# Patient Record
Sex: FEMALE | Race: WHITE | NOT HISPANIC OR LATINO | Employment: OTHER | URBAN - NONMETROPOLITAN AREA
[De-identification: names, ages, dates, MRNs, and addresses within clinical notes are randomized per-mention and may not be internally consistent; named-entity substitution may affect disease eponyms.]

---

## 2017-03-31 ENCOUNTER — APPOINTMENT (OUTPATIENT)
Dept: GENERAL RADIOLOGY | Facility: HOSPITAL | Age: 77
End: 2017-03-31

## 2017-03-31 ENCOUNTER — HOSPITAL ENCOUNTER (OUTPATIENT)
Facility: HOSPITAL | Age: 77
Setting detail: OBSERVATION
Discharge: HOME | End: 2017-04-02
Attending: EMERGENCY MEDICINE | Admitting: INTERNAL MEDICINE

## 2017-03-31 ENCOUNTER — APPOINTMENT (OUTPATIENT)
Dept: CT IMAGING | Facility: HOSPITAL | Age: 77
End: 2017-03-31

## 2017-03-31 DIAGNOSIS — N39.0 URINARY TRACT INFECTION WITHOUT HEMATURIA, SITE UNSPECIFIED: Primary | ICD-10-CM

## 2017-03-31 DIAGNOSIS — R41.0 CONFUSION: ICD-10-CM

## 2017-03-31 DIAGNOSIS — Z74.09 IMPAIRED FUNCTIONAL MOBILITY, BALANCE, GAIT, AND ENDURANCE: ICD-10-CM

## 2017-03-31 LAB
ALBUMIN SERPL-MCNC: 4.4 G/DL (ref 3.5–5)
ALBUMIN/GLOB SERPL: 1.2 G/DL (ref 1–2)
ALP SERPL-CCNC: 134 U/L (ref 38–126)
ALT SERPL W P-5'-P-CCNC: 26 U/L (ref 13–69)
ANION GAP SERPL CALCULATED.3IONS-SCNC: 17.8 MMOL/L
AST SERPL-CCNC: 30 U/L (ref 15–46)
BACTERIA UR QL AUTO: ABNORMAL /HPF
BASOPHILS # BLD AUTO: 0.02 10*3/MM3 (ref 0–0.2)
BASOPHILS NFR BLD AUTO: 0.4 % (ref 0–2.5)
BILIRUB SERPL-MCNC: 0.5 MG/DL (ref 0.2–1.3)
BILIRUB UR QL STRIP: ABNORMAL
BUN BLD-MCNC: 19 MG/DL (ref 7–20)
BUN/CREAT SERPL: 27.1 (ref 7.1–23.5)
CALCIUM SPEC-SCNC: 9.4 MG/DL (ref 8.4–10.2)
CHLORIDE SERPL-SCNC: 98 MMOL/L (ref 98–107)
CLARITY UR: ABNORMAL
CO2 SERPL-SCNC: 26 MMOL/L (ref 26–30)
COLOR UR: YELLOW
CREAT BLD-MCNC: 0.7 MG/DL (ref 0.6–1.3)
D-LACTATE SERPL-SCNC: 2 MMOL/L (ref 0.5–2)
DEPRECATED RDW RBC AUTO: 44.6 FL (ref 37–54)
EOSINOPHIL # BLD AUTO: 0 10*3/MM3 (ref 0–0.7)
EOSINOPHIL NFR BLD AUTO: 0 % (ref 0–7)
ERYTHROCYTE [DISTWIDTH] IN BLOOD BY AUTOMATED COUNT: 13.2 % (ref 11.5–14.5)
GFR SERPL CREATININE-BSD FRML MDRD: 81 ML/MIN/1.73
GLOBULIN UR ELPH-MCNC: 3.7 GM/DL
GLUCOSE BLD-MCNC: 110 MG/DL (ref 74–98)
GLUCOSE UR STRIP-MCNC: NEGATIVE MG/DL
HCT VFR BLD AUTO: 46.1 % (ref 37–47)
HGB BLD-MCNC: 15.3 G/DL (ref 12–16)
HGB UR QL STRIP.AUTO: ABNORMAL
HYALINE CASTS UR QL AUTO: ABNORMAL /LPF
IMM GRANULOCYTES # BLD: 0.02 10*3/MM3 (ref 0–0.06)
IMM GRANULOCYTES NFR BLD: 0.4 % (ref 0–0.6)
KETONES UR QL STRIP: ABNORMAL
LEUKOCYTE ESTERASE UR QL STRIP.AUTO: ABNORMAL
LYMPHOCYTES # BLD AUTO: 0.78 10*3/MM3 (ref 0.6–3.4)
LYMPHOCYTES NFR BLD AUTO: 13.8 % (ref 10–50)
MAGNESIUM SERPL-MCNC: 1.7 MG/DL (ref 1.6–2.3)
MCH RBC QN AUTO: 30.7 PG (ref 27–31)
MCHC RBC AUTO-ENTMCNC: 33.2 G/DL (ref 30–37)
MCV RBC AUTO: 92.4 FL (ref 81–99)
MONOCYTES # BLD AUTO: 0.68 10*3/MM3 (ref 0–0.9)
MONOCYTES NFR BLD AUTO: 12 % (ref 0–12)
NEUTROPHILS # BLD AUTO: 4.16 10*3/MM3 (ref 2–6.9)
NEUTROPHILS NFR BLD AUTO: 73.4 % (ref 37–80)
NITRITE UR QL STRIP: NEGATIVE
NRBC BLD MANUAL-RTO: 0 /100 WBC (ref 0–0)
PH UR STRIP.AUTO: 5.5 [PH] (ref 5–8)
PLATELET # BLD AUTO: 199 10*3/MM3 (ref 130–400)
PMV BLD AUTO: 10.5 FL (ref 6–12)
POTASSIUM BLD-SCNC: 3.8 MMOL/L (ref 3.5–5.1)
PROT SERPL-MCNC: 8.1 G/DL (ref 6.3–8.2)
PROT UR QL STRIP: ABNORMAL
RBC # BLD AUTO: 4.99 10*6/MM3 (ref 4.2–5.4)
RBC # UR: ABNORMAL /HPF
REF LAB TEST METHOD: ABNORMAL
SODIUM BLD-SCNC: 138 MMOL/L (ref 137–145)
SP GR UR STRIP: >=1.03 (ref 1–1.03)
SQUAMOUS #/AREA URNS HPF: ABNORMAL /HPF
TROPONIN I SERPL-MCNC: <0.012 NG/ML (ref 0–0.03)
UROBILINOGEN UR QL STRIP: ABNORMAL
WBC NRBC COR # BLD: 5.66 10*3/MM3 (ref 4.8–10.8)
WBC UR QL AUTO: ABNORMAL /HPF

## 2017-03-31 PROCEDURE — 70450 CT HEAD/BRAIN W/O DYE: CPT

## 2017-03-31 PROCEDURE — 81001 URINALYSIS AUTO W/SCOPE: CPT | Performed by: EMERGENCY MEDICINE

## 2017-03-31 PROCEDURE — 71020 HC CHEST PA AND LATERAL: CPT

## 2017-03-31 PROCEDURE — 84484 ASSAY OF TROPONIN QUANT: CPT | Performed by: EMERGENCY MEDICINE

## 2017-03-31 PROCEDURE — 99284 EMERGENCY DEPT VISIT MOD MDM: CPT

## 2017-03-31 PROCEDURE — 85025 COMPLETE CBC W/AUTO DIFF WBC: CPT | Performed by: EMERGENCY MEDICINE

## 2017-03-31 PROCEDURE — 83735 ASSAY OF MAGNESIUM: CPT | Performed by: EMERGENCY MEDICINE

## 2017-03-31 PROCEDURE — 80053 COMPREHEN METABOLIC PANEL: CPT | Performed by: EMERGENCY MEDICINE

## 2017-03-31 PROCEDURE — 87086 URINE CULTURE/COLONY COUNT: CPT | Performed by: EMERGENCY MEDICINE

## 2017-03-31 PROCEDURE — 96365 THER/PROPH/DIAG IV INF INIT: CPT

## 2017-03-31 PROCEDURE — 25010000002 CEFTRIAXONE: Performed by: EMERGENCY MEDICINE

## 2017-03-31 PROCEDURE — 83605 ASSAY OF LACTIC ACID: CPT | Performed by: EMERGENCY MEDICINE

## 2017-03-31 PROCEDURE — 93005 ELECTROCARDIOGRAM TRACING: CPT | Performed by: EMERGENCY MEDICINE

## 2017-03-31 RX ORDER — SODIUM CHLORIDE 0.9 % (FLUSH) 0.9 %
10 SYRINGE (ML) INJECTION AS NEEDED
Status: DISCONTINUED | OUTPATIENT
Start: 2017-03-31 | End: 2017-04-01

## 2017-03-31 RX ADMIN — CEFTRIAXONE 1 G: 1 INJECTION, POWDER, FOR SOLUTION INTRAMUSCULAR; INTRAVENOUS at 23:54

## 2017-04-01 PROBLEM — J44.1 ACUTE EXACERBATION OF CHRONIC OBSTRUCTIVE PULMONARY DISEASE (COPD) (HCC): Status: ACTIVE | Noted: 2017-04-01

## 2017-04-01 PROBLEM — N39.0 URINARY TRACT INFECTION WITHOUT HEMATURIA: Status: ACTIVE | Noted: 2017-04-01

## 2017-04-01 PROBLEM — G93.41 ACUTE METABOLIC ENCEPHALOPATHY: Status: ACTIVE | Noted: 2017-04-01

## 2017-04-01 PROBLEM — E44.0 MODERATE MALNUTRITION (HCC): Status: ACTIVE | Noted: 2017-04-01

## 2017-04-01 LAB
ANION GAP SERPL CALCULATED.3IONS-SCNC: 15.6 MMOL/L
BASOPHILS # BLD AUTO: 0.02 10*3/MM3 (ref 0–0.2)
BASOPHILS NFR BLD AUTO: 0.4 % (ref 0–2.5)
BUN BLD-MCNC: 19 MG/DL (ref 7–20)
BUN/CREAT SERPL: 31.7 (ref 7.1–23.5)
CALCIUM SPEC-SCNC: 8.7 MG/DL (ref 8.4–10.2)
CHLORIDE SERPL-SCNC: 102 MMOL/L (ref 98–107)
CO2 SERPL-SCNC: 24 MMOL/L (ref 26–30)
CREAT BLD-MCNC: 0.6 MG/DL (ref 0.6–1.3)
DEPRECATED RDW RBC AUTO: 45.1 FL (ref 37–54)
EOSINOPHIL # BLD AUTO: 0 10*3/MM3 (ref 0–0.7)
EOSINOPHIL NFR BLD AUTO: 0 % (ref 0–7)
ERYTHROCYTE [DISTWIDTH] IN BLOOD BY AUTOMATED COUNT: 13.2 % (ref 11.5–14.5)
FLUAV AG NPH QL: NEGATIVE
FLUBV AG NPH QL IA: NEGATIVE
GFR SERPL CREATININE-BSD FRML MDRD: 97 ML/MIN/1.73
GLUCOSE BLD-MCNC: 90 MG/DL (ref 74–98)
HCT VFR BLD AUTO: 41.8 % (ref 37–47)
HGB BLD-MCNC: 13.8 G/DL (ref 12–16)
HOLD SPECIMEN: NORMAL
HOLD SPECIMEN: NORMAL
IMM GRANULOCYTES # BLD: 0.01 10*3/MM3 (ref 0–0.06)
IMM GRANULOCYTES NFR BLD: 0.2 % (ref 0–0.6)
LYMPHOCYTES # BLD AUTO: 0.67 10*3/MM3 (ref 0.6–3.4)
LYMPHOCYTES NFR BLD AUTO: 13.7 % (ref 10–50)
MCH RBC QN AUTO: 30.5 PG (ref 27–31)
MCHC RBC AUTO-ENTMCNC: 33 G/DL (ref 30–37)
MCV RBC AUTO: 92.5 FL (ref 81–99)
MONOCYTES # BLD AUTO: 0.52 10*3/MM3 (ref 0–0.9)
MONOCYTES NFR BLD AUTO: 10.6 % (ref 0–12)
NEUTROPHILS # BLD AUTO: 3.68 10*3/MM3 (ref 2–6.9)
NEUTROPHILS NFR BLD AUTO: 75.1 % (ref 37–80)
NRBC BLD MANUAL-RTO: 0 /100 WBC (ref 0–0)
PLATELET # BLD AUTO: 190 10*3/MM3 (ref 130–400)
PMV BLD AUTO: 10.8 FL (ref 6–12)
POTASSIUM BLD-SCNC: 3.6 MMOL/L (ref 3.5–5.1)
RBC # BLD AUTO: 4.52 10*6/MM3 (ref 4.2–5.4)
SODIUM BLD-SCNC: 138 MMOL/L (ref 137–145)
WBC NRBC COR # BLD: 4.9 10*3/MM3 (ref 4.8–10.8)
WHOLE BLOOD HOLD SPECIMEN: NORMAL
WHOLE BLOOD HOLD SPECIMEN: NORMAL

## 2017-04-01 PROCEDURE — 85025 COMPLETE CBC W/AUTO DIFF WBC: CPT | Performed by: INTERNAL MEDICINE

## 2017-04-01 PROCEDURE — 99220 PR INITIAL OBSERVATION CARE/DAY 70 MINUTES: CPT | Performed by: INTERNAL MEDICINE

## 2017-04-01 PROCEDURE — G0378 HOSPITAL OBSERVATION PER HR: HCPCS

## 2017-04-01 PROCEDURE — 25010000002 AZITHROMYCIN: Performed by: INTERNAL MEDICINE

## 2017-04-01 PROCEDURE — 94799 UNLISTED PULMONARY SVC/PX: CPT

## 2017-04-01 PROCEDURE — 96372 THER/PROPH/DIAG INJ SC/IM: CPT

## 2017-04-01 PROCEDURE — 25010000002 ENOXAPARIN PER 10 MG: Performed by: INTERNAL MEDICINE

## 2017-04-01 PROCEDURE — 96367 TX/PROPH/DG ADDL SEQ IV INF: CPT

## 2017-04-01 PROCEDURE — 80048 BASIC METABOLIC PNL TOTAL CA: CPT | Performed by: INTERNAL MEDICINE

## 2017-04-01 PROCEDURE — 87804 INFLUENZA ASSAY W/OPTIC: CPT | Performed by: EMERGENCY MEDICINE

## 2017-04-01 PROCEDURE — 94640 AIRWAY INHALATION TREATMENT: CPT

## 2017-04-01 RX ORDER — TRAMADOL HYDROCHLORIDE 50 MG/1
50 TABLET ORAL EVERY 6 HOURS PRN
COMMUNITY
End: 2019-01-09

## 2017-04-01 RX ORDER — ACETAMINOPHEN 325 MG/1
650 TABLET ORAL EVERY 4 HOURS PRN
Status: DISCONTINUED | OUTPATIENT
Start: 2017-04-01 | End: 2017-04-02 | Stop reason: HOSPADM

## 2017-04-01 RX ORDER — IPRATROPIUM BROMIDE AND ALBUTEROL SULFATE 2.5; .5 MG/3ML; MG/3ML
3 SOLUTION RESPIRATORY (INHALATION)
Status: DISCONTINUED | OUTPATIENT
Start: 2017-04-01 | End: 2017-04-02 | Stop reason: HOSPADM

## 2017-04-01 RX ORDER — GABAPENTIN 300 MG/1
300 CAPSULE ORAL
COMMUNITY
End: 2018-12-20 | Stop reason: HOSPADM

## 2017-04-01 RX ORDER — PROPRANOLOL HYDROCHLORIDE 40 MG/1
20 TABLET ORAL 3 TIMES DAILY
Status: ON HOLD | COMMUNITY
End: 2019-02-08

## 2017-04-01 RX ORDER — ONDANSETRON 4 MG/1
4 TABLET, FILM COATED ORAL EVERY 6 HOURS PRN
Status: DISCONTINUED | OUTPATIENT
Start: 2017-04-01 | End: 2017-04-02 | Stop reason: HOSPADM

## 2017-04-01 RX ORDER — IPRATROPIUM BROMIDE AND ALBUTEROL SULFATE 2.5; .5 MG/3ML; MG/3ML
3 SOLUTION RESPIRATORY (INHALATION) EVERY 4 HOURS PRN
Status: DISCONTINUED | OUTPATIENT
Start: 2017-04-01 | End: 2017-04-02 | Stop reason: HOSPADM

## 2017-04-01 RX ORDER — GABAPENTIN 300 MG/1
300 CAPSULE ORAL EVERY 12 HOURS SCHEDULED
Status: DISCONTINUED | OUTPATIENT
Start: 2017-04-01 | End: 2017-04-02 | Stop reason: HOSPADM

## 2017-04-01 RX ORDER — MULTIPLE VITAMINS W/ MINERALS TAB 9MG-400MCG
1 TAB ORAL DAILY
Status: DISCONTINUED | OUTPATIENT
Start: 2017-04-02 | End: 2017-04-02 | Stop reason: HOSPADM

## 2017-04-01 RX ORDER — PAROXETINE HYDROCHLORIDE 20 MG/1
10 TABLET, FILM COATED ORAL EVERY MORNING
Status: DISCONTINUED | OUTPATIENT
Start: 2017-04-02 | End: 2017-04-02 | Stop reason: HOSPADM

## 2017-04-01 RX ORDER — PAROXETINE HYDROCHLORIDE 20 MG/1
20 TABLET, FILM COATED ORAL EVERY MORNING
COMMUNITY

## 2017-04-01 RX ORDER — TRAMADOL HYDROCHLORIDE 50 MG/1
50 TABLET ORAL EVERY 6 HOURS PRN
Status: DISCONTINUED | OUTPATIENT
Start: 2017-04-01 | End: 2017-04-02 | Stop reason: HOSPADM

## 2017-04-01 RX ORDER — ONDANSETRON 2 MG/ML
4 INJECTION INTRAMUSCULAR; INTRAVENOUS EVERY 6 HOURS PRN
Status: DISCONTINUED | OUTPATIENT
Start: 2017-04-01 | End: 2017-04-02 | Stop reason: HOSPADM

## 2017-04-01 RX ORDER — BUDESONIDE 0.5 MG/2ML
0.5 INHALANT ORAL
Status: DISCONTINUED | OUTPATIENT
Start: 2017-04-01 | End: 2017-04-02 | Stop reason: HOSPADM

## 2017-04-01 RX ORDER — SODIUM CHLORIDE 0.9 % (FLUSH) 0.9 %
1-10 SYRINGE (ML) INJECTION AS NEEDED
Status: DISCONTINUED | OUTPATIENT
Start: 2017-04-01 | End: 2017-04-02 | Stop reason: HOSPADM

## 2017-04-01 RX ORDER — PROPRANOLOL HYDROCHLORIDE 10 MG/1
40 TABLET ORAL 3 TIMES DAILY
Status: DISCONTINUED | OUTPATIENT
Start: 2017-04-01 | End: 2017-04-02 | Stop reason: HOSPADM

## 2017-04-01 RX ORDER — GUAIFENESIN/DEXTROMETHORPHAN 100-10MG/5
5 SYRUP ORAL EVERY 4 HOURS PRN
Status: DISCONTINUED | OUTPATIENT
Start: 2017-04-01 | End: 2017-04-02 | Stop reason: HOSPADM

## 2017-04-01 RX ORDER — AZITHROMYCIN 250 MG/1
250 TABLET, FILM COATED ORAL
Status: DISCONTINUED | OUTPATIENT
Start: 2017-04-02 | End: 2017-04-02 | Stop reason: HOSPADM

## 2017-04-01 RX ORDER — ONDANSETRON 4 MG/1
4 TABLET, ORALLY DISINTEGRATING ORAL EVERY 6 HOURS PRN
Status: DISCONTINUED | OUTPATIENT
Start: 2017-04-01 | End: 2017-04-02 | Stop reason: HOSPADM

## 2017-04-01 RX ORDER — NICOTINE 21 MG/24HR
1 PATCH, TRANSDERMAL 24 HOURS TRANSDERMAL EVERY 24 HOURS
Status: DISCONTINUED | OUTPATIENT
Start: 2017-04-01 | End: 2017-04-02 | Stop reason: HOSPADM

## 2017-04-01 RX ORDER — SODIUM CHLORIDE 9 MG/ML
100 INJECTION, SOLUTION INTRAVENOUS CONTINUOUS
Status: DISCONTINUED | OUTPATIENT
Start: 2017-04-01 | End: 2017-04-02 | Stop reason: HOSPADM

## 2017-04-01 RX ADMIN — TRAMADOL HYDROCHLORIDE 50 MG: 50 TABLET, FILM COATED ORAL at 18:08

## 2017-04-01 RX ADMIN — BUDESONIDE 0.5 MG: 0.5 INHALANT RESPIRATORY (INHALATION) at 19:21

## 2017-04-01 RX ADMIN — IPRATROPIUM BROMIDE AND ALBUTEROL SULFATE 3 ML: .5; 3 SOLUTION RESPIRATORY (INHALATION) at 13:48

## 2017-04-01 RX ADMIN — ENOXAPARIN SODIUM 40 MG: 40 INJECTION SUBCUTANEOUS at 09:07

## 2017-04-01 RX ADMIN — SODIUM CHLORIDE 100 ML/HR: 9 INJECTION, SOLUTION INTRAVENOUS at 02:22

## 2017-04-01 RX ADMIN — AZITHROMYCIN 500 MG: 500 INJECTION, POWDER, LYOPHILIZED, FOR SOLUTION INTRAVENOUS at 02:22

## 2017-04-01 RX ADMIN — NICOTINE 1 PATCH: 14 PATCH TRANSDERMAL at 02:22

## 2017-04-01 RX ADMIN — IPRATROPIUM BROMIDE AND ALBUTEROL SULFATE 3 ML: .5; 3 SOLUTION RESPIRATORY (INHALATION) at 06:51

## 2017-04-01 RX ADMIN — BUDESONIDE 0.5 MG: 0.5 INHALANT RESPIRATORY (INHALATION) at 06:53

## 2017-04-01 RX ADMIN — GABAPENTIN 300 MG: 300 CAPSULE ORAL at 20:04

## 2017-04-01 RX ADMIN — PROPRANOLOL HYDROCHLORIDE 40 MG: 10 TABLET ORAL at 20:04

## 2017-04-01 RX ADMIN — IPRATROPIUM BROMIDE AND ALBUTEROL SULFATE 3 ML: .5; 3 SOLUTION RESPIRATORY (INHALATION) at 19:21

## 2017-04-01 RX ADMIN — SODIUM CHLORIDE 100 ML/HR: 9 INJECTION, SOLUTION INTRAVENOUS at 20:05

## 2017-04-01 NOTE — PROGRESS NOTES
"Hospitalist Progress Note.    LOS: 0 days    Patient Care Team:  Gregorio Jackson MD as PCP - General    Chief Complaint:    Chief Complaint   Patient presents with   • Cough   • Altered Mental Status       Subjective   Patient seen and examined this morning.  Events from last night noted.  Patient denies having any fevers chills.  No nausea or vomiting no abdominal pain.  Denies any chest pain shortness of breath cough or sputum production.  There is no significant edema.   Patient also denies having new onset weakness of numbness of either extremity.      Review of Systems:    The pertinent  ROS was done and it is noted above, rest  was negative.    Objective     Vital Signs  /94 (BP Location: Right arm, Patient Position: Lying)  Pulse 90  Temp 99.4 °F (37.4 °C) (Oral)   Resp 18  Ht 64\" (162.6 cm)  Wt 101 lb 6.6 oz (46 kg)  SpO2 93%  BMI 17.41 kg/m2      I/O this shift:  In: 240 [P.O.:240]  Out: -     Intake/Output Summary (Last 24 hours) at 04/01/17 1154  Last data filed at 04/01/17 0846   Gross per 24 hour   Intake              794 ml   Output               50 ml   Net              744 ml       Physical Exam:    General Appearance: alert, oriented x 3, no acute distress,   HEENT: pupils round and reactive to light, oral mucosa dry, extra occular movements intact.  Neck: supple, no JVD, trachea midline  Lungs: Clear to Auscultation, unlabored breathing effort  Heart: RRR, normal S1 and S2, no S3, no rub  Abdomen: soft, non-tender, no palpable bladder, present bowel sounds to auscultation  Extremities: no edema, cyanosis or clubbing.   Neuro: normal speech and mental status, grossly non focal.     Results Review:      Results from last 7 days  Lab Units 04/01/17  0515 03/31/17  2217   SODIUM mmol/L 138 138   POTASSIUM mmol/L 3.6 3.8   CHLORIDE mmol/L 102 98   TOTAL CO2 mmol/L 24.0* 26.0   BUN mg/dL 19 19   CREATININE mg/dL 0.60 0.70   CALCIUM mg/dL 8.7 9.4   BILIRUBIN mg/dL  --  0.5   ALK PHOS U/L "  --  134*   ALT (SGPT) U/L  --  26   AST (SGOT) U/L  --  30   GLUCOSE mg/dL 90 110*       Estimated Creatinine Clearance: 42.8 mL/min (by C-G formula based on Cr of 0.6).      Results from last 7 days  Lab Units 03/31/17  2217   MAGNESIUM mg/dL 1.7               Results from last 7 days  Lab Units 04/01/17  0515 03/31/17  2217   WBC 10*3/mm3 4.90 5.66   HEMOGLOBIN g/dL 13.8 15.3   PLATELETS 10*3/mm3 190 199               Imaging Results (last 24 hours)     Procedure Component Value Units Date/Time    XR Chest 2 View [[id number]] Collected:  03/31/17 2323     Updated:  03/31/17 2325    Narrative:       FINAL REPORT    TECHNIQUE:  AP and lateral views of the chest were obtained.    CLINICAL HISTORY:  SOA.BACK PAIN.    FINDINGS:  There is elevation of the left hemidiaphragm. The lungs are clear. There is no pleural disease. A skin fold projecting over the left lower lung field should not be confused with a pneumothorax. There are old right lower posterior rib fractures. There is   no acute osseous abnormality.      Impression:       No acute disease.    Authenticated by Wilbert Boles M.D. on 03/31/2017 11:23:30 PM    CT Head Without Contrast [[id number]] Collected:  03/31/17 2337     Updated:  03/31/17 2339    Narrative:       FINAL REPORT    TECHNIQUE:  Routine axial images through the head were obtained without contrast.    CLINICAL HISTORY:  AMS    FINDINGS:  The ventricles are dilated, proportionate to the degree of generalized atrophy.  Periventricular and deep white matter low-attenuation areas are consistent with chronic small vessel ischemia.  There is no mass or shift in midline structures.  There is no   intracranial hemorrhage.  There is no significant sinus or osseous abnormality.      Impression:       No acute intracranial abnormality.    Authenticated by Wilbert Boles M.D. on 03/31/2017 11:37:23 PM          [START ON 4/2/2017] azithromycin 250 mg Oral Q24H   budesonide 0.5 mg Nebulization BID - RT   enoxaparin 40 mg  Subcutaneous Daily   ipratropium-albuterol 3 mL Nebulization Q6H - RT   nicotine 1 patch Transdermal Q24H       sodium chloride 100 mL/hr Last Rate: 100 mL/hr (04/01/17 0222)       Medication Review:   Current Facility-Administered Medications   Medication Dose Route Frequency Provider Last Rate Last Dose   • acetaminophen (TYLENOL) tablet 650 mg  650 mg Oral Q4H PRN Sandoval Falcon MD       • [START ON 4/2/2017] azithromycin (ZITHROMAX) tablet 250 mg  250 mg Oral Q24H Sandoval Falcon MD       • budesonide (PULMICORT) nebulizer solution 0.5 mg  0.5 mg Nebulization BID - RT Sandoval Falcon MD   0.5 mg at 04/01/17 0653   • enoxaparin (LOVENOX) syringe 40 mg  40 mg Subcutaneous Daily Sandoval Falcon MD   40 mg at 04/01/17 0907   • guaifenesin-dextromethorphan (ROBITUSSIN DM) 100-10 MG/5ML syrup 5 mL  5 mL Oral Q4H PRN Sandoval Falcon MD       • ipratropium-albuterol (DUO-NEB) nebulizer solution 3 mL  3 mL Nebulization Q4H PRN Sandoval Falcon MD       • ipratropium-albuterol (DUO-NEB) nebulizer solution 3 mL  3 mL Nebulization Q6H - RT Sandoval Falcon MD   3 mL at 04/01/17 0651   • nicotine (NICODERM CQ) 14 MG/24HR patch 1 patch  1 patch Transdermal Q24H Sandoval Falcon MD   1 patch at 04/01/17 0222   • ondansetron (ZOFRAN) tablet 4 mg  4 mg Oral Q6H PRN Sandoval Falcon MD        Or   • ondansetron ODT (ZOFRAN-ODT) disintegrating tablet 4 mg  4 mg Oral Q6H PRN Sandoval Falcon MD        Or   • ondansetron (ZOFRAN) injection 4 mg  4 mg Intravenous Q6H PRN Sandoval Falcon MD       • sodium chloride 0.9 % flush 1-10 mL  1-10 mL Intravenous PRN Sandoval Falcon MD       • sodium chloride 0.9 % infusion  100 mL/hr Intravenous Continuous Sandoval Falcon  mL/hr at 04/01/17 0222 100 mL/hr at 04/01/17 0222       Assessment/Plan       Principal Problem:    Urinary tract infection without hematuria  Active Problems:    Acute exacerbation of chronic obstructive pulmonary disease (COPD)    Moderate malnutrition    Acute metabolic encephalopathy    After I finish the  examination of the patient she told me that Dr. Jackson was and this morning.  I was not aware that he was working this weekend.  I did tell the family as well as the patient that he will take over the care from here on.  Patient was admitted earlier this morning and appears to be improving with the current treatment plan.    Details were discussed with the patient as well as family in the room.   I also discussed the details with the nursing staff.    Rest as ordered.    Cameron Currie MD  04/01/17  11:54 AM    Please note that portions of this note may have been completed with a voice recognition program. Efforts were made to edit the dictations, but occasionally words are mistranscribed.

## 2017-04-01 NOTE — PLAN OF CARE
Problem: Patient Care Overview (Adult)  Goal: Plan of Care Review  Outcome: Ongoing (interventions implemented as appropriate)    04/01/17 0256   Coping/Psychosocial Response Interventions   Plan Of Care Reviewed With patient   Patient Care Overview   Progress progress towards functional goals is fair   Outcome Evaluation   Outcome Summary/Follow up Plan NEW ADMISSION.       Goal: Adult Individualization and Mutuality  Outcome: Ongoing (interventions implemented as appropriate)  Goal: Discharge Needs Assessment  Outcome: Ongoing (interventions implemented as appropriate)    Problem: Fall Risk (Adult)  Goal: Identify Related Risk Factors and Signs and Symptoms  Outcome: Ongoing (interventions implemented as appropriate)  Goal: Absence of Falls  Outcome: Ongoing (interventions implemented as appropriate)    Problem: Urine Elimination, Impaired (Adult)  Goal: Identify Related Risk Factors and Signs and Symptoms  Outcome: Ongoing (interventions implemented as appropriate)  Goal: Effective Urinary Elimination  Outcome: Ongoing (interventions implemented as appropriate)  Goal: Effective Containment of Urine  Outcome: Ongoing (interventions implemented as appropriate)  Goal: Reduced Incontinence Episodes  Outcome: Ongoing (interventions implemented as appropriate)

## 2017-04-01 NOTE — CONSULTS
Malnutrition Severity Assessment    Patient Name:  Maria Dolores Campa  YOB: 1940  MRN: [id number]  Admit Date:  3/31/2017    Patient meets criteria for : Moderate malnutrition    Comments:  Rec. #1: Consider adding High Calorie, High Protein to current diet order. Rec. #2: Consider speech evaluation secondary to pt. With MS diagnosis. Rec. #3: Consider adding appetite stimulant. Rec. #4: Consider adding MVI with minerals daily. Rec. #5: Consider checking pt.'s Vitamin D3 and Vitamin B12 levels and supplementing as needed along with adding Omega 3 Fatty acids secondary to MS diagnosis. RD added Cranberry Juice to the breakfast tray. Pt to also receive Ensure BID and Magic Cup BID. RD to follow pt. Thank you for the consult.       Malnutrition Type: Chronic Illness Malnutrition     Malnutrition Type (last 8 hours)      Malnutrition Severity Assessment       04/01/17 1008    Physical Signs of Malnutrition (Chronic)    Muscle Wasting Mild    Fat Loss Mild    Fluid Accumulation None    Secondary Physical Signs None      04/01/17 1008    Malnutrition Severity Assessment    Malnutrition Type Chronic Illness Malnutrition          Physical Signs of Malnutrition         Most Recent Value    Muscle Wasting  Mild    Fat Loss  Mild    Fluid Accumulation  None    Secondary Physical Signs  None      Weight Status         Most Recent Value    BMI  Mild (<19)    %IBW  Mild (<90%)              Electronically signed by:  Keesha Vu RD  04/01/17 10:14 AM

## 2017-04-01 NOTE — CONSULTS
Malnutrition Severity Assessment    Patient Name:  Maria Dolores Campa  YOB: 1940  MRN: [id number]  Admit Date:  3/31/2017    Patient meets criteria for : Moderate malnutrition    Comments:   Rec. #1: Consider adding High Calorie, High Protein to current diet order. Rec. #2: Consider speech evaluation secondary to pt. With MS diagnosis. Rec. #3: Consider adding appetite stimulant. Rec. #4: Consider adding MVI with minerals daily. Rec. #5: Consider checking pt.'s Vitamin D3 and Vitamin B12 levels and supplementing as needed along with adding Omega 3 Fatty acids secondary to MS diagnosis. RD added Cranberry Juice to the breakfast tray. Pt to also receive Ensure BID and Magic Cup BID. RD to follow pt. Thank you for the consult.           Malnutrition Type: Chronic Illness Malnutrition     Malnutrition Type (last 8 hours)      Malnutrition Severity Assessment       04/01/17 1008    Physical Signs of Malnutrition (Chronic)    Muscle Wasting Mild    Fat Loss Mild    Fluid Accumulation None    Secondary Physical Signs None      04/01/17 1008    Malnutrition Severity Assessment    Malnutrition Type Chronic Illness Malnutrition          Physical Signs of Malnutrition         Most Recent Value    Muscle Wasting  Mild    Fat Loss  Mild    Fluid Accumulation  None    Secondary Physical Signs  None      Weight Status         Most Recent Value    BMI  Mild (<19)    %IBW  Mild (<90%)              Electronically signed by:  Keesha Vu RD  04/01/17 10:17 AM

## 2017-04-01 NOTE — ED PROVIDER NOTES
"Subjective   HPI Comments: 77-year-old female presenting with altered mental status and cough.  She is brought in by her family who provide history.  The last 2 days patient has had a \"severe\" cough.  Cough has been largely nonproductive.  Today she has had slowly progressive confusion, has not been able to converse with her  as usual.  They do note she's had several episodes of UTIs that have presented similarly.  They deny any severe headaches, chest pain, shortness of breath, vomiting, diarrhea.  No sick contacts that they know of.      Review of Systems   Unable to perform ROS: Mental status change       Past Medical History:   Diagnosis Date   • COPD (chronic obstructive pulmonary disease)    • MS (multiple sclerosis)        No Known Allergies    Past Surgical History:   Procedure Laterality Date   • CHOLECYSTECTOMY     • EYE SURGERY      Corneal implant       History reviewed. No pertinent family history.    Social History     Social History   • Marital status:      Spouse name: N/A   • Number of children: N/A   • Years of education: N/A     Social History Main Topics   • Smoking status: Current Every Day Smoker     Packs/day: 0.50     Types: Cigarettes   • Smokeless tobacco: None   • Alcohol use No   • Drug use: No   • Sexual activity: Defer     Other Topics Concern   • None     Social History Narrative   • None           Objective   Physical Exam   Constitutional: She appears well-developed and well-nourished. No distress.   HENT:   Head: Normocephalic and atraumatic.   Right Ear: External ear normal.   Left Ear: External ear normal.   Nose: Nose normal.   Mouth/Throat: Oropharynx is clear and moist.   Eyes: Conjunctivae and EOM are normal. Pupils are equal, round, and reactive to light.   Neck: Normal range of motion. Neck supple.   No meningismus   Cardiovascular: Normal rate, regular rhythm, normal heart sounds and intact distal pulses.    Pulmonary/Chest: Effort normal and breath sounds " normal. No respiratory distress.   Crackles at the right base   Abdominal: Soft. Bowel sounds are normal. She exhibits no distension. There is no tenderness. There is no rebound and no guarding.   Musculoskeletal: Normal range of motion. She exhibits no edema, tenderness or deformity.   Neurological: She is alert.   Moves all extremities   Skin: Skin is warm and dry. No rash noted.   Psychiatric: She has a normal mood and affect. Her behavior is normal.   Nursing note and vitals reviewed.      Procedures         ED Course  ED Course                  MDM  Number of Diagnoses or Management Options  Confusion:   Urinary tract infection without hematuria, site unspecified:   Diagnosis management comments: 77-year-old female with altered mental status and cough.  Frail elderly female in no distress with normal vital signs and exam as above.  She does have some crackles at the right base.  Otherwise an essentially nonfocal exam.  We'll check labs, EKG, chest x-ray, head CT.  Disposition pending workup though anticipate admission.    EKG: Sinus rhythm, short AR, normal axis, T-wave inversion in V4 and V5, otherwise diffuse T wave flattening, similar to previous    Lab work is unremarkable.  CT scan is negative.  Chest x-ray read as clear by radiology.  UA with white blood cells, leuk esterase, bacteria.  I have given her a dose of Rocephin.  Discussed with hospitalist for admission.      Final diagnoses:   Urinary tract infection without hematuria, site unspecified   Confusion            Solis Jack MD  04/01/17 0018

## 2017-04-01 NOTE — H&P
[facility]   HISTORY AND PHYSICAL      Name:  Maria Dolores Campa   Age:  77 y.o.  Sex:  female  :  1940  MRN:  [id number]   Visit Number:  [id number]  Admission Date:  3/31/2017  Date Of Service:  17  Primary Care Physician:  Gregorio Jackson MD    Chief Complaint:     Increasing shortness of breath and confusion.    History Of Presenting Illness:      This is a 77-year-old female with history of COPD not using home oxygen, who continues to smoke, was brought to the emergency room by her  and daughter with complaints of worsening shortness of breath, confusion and generalized weakness in the last several days.  Patient was seen and examined in the emergency room at 00:30 today.  The history is obtained from the  and daughter were at the bedside.  The patient started having worsening of her cough with white sputum about 1 week ago.  This has progressively worsened associated with some confusion and generalized weakness.  There is no history of fever, nausea or vomiting.  No history of abdominal pain.  Patient was brought into the emergency room by the family.    Patient was evaluated in the emergency room.  Her temperature was 99.3.  Pulse 82, blood pressure 114/96 and her pulse oxygen saturation was 92% on room air.  Blood work done in the emergency room was fairly stable.  Urinalysis showed urinary tract infection.  Chest x-ray was negative for any infiltrates.  She was placed on oxygen and was given Rocephin for urinary tract infection and is currently being admitted to the medical floor.  She complains of shortness of breath and back pain at this time.  Her last admission to this facility was in 2016 for COPD exacerbation.    Patient use with her  and uses a walker to ambulate.  She smokes half pack of cigarettes per day.    Review Of Systems:     General ROS: Patient denies any fevers, chills or loss of consciousness. Complains of  generalized weakness.  Psychological ROS: No history of any hallucinations and delusions.  Ophthalmic ROS: No history of any diplopia or transient loss of vision.  ENT ROS: No history of sore throat, nasal congestion or ear pain.   Allergy and Immunology ROS: No history of rash or itching.  Hematological and Lymphatic ROS: No history of neck swelling or easy bleeding.  Endocrine ROS: No history of any recent unintentional weight gain or loss.  Respiratory ROS: Complaints or shortness of breath and cough.  No pleuritic chest pain.  Cardiovascular ROS: No history of chest pain or palpitations.  Gastrointestinal ROS: No history of nausea and vomiting. Denies any abdominal pain. No diarrhea.  Genito-Urinary ROS: No history of dysuria or hematuria.  Musculoskeletal ROS: No muscle pain. No calf pain. Complains of chronic back pain.  Neurological ROS: No history of any focal weakness. No loss of consciousness. Denies any numbness. Denies neck pain.  There is history of confusion.  Dermatological ROS: No history of any redness or pruritis.     Past Medical History:    Past Medical History:   Diagnosis Date   • COPD (chronic obstructive pulmonary disease)    • MS (multiple sclerosis)        Past Surgical history:    Past Surgical History:   Procedure Laterality Date   • CHOLECYSTECTOMY     • EYE SURGERY      Corneal implant       Social History:    Social History     Social History   • Marital status:      Spouse name: N/A   • Number of children: N/A   • Years of education: N/A     Occupational History   • Not on file.     Social History Main Topics   • Smoking status: Current Every Day Smoker     Packs/day: 0.50     Types: Cigarettes   • Smokeless tobacco: Not on file   • Alcohol use No   • Drug use: No   • Sexual activity: Defer     Other Topics Concern   • Not on file     Social History Narrative   • No narrative on file       Family History:    History reviewed. No pertinent family history.    Allergies:       Review of patient's allergies indicates no known allergies.    Home Medications:    Prior to Admission Medications     None             ED Medications:    Medications   sodium chloride 0.9 % flush 10 mL (not administered)   cefTRIAXone (ROCEPHIN) 1 g/50 mL 0.9% NS VTB (mbp) (0 g Intravenous Stopped 4/1/17 0030)       Vital Signs:    Temp:  [99.3 °F (37.4 °C)] 99.3 °F (37.4 °C)  Heart Rate:  [82-95] 95  Resp:  [20] 20  BP: (110-114)/(96-98) 110/98    Last 3 weights    03/31/17  2211   Weight: 105 lb (47.6 kg)       Body mass index is 18.02 kg/(m^2).    Physical Exam:    General Appearance:  Alert and cooperative, in mild distress.   Head:  Atraumatic and normocephalic, without obvious abnormality.   Eyes:          PERRLA, conjunctivae and sclerae normal, no Icterus. No pallor. Extra-occular movements are within normal limits.   Ears:  Ears appear intact with no abnormalities noted.   Throat: No oral lesions, no thrush, oral mucosa moist.   Neck: Supple, trachea midline, no thyromegaly, no carotid bruit.   Back:   No kyphoscoliosis present. No tenderness to palpation,   range of motion normal.   Lungs:   Chest shape is normal. Breath sounds heard bilaterally equally but air entry is decreased.  No crackles or wheezing. No Pleural rub or bronchial breathing.   Heart:  Normal S1 and S2, no murmur, no gallop, no rub. No JVD.   Abdomen:   Normal bowel sounds, no masses, no organomegaly. Soft     non-tender, non-distended, no guarding, no rebound tenderness.   Extremities: Moves all extremities well, no edema, no cyanosis, no clubbing.   Pulses: Pulses palpable and equal bilaterally.   Skin: No bleeding, bruising or rash.   Neurologic: Alert and oriented x 3. Moves all four limbs equally. No tremors. No facial asymetry.     Laboratory data:    I have reviewed the labs done in the emergency room.      Results from last 7 days  Lab Units 03/31/17  2217   SODIUM mmol/L 138   POTASSIUM mmol/L 3.8   CHLORIDE mmol/L 98    TOTAL CO2 mmol/L 26.0   BUN mg/dL 19   CREATININE mg/dL 0.70   CALCIUM mg/dL 9.4   BILIRUBIN mg/dL 0.5   ALK PHOS U/L 134*   ALT (SGPT) U/L 26   AST (SGOT) U/L 30   GLUCOSE mg/dL 110*       Results from last 7 days  Lab Units 03/31/17  2217   WBC 10*3/mm3 5.66   HEMOGLOBIN g/dL 15.3   HEMATOCRIT % 46.1   PLATELETS 10*3/mm3 199           Results from last 7 days  Lab Units 03/31/17  2217   TROPONIN I ng/mL <0.012                       Results from last 7 days  Lab Units 03/31/17  2308   COLOR UA  Yellow   GLUCOSE UA  Negative   KETONES UA  80 mg/dL (3+)*   LEUKOCYTES UA  Trace*   PH, URINE  5.5   BILIRUBIN UA  Small (1+)*   UROBILINOGEN UA  0.2 E.U./dL     Pain Management Panel     There is no flowsheet data to display.              EKG:      EKG done in the emergency room was reviewed by me.  It shows normal sinus rhythm at 88 bpm.  Normal axis. P pulmonale was noted.  Inverted T waves noted in V4 to V6.    Radiology:    Imaging Results (last 72 hours)     Procedure Component Value Units Date/Time    XR Chest 2 View [[id number]] Collected:  03/31/17 2323     Updated:  03/31/17 2325    Narrative:       FINAL REPORT    TECHNIQUE:  AP and lateral views of the chest were obtained.    CLINICAL HISTORY:  SOA.BACK PAIN.    FINDINGS:  There is elevation of the left hemidiaphragm. The lungs are clear. There is no pleural disease. A skin fold projecting over the left lower lung field should not be confused with a pneumothorax. There are old right lower posterior rib fractures. There is   no acute osseous abnormality.      Impression:       No acute disease.    Authenticated by Wilbert Boles M.D. on 03/31/2017 11:23:30 PM    CT Head Without Contrast [[id number]] Collected:  03/31/17 2337     Updated:  03/31/17 2339    Narrative:       FINAL REPORT    TECHNIQUE:  Routine axial images through the head were obtained without contrast.    CLINICAL HISTORY:  AMS    FINDINGS:  The ventricles are dilated, proportionate to the degree of  generalized atrophy.  Periventricular and deep white matter low-attenuation areas are consistent with chronic small vessel ischemia.  There is no mass or shift in midline structures.  There is no   intracranial hemorrhage.  There is no significant sinus or osseous abnormality.      Impression:       No acute intracranial abnormality.    Authenticated by Wilbert Boles M.D. on 03/31/2017 11:37:23 PM        I reviewed the CT of the head and chest x-ray images and I agree with the above reports.  She does have emphysematous lung fields.  Elevated left diaphragm was seen in her prior x-rays.    Assessment:    1.  Acute metabolic encephalopathy secondary to #2, present on admission.  2.  Acute urinary tract infection, present on admission.  3.  Acute COPD exacerbation, present on admission.  4.  History of multiple sclerosis.  5.  Generalized weakness.  6.  Moderate malnutrition with a BMI of 18.    Plan:     Patient is currently being admitted to the medical floor.  She will be treated with oxygen, bronchodilators, budesonide and mucolytic agents.  We will continue her on antibiotic therapy with Rocephin and azithromycin for both her bronchitis as well as urinary tract infection.  Physical and occupational therapy consults will be obtained.  I have strongly advised her to discontinue smoking and I will place her on nicotine patch.  A nutrition consult will be obtained. I have discussed advanced directives and her CODE STATUS is full code.  I have discussed the patient's condition with her  and her daughter were at the bedside.  Further recommendations depend upon her clinical course.    Sandoval Falcon MD  04/01/17  12:38 AM    Please note that portions of this note may have been completed with a voice recognition program. Efforts were made to edit the dictations, but occasionally words are mistranscribed.

## 2017-04-01 NOTE — PROGRESS NOTES
"Adult Nutrition  Assessment/PES    Patient Name:  Maria Dolores Campa  YOB: 1940  MRN: [id number]  Admit Date:  3/31/2017    Assessment Date:  4/1/2017        Reason for Assessment       04/01/17 1002    Reason for Assessment    Reason For Assessment/Visit physician consult;admission assessment    Identified At Risk By Screening Criteria weight status;BMI;diagnosis    Diagnosis Diagnosis    Neurological Metabolic encephalopathy;MS    Pulmonary/Critical Care COPD    Renal Other (comment)   UTI    Factors Affecting Nutrition Factors    Appetite Fair   Moderate Malnutrition                Anthropometrics       04/01/17 1004    Anthropometrics    Height Method Stated    Height 162.6 cm (64\")    Weight Method Bed scale    Weight 46 kg (101 lb 6.6 oz)    Ideal Body Weight (IBW)    Ideal Body Weight (IBW), Female 55.4    % Ideal Body Weight 83.2    % Ideal Body Weight Malnutrition 80-90% - mild deficit    Body Mass Index (BMI)    BMI (kg/m2) 17.44    BMI Grade 17 - 19 low grade I            Labs/Tests/Procedures/Meds       04/01/17 1005    Labs/Tests/Procedures/Meds    Labs/Tests Review Reviewed    Medication Review Reviewed, pertinent;Antibiotic              Estimated/Assessed Needs       04/01/17 1005    Calculation Measurements    Weight Used For Calculations 55.4 kg (122 lb 2.2 oz)   IBW    Height Used for Calculations 1.626 m (5' 4\")    Estimated/Assessed Energy Needs    Energy Need Method Rosalie-St Naelor    Age 77    RMR (Rosalie-St. Jeor Equation) 1024    Activity Factors (Rosalie St Naelor)  Out of bed, ambulatory  1.3    Estimated Kcal Range  ~7958-4987 Kcal    Estimated/Assessed Protein Needs    Weight Used for Protein Calculation 55.4 kg (122 lb 2.2 oz)   IBW    Protein (gm/kg) 1.5    1.5 Gm Protein (gm) 83.1    Estimated Protein Range ~66.48-83.1 gm    Estimated/Assessed Fluid Needs    Fluid Need Method RDA method    RDA Method (mL)  1631              Evaluation of Received Nutrient/Fluid Intake "       04/01/17 1005    PO Evaluation    Number of Days PO Intake Evaluated 1 day    Number of Meals 1    % PO Intake 50              Malnutrition Severity Assessment       04/01/17 1008    Malnutrition Severity Assessment    Malnutrition Type Chronic Illness Malnutrition    Physical Signs of Malnutrition (Chronic)    Muscle Wasting Mild    Fat Loss Mild    Fluid Accumulation None    Secondary Physical Signs None    Weight Status (Chronic)    BMI Mild (<19)    %IBW Mild (<90%)    Criteria Met (Must meet criteria for severity in at least 2 of these categories: M Wasting, Fat Loss, Fluid, Secondary Signs, Wt. Status, Intake)    Patient meets criteria for  Moderate malnutrition          Problem/Interventions:        Problem 1       04/01/17 1008    Nutrition Diagnoses Problem 1    Problem 1 Underweight    Etiology (related to) Factors Affecting Nutrition    Appetite Fair    Signs/Symptoms (evidenced by) BMI    BMI 17 - 17.9            Problem 2       04/01/17 1009    Nutrition Diagnoses Problem 2    Problem 2 Increased Nutrient Needs    Macronutrient Kcal;Fluid;Protein    Micronutrient Vitamin;Mineral    Etiology (related to) Medical Diagnosis    Pulmonary/Critical Care COPD    Signs/Symptoms (evidenced by) Other (comment)   increased demand for nutrients secondary to pulmonary dysfunction                  Intervention Goal       04/01/17 1009    Intervention Goal    General Meet nutritional needs for age/condition    PO PO intake (%)    PO Intake % 75 %            Nutrition Intervention       04/01/17 1010    Nutrition Intervention    RD/Tech Action Encourage intake;Supplement provided;Follow Tx progress;Advise available snack;Interview for preference            Nutrition Prescription       04/01/17 1010    Nutrition Prescription PO    PO Prescription Begin/change diet;Begin/change supplement    Supplement Ensure Complete;Magic Cup    Supplement Frequency 2 times a day    Other Modifiers High protein/high calorie     New PO Prescription Ordered? No, recommended   supplement ordered    Other Orders    Obtain Weight Daily    Obtain Weight Ordered? Yes    Supplement Vitamin mineral supplement   Consider adding Vitamin D3, Vitamin B12 and Omega 3 fatty acids .     Supplement Ordered? No, recommended    Other Consider adding appetite stimulant; Consider speech evaluation secondary to MS diagnosis            Education/Evaluation       04/01/17 1011    Education    Education Provided education regarding;Education topics    Provided education regarding Diet rationale    Education Topics Weight grain strategy;Other (comment)   Pulmonary nutrition therapy    Monitor/Evaluation    Monitor Per protocol;PO intake;Supplement intake;Pertinent labs;Weight        Comments:   Rec. #1: Consider adding High Calorie, High Protein to current diet order. Rec. #2: Consider speech evaluation secondary to pt. With MS diagnosis. Rec. #3: Consider adding appetite stimulant. Rec. #4: Consider adding MVI with minerals daily. Rec. #5: Consider checking pt.'s Vitamin D3 and Vitamin B12 levels and supplementing as needed along with adding Omega 3 Fatty acids secondary to MS diagnosis. RD added Cranberry Juice to the breakfast tray. Pt to also receive Ensure BID and Magic Cup BID. RD to follow pt. Consult JEANNE PRN.          Electronically signed by:  Keesha Vu RD  04/01/17 10:17 AM

## 2017-04-02 VITALS
BODY MASS INDEX: 18.2 KG/M2 | SYSTOLIC BLOOD PRESSURE: 101 MMHG | OXYGEN SATURATION: 93 % | RESPIRATION RATE: 16 BRPM | TEMPERATURE: 98.1 F | HEIGHT: 64 IN | WEIGHT: 106.6 LBS | HEART RATE: 64 BPM | DIASTOLIC BLOOD PRESSURE: 58 MMHG

## 2017-04-02 LAB — BACTERIA SPEC AEROBE CULT: NORMAL

## 2017-04-02 PROCEDURE — 94799 UNLISTED PULMONARY SVC/PX: CPT

## 2017-04-02 PROCEDURE — 97162 PT EVAL MOD COMPLEX 30 MIN: CPT

## 2017-04-02 PROCEDURE — G8978 MOBILITY CURRENT STATUS: HCPCS

## 2017-04-02 PROCEDURE — 99217 PR OBSERVATION CARE DISCHARGE MANAGEMENT: CPT | Performed by: INTERNAL MEDICINE

## 2017-04-02 PROCEDURE — G8979 MOBILITY GOAL STATUS: HCPCS

## 2017-04-02 PROCEDURE — G0378 HOSPITAL OBSERVATION PER HR: HCPCS

## 2017-04-02 PROCEDURE — G8980 MOBILITY D/C STATUS: HCPCS

## 2017-04-02 PROCEDURE — 25010000002 ENOXAPARIN PER 10 MG: Performed by: INTERNAL MEDICINE

## 2017-04-02 PROCEDURE — 96372 THER/PROPH/DIAG INJ SC/IM: CPT

## 2017-04-02 RX ORDER — AZITHROMYCIN 250 MG/1
250 TABLET, FILM COATED ORAL DAILY
Qty: 4 TABLET | Refills: 0 | Status: SHIPPED | OUTPATIENT
Start: 2017-04-02 | End: 2017-04-06

## 2017-04-02 RX ADMIN — IPRATROPIUM BROMIDE AND ALBUTEROL SULFATE 3 ML: .5; 3 SOLUTION RESPIRATORY (INHALATION) at 00:42

## 2017-04-02 RX ADMIN — PAROXETINE HYDROCHLORIDE 10 MG: 20 TABLET, FILM COATED ORAL at 06:04

## 2017-04-02 RX ADMIN — MULTIPLE VITAMINS W/ MINERALS TAB 1 TABLET: TAB at 08:35

## 2017-04-02 RX ADMIN — IPRATROPIUM BROMIDE AND ALBUTEROL SULFATE 3 ML: .5; 3 SOLUTION RESPIRATORY (INHALATION) at 07:06

## 2017-04-02 RX ADMIN — GABAPENTIN 300 MG: 300 CAPSULE ORAL at 08:35

## 2017-04-02 RX ADMIN — NICOTINE 1 PATCH: 14 PATCH TRANSDERMAL at 02:23

## 2017-04-02 RX ADMIN — AZITHROMYCIN MONOHYDRATE 250 MG: 250 TABLET ORAL at 08:35

## 2017-04-02 RX ADMIN — BUDESONIDE 0.5 MG: 0.5 INHALANT RESPIRATORY (INHALATION) at 07:06

## 2017-04-02 RX ADMIN — PROPRANOLOL HYDROCHLORIDE 40 MG: 10 TABLET ORAL at 08:35

## 2017-04-02 RX ADMIN — SODIUM CHLORIDE 100 ML/HR: 9 INJECTION, SOLUTION INTRAVENOUS at 06:04

## 2017-04-02 RX ADMIN — ENOXAPARIN SODIUM 40 MG: 40 INJECTION SUBCUTANEOUS at 08:34

## 2017-04-02 RX ADMIN — IPRATROPIUM BROMIDE AND ALBUTEROL SULFATE 3 ML: .5; 3 SOLUTION RESPIRATORY (INHALATION) at 13:13

## 2017-04-02 NOTE — DISCHARGE SUMMARY
[facility]   DISCHARGE SUMMARY      Name:  Maria Dolores Campa   Age:  77 y.o.  Sex:  female  :  1940  MRN:  [id number]   Visit Number:  [id number]  Primary Care Physician:  Gregorio Jackson MD  Date of Discharge:  2017  Admission Date:  3/31/2017      Discharge Diagnosis:     Principal Problem:    Urinary tract infection without hematuria  Active Problems:    Acute exacerbation of chronic obstructive pulmonary disease (COPD)    Moderate malnutrition    Acute metabolic encephalopathy          Consults:   Consulting Physician(s)          None         Consults     No orders found from 3/2/2017 to 2017.          Procedures Performed:    None           Hospital Course:   The patient was admitted on 3/31/2017  Please see H&P for details on admission HPI and ROS.  This is a 77-year-old female with history of COPD not using home oxygen, who continues to smoke, was brought to the emergency room by her  and daughter with complaints of worsening shortness of breath, confusion and generalized weakness in the last several days. Patient was seen and examined in the emergency room at 00:30 today. The history is obtained from the  and daughter were at the bedside. The patient started having worsening of her cough with white sputum about 1 week ago. This has progressively worsened associated with some confusion and generalized weakness. There is no history of fever, nausea or vomiting. No history of abdominal pain. Patient was brought into the emergency room by the family.     Patient was evaluated in the emergency room. Her temperature was 99.3. Pulse 82, blood pressure 114/96 and her pulse oxygen saturation was 92% on room air. Blood work done in the emergency room was fairly stable. Urinalysis showed urinary tract infection. Chest x-ray was negative for any infiltrates. She was placed on oxygen and was given Rocephin for urinary tract infection and is currently  being admitted to the medical floor. She complains of shortness of breath and back pain at this time. Her last admission to this facility was in December 2016 for COPD exacerbation.     Patient use with her  and uses a walker to ambulate. She smokes half pack of cigarettes per day.  During the hospitalization that you a did not show any infection that was multiple organisms growing.  She did not have any symptoms.  She received some Rocephin.  She also got the Zithromax that will be continued for her bronchitis as an outpatient.  Until she gets to go back and follow up with Dr. Jackson.  On the day of discharge she walked the hallway without any problem.    Vital Signs:    Temp:  [98 °F (36.7 °C)-98.1 °F (36.7 °C)] 98.1 °F (36.7 °C)  Heart Rate:  [60-80] 62  Resp:  [16-18] 16  BP: (101-116)/(58-84) 101/58            Physical Exam:   General Appearance: alert, oriented x 3, no acute distress,   HEENT: pupils round and reactive to light, oral mucosa dry, extra occular movements intact.  Neck: supple, no JVD, trachea midline  Lungs: Decreased breath sounds all over the chest no wheezing or rhonchi heard.  Heart: RRR, normal S1 and S2, no S3, no rub  Abdomen: soft, non-tender, no palpable bladder, present bowel sounds to auscultation  Extremities: no edema, cyanosis or clubbing.   Neuro: normal speech and mental status, grossly non focal.    Pertinent Lab Results:       Results from last 7 days  Lab Units 04/01/17  0515 03/31/17  2217   SODIUM mmol/L 138 138   POTASSIUM mmol/L 3.6 3.8   CHLORIDE mmol/L 102 98   TOTAL CO2 mmol/L 24.0* 26.0   BUN mg/dL 19 19   CREATININE mg/dL 0.60 0.70   CALCIUM mg/dL 8.7 9.4   BILIRUBIN mg/dL  --  0.5   ALK PHOS U/L  --  134*   ALT (SGPT) U/L  --  26   AST (SGOT) U/L  --  30   GLUCOSE mg/dL 90 110*       Results from last 7 days  Lab Units 04/01/17  0515 03/31/17  2217   WBC 10*3/mm3 4.90 5.66   HEMOGLOBIN g/dL 13.8 15.3   HEMATOCRIT % 41.8 46.1   PLATELETS 10*3/mm3 190 199          Urine Culture   Date Value Ref Range Status   03/31/2017 Mixed Culture  Final       Pertinent Radiology Results:  Imaging Results (most recent)     Procedure Component Value Units Date/Time    XR Chest 2 View [[id number]] Collected:  03/31/17 2323     Updated:  03/31/17 2325    Narrative:       FINAL REPORT    TECHNIQUE:  AP and lateral views of the chest were obtained.    CLINICAL HISTORY:  SOA.BACK PAIN.    FINDINGS:  There is elevation of the left hemidiaphragm. The lungs are clear. There is no pleural disease. A skin fold projecting over the left lower lung field should not be confused with a pneumothorax. There are old right lower posterior rib fractures. There is   no acute osseous abnormality.      Impression:       No acute disease.    Authenticated by Wilbert Boles M.D. on 03/31/2017 11:23:30 PM    CT Head Without Contrast [[id number]] Collected:  03/31/17 2337     Updated:  03/31/17 2339    Narrative:       FINAL REPORT    TECHNIQUE:  Routine axial images through the head were obtained without contrast.    CLINICAL HISTORY:  AMS    FINDINGS:  The ventricles are dilated, proportionate to the degree of generalized atrophy.  Periventricular and deep white matter low-attenuation areas are consistent with chronic small vessel ischemia.  There is no mass or shift in midline structures.  There is no   intracranial hemorrhage.  There is no significant sinus or osseous abnormality.      Impression:       No acute intracranial abnormality.    Authenticated by Wilbert Boles M.D. on 03/31/2017 11:37:23 PM                  Discharge Disposition:    Home or Self Care    Discharge Medication:     Maria Dolores Campa   Home Medication Instructions MARLY:[id number]    Printed on:04/02/17 1133   Medication Information                      azithromycin (ZITHROMAX) 250 MG tablet  Take 1 tablet by mouth Daily for 4 days. Take 2 tablets the first day, then 1 tablet daily for 4 days.  Indications: Pneumonia             gabapentin  (NEURONTIN) 300 MG capsule  Take 300 mg by mouth.             HYDROCODONE-ACETAMINOPHEN PO  Take  by mouth.             PARoxetine (PAXIL) 10 MG tablet  Take 10 mg by mouth Every Morning.             propranolol (INDERAL) 40 MG tablet  Take 40 mg by mouth 3 (Three) Times a Day.             traMADol (ULTRAM) 50 MG tablet  Take 50 mg by mouth Every 6 (Six) Hours As Needed for Moderate Pain (4-6).                 Discharge Diet:     Diet Instructions     Advance Diet As Tolerated                         Follow-up Appointments:    No future appointments.  Additional Instructions for the Follow-ups that You Need to Schedule     Discharge Follow-up with PCP    As directed    Follow Up Details:  Within 2 weeks                 Test Results Pending at Discharge:           Cameron Currie MD  04/02/17  11:39 AM    Time: Discharge 20 min

## 2017-04-02 NOTE — PLAN OF CARE
Problem: Patient Care Overview (Adult)  Goal: Plan of Care Review  Outcome: Ongoing (interventions implemented as appropriate)    04/02/17 0325   Coping/Psychosocial Response Interventions   Plan Of Care Reviewed With patient   Patient Care Overview   Progress progress toward functional goals as expected   Outcome Evaluation   Outcome Summary/Follow up Plan PT RESTING BETTER, IMPROVED BLADDER CONTROL         Problem: Fall Risk (Adult)  Goal: Identify Related Risk Factors and Signs and Symptoms  Outcome: Ongoing (interventions implemented as appropriate)  Goal: Absence of Falls  Outcome: Ongoing (interventions implemented as appropriate)    Problem: Urine Elimination, Impaired (Adult)  Goal: Identify Related Risk Factors and Signs and Symptoms  Outcome: Ongoing (interventions implemented as appropriate)  Goal: Effective Urinary Elimination  Outcome: Ongoing (interventions implemented as appropriate)  Goal: Effective Containment of Urine  Outcome: Ongoing (interventions implemented as appropriate)  Goal: Reduced Incontinence Episodes  Outcome: Ongoing (interventions implemented as appropriate)

## 2017-04-02 NOTE — PROGRESS NOTES
Acute Care - Physical Therapy Initial Evaluation  [facility]     Patient Name: Maria Dolores Campa  : 1940  MRN: [id number]  Today's Date: 2017   Onset of Illness/Injury or Date of Surgery Date: 17  Date of Referral to PT: 17  Referring Physician: Sandoval Falcon MD      Admit Date: 3/31/2017     Visit Dx:    ICD-10-CM ICD-9-CM   1. Urinary tract infection without hematuria, site unspecified N39.0 599.0   2. Confusion R41.0 298.9   3. Impaired functional mobility, balance, gait, and endurance Z74.09 V49.89     Patient Active Problem List   Diagnosis   • Urinary tract infection without hematuria   • Acute exacerbation of chronic obstructive pulmonary disease (COPD)   • Moderate malnutrition   • Acute metabolic encephalopathy     Past Medical History:   Diagnosis Date   • COPD (chronic obstructive pulmonary disease)    • MS (multiple sclerosis)      Past Surgical History:   Procedure Laterality Date   • CHOLECYSTECTOMY     • EYE SURGERY      Corneal implant          PT ASSESSMENT (last 72 hours)      PT Evaluation       17 0933 17 0142    Rehab Evaluation    Document Type evaluation  -JR     Subjective Information agree to therapy  -JR     Patient Effort, Rehab Treatment good  -JR     Symptoms Noted During/After Treatment fatigue  -JR     General Information    Patient Profile Review yes  -JR     Onset of Illness/Injury or Date of Surgery Date 17  -JR     Referring Physician Sandoval Falcon MD  -JR     General Observations Supine in bed, IV in RUE, feeling much better than yesterday  -JR     Pertinent History Of Current Problem MS, weakness, UTI  -JR     Precautions/Limitations fall precautions  -JR     Prior Level of Function independent:;all household mobility  -JR     Equipment Currently Used at Home walker, rolling  -JR     Plans/Goals Discussed With patient;agreed upon  -JR     Risks Reviewed patient:;increased discomfort  -JR     Benefits Reviewed patient:;increase  balance;increase strength;increase independence;improve function  -JR     Barriers to Rehab medically complex  -JR     Living Environment    Lives With spouse  -JR spouse  -RE    Living Arrangements house  -JR house  -RE    Home Accessibility no concerns  -JR no concerns  -RE    Stair Railings at Home  none  -RE    Type of Financial/Environmental Concern  none  -RE    Transportation Available  car  -RE    Clinical Impression    Date of Referral to PT 04/01/17  -JR     PT Diagnosis Weakness, poor balance and righting reactions  -JR     Prognosis Fair  -JR     Patient/Family Goals Statement Patient wants to go home  -JR     Criteria for Skilled Therapeutic Interventions Met yes;treatment indicated  -JR     Pathology/Pathophysiology Noted (Describe Specifically for Each System) musculoskeletal;neuromuscular  -JR     Impairments Found (describe specific impairments) gait, locomotion, and balance  -JR     Rehab Potential fair, will monitor progress closely  -JR     Pain Assessment    Pain Assessment No/denies pain  -JR     Cognitive Assessment/Intervention    Current Cognitive/Communication Assessment functional  -JR     Orientation Status oriented x 4  -JR     Follows Commands/Answers Questions able to follow multi-step instructions  -JR     Personal Safety decreased awareness, need for safety  -JR     Personal Safety Interventions fall prevention program maintained  -JR     ROM (Range of Motion)    General ROM no range of motion deficits identified  -JR     MMT (Manual Muscle Testing)    General MMT Assessment Detail Functional 3+/5  -JR     Bed Mobility, Assessment/Treatment    Bed Mobility, Assistive Device head of bed elevated;bed rails  -JR     Bed Mob, Supine to Sit, [place] independent  -JR     Bed Mob, Sit to Supine, [place] independent  -JR     Transfer Assessment/Treatment    Transfers, Sit-Stand [place] contact guard assist  -JR     Transfers, Stand-Sit [place] contact guard assist  -JR      Transfers, Sit-Stand-Sit, Assist Device rolling walker  -     Transfer, Impairments strength decreased;coordination impaired  -     Gait Assessment/Treatment    Gait, [place] Level contact guard assist  -JR     Gait, Assistive Device rollator  -     Gait, Distance (Feet) 210  -JR     Gait, Gait Pattern Analysis swing-through gait  -     Gait, Gait Deviations ataxia;stride width increased;forward flexed posture  -     Gait, Safety Issues balance decreased during turns  -     Gait, Impairments impaired balance;coordination impaired  -     Positioning and Restraints    Pre-Treatment Position in bed  -JR     Post Treatment Position bed  -JR     In Bed fowlers;call light within reach;encouraged to call for assist;exit alarm on  -       04/01/17 0134       General Information    Equipment Currently Used at Home walker, rolling;cane, straight  -RE       User Key  (r) = Recorded By, (t) = Taken By, (c) = Cosigned By    Initials Name Provider Type    JR Argelia Shah, PT Physical Therapist    AWA Hemphill RN Registered Nurse          Physical Therapy Education     Title: PT OT SLP Therapies (Active)     Topic: Physical Therapy (Active)     Point: Mobility training (Done)    Learning Progress Summary    Learner Readiness Method Response Comment Documented by Status   Patient Acceptance E VU Instructed on safety and use of rollator  04/02/17 1036 Done               Point: Precautions (Done)    Learning Progress Summary    Learner Readiness Method Response Comment Documented by Status   Patient Acceptance E VU Instructed on safety and use of rollator  04/02/17 1036 Done                      User Key     Initials Effective Dates Name Provider Type Discipline     10/26/16 -  Argelia Shah, PT Physical Therapist PT                PT Recommendation and Plan  Anticipated Discharge Disposition: home  Planned Therapy Interventions: strengthening, balance training, transfer training, gait training,  patient/family education  PT Frequency: daily  Plan of Care Review  Plan Of Care Reviewed With: patient  Outcome Summary/Follow up Plan: Patient participates well in PT evaluation and is expected to benefit from additional PT while hospitalized.  She is eager to return home and will continue her home program independently.            IP PT Goals       04/02/17 1041          Transfer Training PT LTG    Transfer Training PT LTG, Date Established 04/02/17  -JR      Transfer Training PT LTG, Time to Achieve 5 - 7 days  -JR      Transfer Training PT LTG, Activity Type bed to chair /chair to bed;sit to stand/stand to sit  -JR      Transfer Training PT LTG, [place] Level conditional independence  -JR      Transfer Training PT LTG, Assist Device walker, rolling  -JR      Transfer Training PT  LTG, Date Goal Reviewed 04/02/17  -JR      Transfer Training PT LTG, Outcome goal ongoing  -JR      Gait Training PT LTG    Gait Training Goal PT LTG, Date Established 04/02/17  -JR      Gait Training Goal PT LTG, Time to Achieve 5 - 7 days  -JR      Gait Training Goal PT LTG, [place] Level supervision required  -JR      Gait Training Goal PT LTG, Assist Device walker, rolling  -JR      Gait Training Goal PT LTG, Distance to Achieve 350  -JR      Gait Training Goal PT LTG, Date Goal Reviewed 04/02/17  -JR      Gait Training Goal PT LTG, Outcome goal ongoing  -JR        User Key  (r) = Recorded By, (t) = Taken By, (c) = Cosigned By    Initials Name Provider Type    JR Argelia Shah, PT Physical Therapist                Outcome Measures       04/02/17 0933          How much help from another person do you currently need...    Turning from your back to your side while in flat bed without using bedrails? 3  -JR      Moving from lying on back to sitting on the side of a flat bed without bedrails? 3  -JR      Moving to and from a bed to a chair (including a wheelchair)? 3  -JR      Standing up from a chair using your arms (e.g.,  wheelchair, bedside chair)? 3  -JR      Climbing 3-5 steps with a railing? 2  -JR      To walk in hospital room? 3  -JR      AM-PAC 6 Clicks Score 17  -JR      Functional Assessment    Outcome Measure Options AM-PAC 6 Clicks Basic Mobility (PT)  -JR        User Key  (r) = Recorded By, (t) = Taken By, (c) = Cosigned By    Initials Name Provider Type    JR Argelia Shah PT Physical Therapist           Time Calculation:         PT Charges       04/02/17 1039          Time Calculation    Start Time 0933  -JR      PT Received On 04/02/17  -      PT Goal Re-Cert Due Date 04/12/17  -        User Key  (r) = Recorded By, (t) = Taken By, (c) = Cosigned By    Initials Name Provider Type    JR Argelia Shah PT Physical Therapist          Therapy Charges for Today     Code Description Service Date Service Provider Modifiers Qty    69042486173 HC PT MOBILITY CURRENT 4/2/2017 Argelia Shah, PT GP, CK 1    46188710669 HC PT MOBILITY PROJECTED 4/2/2017 Argelia Shah, PT GP, CJ 1    14895234844 HC PT EVAL MOD COMPLEXITY 4 4/2/2017 Argelia Shah, PT GP 1          PT G-Codes  PT Professional Judgement Used?: Yes  Outcome Measure Options: AM-PAC 6 Clicks Basic Mobility (PT)  Score: 17  Functional Limitation: Mobility: Walking and moving around  Mobility: Walking and Moving Around Current Status (): At least 40 percent but less than 60 percent impaired, limited or restricted  Mobility: Walking and Moving Around Goal Status (): At least 20 percent but less than 40 percent impaired, limited or restricted      Argelia Shah, PT  4/2/2017

## 2017-04-02 NOTE — PLAN OF CARE
Problem: Patient Care Overview (Adult)  Goal: Plan of Care Review  Outcome: Ongoing (interventions implemented as appropriate)    04/02/17 1041   Coping/Psychosocial Response Interventions   Plan Of Care Reviewed With patient   Outcome Evaluation   Outcome Summary/Follow up Plan Patient participates well in PT evaluation and is expected to benefit from additional PT while hospitalized. She is eager to return home and will continue her home program independently.          Problem: Inpatient Physical Therapy  Goal: Transfer Training Goal 1 LTG- PT  Outcome: Ongoing (interventions implemented as appropriate)    04/02/17 1041   Transfer Training PT LTG   Transfer Training PT LTG, Date Established 04/02/17   Transfer Training PT LTG, Time to Achieve 5 - 7 days   Transfer Training PT LTG, Activity Type bed to chair /chair to bed;sit to stand/stand to sit   Transfer Training PT LTG, [place] Level conditional independence   Transfer Training PT LTG, Assist Device walker, rolling   Transfer Training PT LTG, Date Goal Reviewed 04/02/17   Transfer Training PT LTG, Outcome goal ongoing       Goal: Gait Training Goal LTG- PT  Outcome: Ongoing (interventions implemented as appropriate)    04/02/17 1041   Gait Training PT LTG   Gait Training Goal PT LTG, Date Established 04/02/17   Gait Training Goal PT LTG, Time to Achieve 5 - 7 days   Gait Training Goal PT LTG, [place] Level supervision required   Gait Training Goal PT LTG, Assist Device walker, rolling   Gait Training Goal PT LTG, Distance to Achieve 350   Gait Training Goal PT LTG, Date Goal Reviewed 04/02/17   Gait Training Goal PT LTG, Outcome goal ongoing

## 2017-04-04 PROCEDURE — G8979 MOBILITY GOAL STATUS: HCPCS

## 2017-04-04 PROCEDURE — G8980 MOBILITY D/C STATUS: HCPCS

## 2017-04-04 NOTE — THERAPY DISCHARGE NOTE
Acute Care - Physical Therapy Discharge Summary   [facility]       Patient Name: Maria Dolores Campa  : 1940  MRN: [id number]    Today's Date: 2017  Onset of Illness/Injury or Date of Surgery Date: 17    Date of Referral to PT: 17  Referring Physician: Sandoval Falcon MD      Admit Date: 3/31/2017      PT Recommendation and Plan    Visit Dx:    ICD-10-CM ICD-9-CM   1. Urinary tract infection without hematuria, site unspecified N39.0 599.0   2. Confusion R41.0 298.9   3. Impaired functional mobility, balance, gait, and endurance Z74.09 V49.89             Outcome Measures       17 0933          How much help from another person do you currently need...    Turning from your back to your side while in flat bed without using bedrails? 3  -JR      Moving from lying on back to sitting on the side of a flat bed without bedrails? 3  -JR      Moving to and from a bed to a chair (including a wheelchair)? 3  -JR      Standing up from a chair using your arms (e.g., wheelchair, bedside chair)? 3  -JR      Climbing 3-5 steps with a railing? 2  -JR      To walk in hospital room? 3  -JR      AM-PAC 6 Clicks Score 17  -JR      Functional Assessment    Outcome Measure Options AM-PAC 6 Clicks Basic Mobility (PT)  -JR        User Key  (r) = Recorded By, (t) = Taken By, (c) = Cosigned By    Initials Name Provider Type    JR Argelia Shah, PT Physical Therapist                      IP PT Goals       17 1700 17 1041       Transfer Training PT LTG    Transfer Training PT LTG, Date Established  17  -JR     Transfer Training PT LTG, Time to Achieve  5 - 7 days  -JR     Transfer Training PT LTG, Activity Type  bed to chair /chair to bed;sit to stand/stand to sit  -JR     Transfer Training PT LTG, [place] Level  conditional independence  -JR     Transfer Training PT LTG, Assist Device  walker, rolling  -JR     Transfer Training PT  LTG, Date Goal Reviewed  17  -JR     Transfer Training PT  LTG, Outcome  goal ongoing  -JR     Transfer Training PT LTG, Reason Goal Not Met discharged from facility  -      Gait Training PT LTG    Gait Training Goal PT LTG, Date Established  04/02/17  -     Gait Training Goal PT LTG, Time to Achieve  5 - 7 days  -JR     Gait Training Goal PT LTG, [place] Level  supervision required  -JR     Gait Training Goal PT LTG, Assist Device  walker, rolling  -JR     Gait Training Goal PT LTG, Distance to Achieve  350  -JR     Gait Training Goal PT LTG, Date Goal Reviewed  04/02/17  -     Gait Training Goal PT LTG, Outcome  goal ongoing  -JR     Gait Training Goal PT LTG, Reason Goal Not Met discharged from facility  -        User Key  (r) = Recorded By, (t) = Taken By, (c) = Cosigned By    Initials Name Provider Type    JR Argelia Shah PT Physical Therapist          Therapy Charges for Today     Code Description Service Date Service Provider Modifiers Qty    36187212016 HC PT MOBILITY PROJECTED 4/4/2017 Argelia Shah, PT GP, CJ 1    15110888902 HC PT MOBILITY DISCHARGE 4/4/2017 Argelia Shah, PT GP, CK 1          PT Discharge Summary  Anticipated Discharge Disposition: home  Reason for Discharge: Discharge from facility  Outcomes Achieved: Discharge from facility occurred on same date as evluation  Discharge Destination: Home      Argelia Shah PT   4/4/2017

## 2017-04-04 NOTE — PLAN OF CARE
Problem: Patient Care Overview (Adult)  Goal: Plan of Care Review  Outcome: Unable to achieve outcome(s) by discharge Date Met:  04/04/17 04/04/17 1700   Outcome Evaluation   Outcome Summary/Follow up Plan Patient was discharged on the same day as the evaluation was performed.         Problem: Inpatient Physical Therapy  Goal: Transfer Training Goal 1 LTG- PT  Outcome: Unable to achieve outcome(s) by discharge Date Met:  04/04/17 04/02/17 1041 04/04/17 1700   Transfer Training PT LTG   Transfer Training PT LTG, Date Established 04/02/17 --    Transfer Training PT LTG, Time to Achieve 5 - 7 days --    Transfer Training PT LTG, Activity Type bed to chair /chair to bed;sit to stand/stand to sit --    Transfer Training PT LTG, [place] Level conditional independence --    Transfer Training PT LTG, Assist Device walker, rolling --    Transfer Training PT LTG, Date Goal Reviewed 04/02/17 --    Transfer Training PT LTG, Outcome goal ongoing --    Transfer Training PT LTG, Reason Goal Not Met --  discharged from facility       Goal: Gait Training Goal LTG- PT  Outcome: Unable to achieve outcome(s) by discharge Date Met:  04/04/17 04/02/17 1041 04/04/17 1700   Gait Training PT LTG   Gait Training Goal PT LTG, Date Established 04/02/17 --    Gait Training Goal PT LTG, Time to Achieve 5 - 7 days --    Gait Training Goal PT LTG, [place] Level supervision required --    Gait Training Goal PT LTG, Assist Device walker, rolling --    Gait Training Goal PT LTG, Distance to Achieve 350 --    Gait Training Goal PT LTG, Date Goal Reviewed 04/02/17 --    Gait Training Goal PT LTG, Outcome goal ongoing --    Gait Training Goal PT LTG, Reason Goal Not Met --  discharged from facility

## 2017-04-14 ENCOUNTER — HOSPITAL ENCOUNTER (EMERGENCY)
Facility: HOSPITAL | Age: 77
Discharge: ANOTHER HOSPITAL | End: 2017-04-15
Attending: EMERGENCY MEDICINE | Admitting: EMERGENCY MEDICINE

## 2017-04-14 ENCOUNTER — APPOINTMENT (OUTPATIENT)
Dept: GENERAL RADIOLOGY | Facility: HOSPITAL | Age: 77
End: 2017-04-14

## 2017-04-14 ENCOUNTER — APPOINTMENT (OUTPATIENT)
Dept: CT IMAGING | Facility: HOSPITAL | Age: 77
End: 2017-04-14

## 2017-04-14 DIAGNOSIS — G35 MULTIPLE SCLEROSIS EXACERBATION (HCC): ICD-10-CM

## 2017-04-14 DIAGNOSIS — A41.9 SEPSIS, DUE TO UNSPECIFIED ORGANISM: ICD-10-CM

## 2017-04-14 DIAGNOSIS — J18.9 PNEUMONIA DUE TO INFECTIOUS ORGANISM, UNSPECIFIED LATERALITY, UNSPECIFIED PART OF LUNG: Primary | ICD-10-CM

## 2017-04-14 LAB
ALBUMIN SERPL-MCNC: 4 G/DL (ref 3.5–5)
ALBUMIN/GLOB SERPL: 1 G/DL (ref 1–2)
ALP SERPL-CCNC: 118 U/L (ref 38–126)
ALT SERPL W P-5'-P-CCNC: 29 U/L (ref 13–69)
ANION GAP SERPL CALCULATED.3IONS-SCNC: 13.5 MMOL/L
AST SERPL-CCNC: 27 U/L (ref 15–46)
BACTERIA UR QL AUTO: ABNORMAL /HPF
BASOPHILS # BLD AUTO: 0.04 10*3/MM3 (ref 0–0.2)
BASOPHILS NFR BLD AUTO: 0.3 % (ref 0–2.5)
BILIRUB SERPL-MCNC: 1 MG/DL (ref 0.2–1.3)
BILIRUB UR QL STRIP: NEGATIVE
BUN BLD-MCNC: 21 MG/DL (ref 7–20)
BUN/CREAT SERPL: 30 (ref 7.1–23.5)
CALCIUM SPEC-SCNC: 9.6 MG/DL (ref 8.4–10.2)
CHLORIDE SERPL-SCNC: 101 MMOL/L (ref 98–107)
CLARITY UR: ABNORMAL
CO2 SERPL-SCNC: 28 MMOL/L (ref 26–30)
COLOR UR: YELLOW
CREAT BLD-MCNC: 0.7 MG/DL (ref 0.6–1.3)
D-LACTATE SERPL-SCNC: 1.1 MMOL/L (ref 0.5–2)
DEPRECATED RDW RBC AUTO: 43.3 FL (ref 37–54)
EOSINOPHIL # BLD AUTO: 0.06 10*3/MM3 (ref 0–0.7)
EOSINOPHIL NFR BLD AUTO: 0.5 % (ref 0–7)
ERYTHROCYTE [DISTWIDTH] IN BLOOD BY AUTOMATED COUNT: 12.8 % (ref 11.5–14.5)
GFR SERPL CREATININE-BSD FRML MDRD: 81 ML/MIN/1.73
GLOBULIN UR ELPH-MCNC: 4 GM/DL
GLUCOSE BLD-MCNC: 123 MG/DL (ref 74–98)
GLUCOSE UR STRIP-MCNC: NEGATIVE MG/DL
HCT VFR BLD AUTO: 45 % (ref 37–47)
HGB BLD-MCNC: 14.7 G/DL (ref 12–16)
HGB UR QL STRIP.AUTO: ABNORMAL
HYALINE CASTS UR QL AUTO: ABNORMAL /LPF
IMM GRANULOCYTES # BLD: 0.05 10*3/MM3 (ref 0–0.06)
IMM GRANULOCYTES NFR BLD: 0.4 % (ref 0–0.6)
KETONES UR QL STRIP: ABNORMAL
LEUKOCYTE ESTERASE UR QL STRIP.AUTO: NEGATIVE
LYMPHOCYTES # BLD AUTO: 1.81 10*3/MM3 (ref 0.6–3.4)
LYMPHOCYTES NFR BLD AUTO: 14.2 % (ref 10–50)
MCH RBC QN AUTO: 30.1 PG (ref 27–31)
MCHC RBC AUTO-ENTMCNC: 32.7 G/DL (ref 30–37)
MCV RBC AUTO: 92.2 FL (ref 81–99)
MONOCYTES # BLD AUTO: 1.25 10*3/MM3 (ref 0–0.9)
MONOCYTES NFR BLD AUTO: 9.8 % (ref 0–12)
MUCOUS THREADS URNS QL MICRO: ABNORMAL /HPF
NEUTROPHILS # BLD AUTO: 9.55 10*3/MM3 (ref 2–6.9)
NEUTROPHILS NFR BLD AUTO: 74.8 % (ref 37–80)
NITRITE UR QL STRIP: NEGATIVE
NRBC BLD MANUAL-RTO: 0 /100 WBC (ref 0–0)
PH UR STRIP.AUTO: 6.5 [PH] (ref 5–8)
PLATELET # BLD AUTO: 264 10*3/MM3 (ref 130–400)
PMV BLD AUTO: 11 FL (ref 6–12)
POTASSIUM BLD-SCNC: 4.5 MMOL/L (ref 3.5–5.1)
PROT SERPL-MCNC: 8 G/DL (ref 6.3–8.2)
PROT UR QL STRIP: ABNORMAL
RBC # BLD AUTO: 4.88 10*6/MM3 (ref 4.2–5.4)
RBC # UR: ABNORMAL /HPF
REF LAB TEST METHOD: ABNORMAL
SODIUM BLD-SCNC: 138 MMOL/L (ref 137–145)
SP GR UR STRIP: 1.02 (ref 1–1.03)
SQUAMOUS #/AREA URNS HPF: ABNORMAL /HPF
TROPONIN I SERPL-MCNC: 0.11 NG/ML (ref 0–0.03)
UROBILINOGEN UR QL STRIP: ABNORMAL
WBC NRBC COR # BLD: 12.76 10*3/MM3 (ref 4.8–10.8)
WBC UR QL AUTO: ABNORMAL /HPF

## 2017-04-14 PROCEDURE — 96368 THER/DIAG CONCURRENT INF: CPT

## 2017-04-14 PROCEDURE — 87040 BLOOD CULTURE FOR BACTERIA: CPT | Performed by: EMERGENCY MEDICINE

## 2017-04-14 PROCEDURE — 84484 ASSAY OF TROPONIN QUANT: CPT | Performed by: EMERGENCY MEDICINE

## 2017-04-14 PROCEDURE — 81001 URINALYSIS AUTO W/SCOPE: CPT | Performed by: EMERGENCY MEDICINE

## 2017-04-14 PROCEDURE — 25010000002 METHYLPREDNISOLONE: Performed by: EMERGENCY MEDICINE

## 2017-04-14 PROCEDURE — 96365 THER/PROPH/DIAG IV INF INIT: CPT

## 2017-04-14 PROCEDURE — 96367 TX/PROPH/DG ADDL SEQ IV INF: CPT

## 2017-04-14 PROCEDURE — 25010000002 PIPERACILLIN SOD-TAZOBACTAM PER 1 G: Performed by: EMERGENCY MEDICINE

## 2017-04-14 PROCEDURE — 80053 COMPREHEN METABOLIC PANEL: CPT | Performed by: EMERGENCY MEDICINE

## 2017-04-14 PROCEDURE — 25010000002 VANCOMYCIN PER 500 MG: Performed by: EMERGENCY MEDICINE

## 2017-04-14 PROCEDURE — 94640 AIRWAY INHALATION TREATMENT: CPT

## 2017-04-14 PROCEDURE — 99285 EMERGENCY DEPT VISIT HI MDM: CPT

## 2017-04-14 PROCEDURE — 71010 HC CHEST PA OR AP: CPT

## 2017-04-14 PROCEDURE — 70450 CT HEAD/BRAIN W/O DYE: CPT

## 2017-04-14 PROCEDURE — 83605 ASSAY OF LACTIC ACID: CPT | Performed by: EMERGENCY MEDICINE

## 2017-04-14 PROCEDURE — 93005 ELECTROCARDIOGRAM TRACING: CPT | Performed by: EMERGENCY MEDICINE

## 2017-04-14 PROCEDURE — 96361 HYDRATE IV INFUSION ADD-ON: CPT

## 2017-04-14 PROCEDURE — 85025 COMPLETE CBC W/AUTO DIFF WBC: CPT | Performed by: EMERGENCY MEDICINE

## 2017-04-14 RX ORDER — ACETAMINOPHEN 500 MG
1000 TABLET ORAL ONCE
Status: COMPLETED | OUTPATIENT
Start: 2017-04-14 | End: 2017-04-14

## 2017-04-14 RX ORDER — SODIUM CHLORIDE, SODIUM LACTATE, POTASSIUM CHLORIDE, CALCIUM CHLORIDE 600; 310; 30; 20 MG/100ML; MG/100ML; MG/100ML; MG/100ML
1000 INJECTION, SOLUTION INTRAVENOUS ONCE
Status: COMPLETED | OUTPATIENT
Start: 2017-04-14 | End: 2017-04-15

## 2017-04-14 RX ORDER — IPRATROPIUM BROMIDE AND ALBUTEROL SULFATE 2.5; .5 MG/3ML; MG/3ML
3 SOLUTION RESPIRATORY (INHALATION) ONCE
Status: COMPLETED | OUTPATIENT
Start: 2017-04-14 | End: 2017-04-14

## 2017-04-14 RX ORDER — SODIUM CHLORIDE 0.9 % (FLUSH) 0.9 %
10 SYRINGE (ML) INJECTION AS NEEDED
Status: DISCONTINUED | OUTPATIENT
Start: 2017-04-14 | End: 2017-04-15 | Stop reason: HOSPADM

## 2017-04-14 RX ADMIN — ACETAMINOPHEN 1000 MG: 500 TABLET, COATED ORAL at 23:20

## 2017-04-14 RX ADMIN — SODIUM CHLORIDE, POTASSIUM CHLORIDE, SODIUM LACTATE AND CALCIUM CHLORIDE 1000 ML: 600; 310; 30; 20 INJECTION, SOLUTION INTRAVENOUS at 21:47

## 2017-04-14 RX ADMIN — TAZOBACTAM SODIUM AND PIPERACILLIN SODIUM 3.38 G: 375; 3 INJECTION, SOLUTION INTRAVENOUS at 23:50

## 2017-04-14 RX ADMIN — METHYLPREDNISOLONE SODIUM SUCCINATE 1 G: 1 INJECTION, POWDER, FOR SOLUTION INTRAMUSCULAR; INTRAVENOUS at 22:45

## 2017-04-14 RX ADMIN — IPRATROPIUM BROMIDE AND ALBUTEROL SULFATE 3 ML: .5; 3 SOLUTION RESPIRATORY (INHALATION) at 21:45

## 2017-04-14 RX ADMIN — VANCOMYCIN HYDROCHLORIDE 1 G: 1 INJECTION, POWDER, LYOPHILIZED, FOR SOLUTION INTRAVENOUS at 22:40

## 2017-04-15 VITALS
TEMPERATURE: 97.6 F | OXYGEN SATURATION: 94 % | HEART RATE: 126 BPM | WEIGHT: 100 LBS | SYSTOLIC BLOOD PRESSURE: 90 MMHG | DIASTOLIC BLOOD PRESSURE: 63 MMHG | RESPIRATION RATE: 16 BRPM | BODY MASS INDEX: 18.4 KG/M2 | HEIGHT: 62 IN

## 2017-04-15 LAB
HOLD SPECIMEN: NORMAL
HOLD SPECIMEN: NORMAL
WHOLE BLOOD HOLD SPECIMEN: NORMAL
WHOLE BLOOD HOLD SPECIMEN: NORMAL

## 2017-04-15 PROCEDURE — 96367 TX/PROPH/DG ADDL SEQ IV INF: CPT

## 2017-04-15 PROCEDURE — 25010000002 AZITHROMYCIN: Performed by: EMERGENCY MEDICINE

## 2017-04-15 RX ORDER — SODIUM CHLORIDE, SODIUM LACTATE, POTASSIUM CHLORIDE, CALCIUM CHLORIDE 600; 310; 30; 20 MG/100ML; MG/100ML; MG/100ML; MG/100ML
1000 INJECTION, SOLUTION INTRAVENOUS CONTINUOUS
Status: DISCONTINUED | OUTPATIENT
Start: 2017-04-15 | End: 2017-04-15 | Stop reason: HOSPADM

## 2017-04-15 RX ADMIN — AZITHROMYCIN 500 MG: 500 INJECTION, POWDER, LYOPHILIZED, FOR SOLUTION INTRAVENOUS at 00:55

## 2017-04-15 RX ADMIN — SODIUM CHLORIDE, POTASSIUM CHLORIDE, SODIUM LACTATE AND CALCIUM CHLORIDE 1000 ML/HR: 600; 310; 30; 20 INJECTION, SOLUTION INTRAVENOUS at 02:02

## 2017-04-15 NOTE — ED PROVIDER NOTES
Subjective   HPI Comments: 77-year-old female presenting with general weakness and shortness of breath.  Family states that yesterday she began to have increased cough, shortness of breath, general weakness.  Today the symptoms worsened, she cannot walk so she was brought in for evaluation.  She was just in the hospital 2 weeks ago for UTI and altered mental status.  They deny any fevers, nausea, vomiting, diarrhea.      Review of Systems   Unable to perform ROS: Mental status change       Past Medical History:   Diagnosis Date   • COPD (chronic obstructive pulmonary disease)    • MS (multiple sclerosis)        No Known Allergies    Past Surgical History:   Procedure Laterality Date   • CHOLECYSTECTOMY     • EYE SURGERY      Corneal implant       Family History   Problem Relation Age of Onset   • Family history unknown: Yes       Social History     Social History   • Marital status:      Spouse name: N/A   • Number of children: N/A   • Years of education: N/A     Social History Main Topics   • Smoking status: Current Every Day Smoker     Packs/day: 0.50     Types: Cigarettes   • Smokeless tobacco: None   • Alcohol use No   • Drug use: No   • Sexual activity: Defer     Other Topics Concern   • None     Social History Narrative           Objective   Physical Exam   Constitutional: No distress.   Frail, chronically ill-appearing, acutely sick   HENT:   Head: Normocephalic and atraumatic.   Right Ear: External ear normal.   Left Ear: External ear normal.   Nose: Nose normal.   Mouth/Throat: Oropharynx is clear and moist.   Eyes: Conjunctivae and EOM are normal. Pupils are equal, round, and reactive to light.   Neck: Normal range of motion. Neck supple.   Cardiovascular: Regular rhythm, normal heart sounds and intact distal pulses.    Tachycardic   Pulmonary/Chest:   Tachypneic, increased work of breathing, coarse breath sounds throughout both lung fields   Abdominal: Soft. Bowel sounds are normal. She exhibits no  distension. There is no tenderness. There is no rebound and no guarding.   Musculoskeletal: Normal range of motion. She exhibits no edema, tenderness or deformity.   Neurological: She is alert.   Normal strength bilateral upper and lower extremities, decreased sensation, depressed reflexes bilaterally   Skin: Skin is warm and dry. No rash noted.   Psychiatric:   Cannot assess   Nursing note and vitals reviewed.      Critical Care  Performed by: TUCKER BONNER  Authorized by: TUCKER BONNER   Critical care time was exclusive of separately billable procedures and treating other patients.  Critical care was necessary to treat or prevent imminent or life-threatening deterioration of the following conditions: respiratory failure, CNS failure or compromise and sepsis.  Critical care was time spent personally by me on the following activities: discussions with consultants, evaluation of patient's response to treatment, obtaining history from patient or surrogate, ordering and review of laboratory studies, pulse oximetry, review of old charts, re-evaluation of patient's condition, ordering and review of radiographic studies, ordering and performing treatments and interventions, examination of patient and development of treatment plan with patient or surrogate.               ED Course  ED Course                  MDM  Number of Diagnoses or Management Options  Multiple sclerosis exacerbation:   Pneumonia due to infectious organism, unspecified laterality, unspecified part of lung:   Sepsis, due to unspecified organism:   Diagnosis management comments: 77-year-old female with difficulty walking and cough and shortness of breath.  Chronically ill-appearing acutely sick female with exam as above.  I think she likely has either pulmonary or other infection causing a MS exacerbation.  We will treat with fluids, high-dose steroids, broad-spectrum antibiotics.  We'll check labs and EKG and chest x-ray.  Disposition is  to the hospital.  -labs  -ekg  -cxr  -ct  -ivf  -iv meds  -nebs    Ddx: copd, pna, bronchitis, uti, dehydration, sepsis, lyte abnormality, ms exacerbation    Ekg: Sinus tachycardia, normal axis/intervals, ST depressions inferiorly, no ST elevations    Labs notable for elevated troponin and  leukocytosis.  Chest x-ray per my read right lower lobe infiltrate.  This is new from 2 week ago.  CT head without acute findings per radiology.  After treatment here she has improved, her heart rate is improved, she is stable on nasal cannula.  We have no neurology coverage or ICU beds.  Discussed case with  for transfer.       Amount and/or Complexity of Data Reviewed  Decide to obtain previous medical records or to obtain history from someone other than the patient: yes    Critical Care  Total time providing critical care: 30-74 minutes      Final diagnoses:   Pneumonia due to infectious organism, unspecified laterality, unspecified part of lung   Sepsis, due to unspecified organism   Multiple sclerosis exacerbation            Solis Jack MD  04/14/17 7307

## 2017-04-15 NOTE — ED NOTES
[facility] called for possible admission, stated that they had a pending wait list for tele beds.     Buffy Hermosillo  04/15/17 0121

## 2017-04-15 NOTE — ED NOTES
"Daughter reports she does not want patient to go to . States \"she will just have to wait in the ER all night and I don't want to put her through that.     Dr. Jack aware. He reports he will go to bedside momentarily to speak with her.      Joaquim Barbour RN  04/15/17 0017    "

## 2017-04-19 LAB
BACTERIA SPEC AEROBE CULT: NORMAL
BACTERIA SPEC AEROBE CULT: NORMAL

## 2017-07-19 ENCOUNTER — APPOINTMENT (OUTPATIENT)
Dept: CT IMAGING | Facility: HOSPITAL | Age: 77
End: 2017-07-19

## 2017-07-19 ENCOUNTER — HOSPITAL ENCOUNTER (EMERGENCY)
Facility: HOSPITAL | Age: 77
Discharge: HOME | End: 2017-07-19
Attending: STUDENT IN AN ORGANIZED HEALTH CARE EDUCATION/TRAINING PROGRAM | Admitting: STUDENT IN AN ORGANIZED HEALTH CARE EDUCATION/TRAINING PROGRAM

## 2017-07-19 ENCOUNTER — APPOINTMENT (OUTPATIENT)
Dept: GENERAL RADIOLOGY | Facility: HOSPITAL | Age: 77
End: 2017-07-19
Attending: STUDENT IN AN ORGANIZED HEALTH CARE EDUCATION/TRAINING PROGRAM

## 2017-07-19 VITALS
BODY MASS INDEX: 16.22 KG/M2 | OXYGEN SATURATION: 92 % | WEIGHT: 107 LBS | TEMPERATURE: 98.2 F | DIASTOLIC BLOOD PRESSURE: 57 MMHG | SYSTOLIC BLOOD PRESSURE: 85 MMHG | HEART RATE: 94 BPM | RESPIRATION RATE: 17 BRPM | HEIGHT: 68 IN

## 2017-07-19 DIAGNOSIS — M25.552 LEFT HIP PAIN: ICD-10-CM
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
## 2017-07-19 LAB
ALBUMIN SERPL-MCNC: 3.9 G/DL (ref 3.5–5)
ALBUMIN/GLOB SERPL: 1.2 G/DL (ref 1–2)
ALP SERPL-CCNC: 111 U/L (ref 38–126)
ALT SERPL W P-5'-P-CCNC: 24 U/L (ref 13–69)
ANION GAP SERPL CALCULATED.3IONS-SCNC: 14.1 MMOL/L
AST SERPL-CCNC: 26 U/L (ref 15–46)
BASOPHILS # BLD AUTO: 0.03 10*3/MM3 (ref 0–0.2)
BASOPHILS NFR BLD AUTO: 0.3 % (ref 0–2.5)
BILIRUB SERPL-MCNC: 0.7 MG/DL (ref 0.2–1.3)
BUN BLD-MCNC: 28 MG/DL (ref 7–20)
BUN/CREAT SERPL: 35 (ref 7.1–23.5)
CALCIUM SPEC-SCNC: 10.1 MG/DL (ref 8.4–10.2)
CHLORIDE SERPL-SCNC: 100 MMOL/L (ref 98–107)
CO2 SERPL-SCNC: 31 MMOL/L (ref 26–30)
CREAT BLD-MCNC: 0.8 MG/DL (ref 0.6–1.3)
DEPRECATED RDW RBC AUTO: 46.5 FL (ref 37–54)
EOSINOPHIL # BLD AUTO: 0.03 10*3/MM3 (ref 0–0.7)
EOSINOPHIL NFR BLD AUTO: 0.3 % (ref 0–7)
ERYTHROCYTE [DISTWIDTH] IN BLOOD BY AUTOMATED COUNT: 13.4 % (ref 11.5–14.5)
GFR SERPL CREATININE-BSD FRML MDRD: 70 ML/MIN/1.73
GLOBULIN UR ELPH-MCNC: 3.2 GM/DL
GLUCOSE BLD-MCNC: 152 MG/DL (ref 74–98)
HCT VFR BLD AUTO: 50.2 % (ref 37–47)
HGB BLD-MCNC: 16.3 G/DL (ref 12–16)
HOLD SPECIMEN: NORMAL
HOLD SPECIMEN: NORMAL
IMM GRANULOCYTES # BLD: 0.05 10*3/MM3 (ref 0–0.06)
IMM GRANULOCYTES NFR BLD: 0.5 % (ref 0–0.6)
LYMPHOCYTES # BLD AUTO: 2.14 10*3/MM3 (ref 0.6–3.4)
LYMPHOCYTES NFR BLD AUTO: 21.7 % (ref 10–50)
MCH RBC QN AUTO: 30.6 PG (ref 27–31)
MCHC RBC AUTO-ENTMCNC: 32.5 G/DL (ref 30–37)
MCV RBC AUTO: 94.4 FL (ref 81–99)
MONOCYTES # BLD AUTO: 0.82 10*3/MM3 (ref 0–0.9)
MONOCYTES NFR BLD AUTO: 8.3 % (ref 0–12)
NEUTROPHILS # BLD AUTO: 6.79 10*3/MM3 (ref 2–6.9)
NEUTROPHILS NFR BLD AUTO: 68.9 % (ref 37–80)
NRBC BLD MANUAL-RTO: 0 /100 WBC (ref 0–0)
PLATELET # BLD AUTO: 235 10*3/MM3 (ref 130–400)
PMV BLD AUTO: 11.5 FL (ref 6–12)
POTASSIUM BLD-SCNC: 5.1 MMOL/L (ref 3.5–5.1)
PROT SERPL-MCNC: 7.1 G/DL (ref 6.3–8.2)
RBC # BLD AUTO: 5.32 10*6/MM3 (ref 4.2–5.4)
SODIUM BLD-SCNC: 140 MMOL/L (ref 137–145)
WBC NRBC COR # BLD: 9.86 10*3/MM3 (ref 4.8–10.8)
WHOLE BLOOD HOLD SPECIMEN: NORMAL
WHOLE BLOOD HOLD SPECIMEN: NORMAL

## 2017-07-19 PROCEDURE — 99284 EMERGENCY DEPT VISIT MOD MDM: CPT

## 2017-07-19 PROCEDURE — 96374 THER/PROPH/DIAG INJ IV PUSH: CPT

## 2017-07-19 PROCEDURE — 96361 HYDRATE IV INFUSION ADD-ON: CPT

## 2017-07-19 PROCEDURE — 93005 ELECTROCARDIOGRAM TRACING: CPT | Performed by: STUDENT IN AN ORGANIZED HEALTH CARE EDUCATION/TRAINING PROGRAM

## 2017-07-19 PROCEDURE — 25010000002 DIPHENHYDRAMINE PER 50 MG: Performed by: STUDENT IN AN ORGANIZED HEALTH CARE EDUCATION/TRAINING PROGRAM

## 2017-07-19 PROCEDURE — 25010000002 ONDANSETRON PER 1 MG: Performed by: STUDENT IN AN ORGANIZED HEALTH CARE EDUCATION/TRAINING PROGRAM

## 2017-07-19 PROCEDURE — 96375 TX/PRO/DX INJ NEW DRUG ADDON: CPT

## 2017-07-19 PROCEDURE — 73502 X-RAY EXAM HIP UNI 2-3 VIEWS: CPT

## 2017-07-19 PROCEDURE — 25010000002 MORPHINE PER 10 MG: Performed by: STUDENT IN AN ORGANIZED HEALTH CARE EDUCATION/TRAINING PROGRAM

## 2017-07-19 PROCEDURE — 80053 COMPREHEN METABOLIC PANEL: CPT | Performed by: STUDENT IN AN ORGANIZED HEALTH CARE EDUCATION/TRAINING PROGRAM

## 2017-07-19 PROCEDURE — 85025 COMPLETE CBC W/AUTO DIFF WBC: CPT | Performed by: STUDENT IN AN ORGANIZED HEALTH CARE EDUCATION/TRAINING PROGRAM

## 2017-07-19 RX ORDER — MORPHINE SULFATE 2 MG/ML
2 INJECTION, SOLUTION INTRAMUSCULAR; INTRAVENOUS ONCE
Status: COMPLETED | OUTPATIENT
Start: 2017-07-19 | End: 2017-07-19

## 2017-07-19 RX ORDER — DIPHENHYDRAMINE HYDROCHLORIDE 50 MG/ML
25 INJECTION INTRAMUSCULAR; INTRAVENOUS ONCE
Status: COMPLETED | OUTPATIENT
Start: 2017-07-19 | End: 2017-07-19

## 2017-07-19 RX ORDER — ONDANSETRON 2 MG/ML
4 INJECTION INTRAMUSCULAR; INTRAVENOUS ONCE
Status: COMPLETED | OUTPATIENT
Start: 2017-07-19 | End: 2017-07-19

## 2017-07-19 RX ORDER — SODIUM CHLORIDE 0.9 % (FLUSH) 0.9 %
10 SYRINGE (ML) INJECTION AS NEEDED
Status: DISCONTINUED | OUTPATIENT
Start: 2017-07-19 | End: 2017-07-19 | Stop reason: HOSPADM

## 2017-07-19 RX ADMIN — DIPHENHYDRAMINE HYDROCHLORIDE 25 MG: 50 INJECTION, SOLUTION INTRAMUSCULAR; INTRAVENOUS at 07:45

## 2017-07-19 RX ADMIN — SODIUM CHLORIDE 1000 ML: 9 INJECTION, SOLUTION INTRAVENOUS at 09:17

## 2017-07-19 RX ADMIN — ONDANSETRON 4 MG: 2 INJECTION INTRAMUSCULAR; INTRAVENOUS at 07:44

## 2017-07-19 RX ADMIN — MORPHINE SULFATE 2 MG: 2 INJECTION, SOLUTION INTRAMUSCULAR; INTRAVENOUS at 07:46

## 2017-07-19 NOTE — PROGRESS NOTES
Continued Stay Note  [facility]     Patient Name: Maria Dolores Campa  MRN: [id number]  Today's Date: 7/19/2017    Admit Date: 7/19/2017          Discharge Plan       07/19/17 1038    Case Management/Social Work Plan    Plan ALEXANDRA consult for resources, discussed options for further care of patient to include, medicaid waiver, LTC, [facility]  center. Children live out of town.  is primary caregiver. Private sitter list provided.     Patient/Family In Agreement With Plan yes    Final Note    Final Note Following for social and discharge planning.               Discharge Codes     None            Alicia Thompson

## 2017-07-19 NOTE — ED PROVIDER NOTES
Subjective   HPI Comments: 77-year-old female who presents with progressively worsening confusion as well as a fall earlier today because she lost her balance.  Patient reports that she lost her balance, but she did not blackout or strike her head.  She complains of left hip pain is worse with movement, nothing makes better, described as an ache.      Review of Systems   All other systems reviewed and are negative.      Past Medical History:   Diagnosis Date   • COPD (chronic obstructive pulmonary disease)    • MS (multiple sclerosis)        No Known Allergies    Past Surgical History:   Procedure Laterality Date   • CHOLECYSTECTOMY     • EYE SURGERY      Corneal implant       Family History   Problem Relation Age of Onset   • Family history unknown: Yes       Social History     Social History   • Marital status:      Spouse name: N/A   • Number of children: N/A   • Years of education: N/A     Social History Main Topics   • Smoking status: Current Every Day Smoker     Packs/day: 0.50     Types: Cigarettes   • Smokeless tobacco: None   • Alcohol use No   • Drug use: No   • Sexual activity: Defer     Other Topics Concern   • None     Social History Narrative           Objective   Physical Exam   Nursing note and vitals reviewed.    GEN: No acute distress  Head: Normocephalic, atraumatic  Eyes: Pupils equal round reactive to light  ENT: Posterior pharynx normal in appearance, oral mucosa is moist  Chest: Nontender to palpation  Cardiovascular: Regular rate  Lungs: Clear to auscultation bilaterally  Abdomen: Soft, nontender, nondistended, no peritoneal signs  Extremities: No pain with internal/external rotation of the left hip.  There is pain with palpation over the greater trochanter of the left femur.  No ecchymosis or deformity.  Neuro: GCS 15  Psych: Mood and affect are appropriate    Procedures         ED Course  ED Course   Comment By Time   Patient refused CT scan of brain Fernando Geiger MD 07/19 7310    According to patient's  the superior and inferior pubic rami fractures on the left that are questionable or possibly old - he states happened approximately one year ago. Fernando Geiger MD 07/19 8762                  MDM  Number of Diagnoses or Management Options  Fall, initial encounter:   Left hip pain:   Diagnosis management comments: Patient refused head CT.  Pelvis x-ray shows a left inferior and superior pubic rami fractures with questions whether these are old or new.  Patient's  states that she did this approximately one year ago.   was consulted to help with the patient.       Amount and/or Complexity of Data Reviewed  Clinical lab tests: ordered and reviewed        Final diagnoses:   Fall, initial encounter   Left hip pain            Fernando Geiger MD  07/19/17 0047

## 2017-08-24 ENCOUNTER — TELEPHONE (OUTPATIENT)
Dept: URGENT CARE | Facility: CLINIC | Age: 77
End: 2017-08-24

## 2017-08-24 DIAGNOSIS — N30.01 ACUTE CYSTITIS WITH HEMATURIA: Primary | ICD-10-CM

## 2017-08-24 RX ORDER — CIPROFLOXACIN 500 MG/1
500 TABLET, FILM COATED ORAL EVERY 12 HOURS
Qty: 14 TABLET | Refills: 0 | Status: SHIPPED | OUTPATIENT
Start: 2017-08-24 | End: 2017-08-31

## 2017-08-24 NOTE — TELEPHONE ENCOUNTER
called for results, reports macrobid is not taking care of the symptoms.  Consult with Catrina BURR and he will look at chart/culture and send in another Rx to patients pharmacy. Informed patient of change of med and discussed follow up.

## 2017-08-25 ENCOUNTER — TELEPHONE (OUTPATIENT)
Dept: URGENT CARE | Facility: CLINIC | Age: 77
End: 2017-08-25

## 2017-08-25 NOTE — TELEPHONE ENCOUNTER
----- Message from Rosangela Malone MD sent at 8/25/2017  7:54 AM EDT -----  Urine cx grew bacteria sensitive to cipro, which was prescribed

## 2018-12-20 ENCOUNTER — HOSPITAL ENCOUNTER (EMERGENCY)
Facility: HOSPITAL | Age: 78
Discharge: HOME | End: 2018-12-20
Attending: STUDENT IN AN ORGANIZED HEALTH CARE EDUCATION/TRAINING PROGRAM | Admitting: STUDENT IN AN ORGANIZED HEALTH CARE EDUCATION/TRAINING PROGRAM

## 2018-12-20 ENCOUNTER — OFFICE VISIT (OUTPATIENT)
Dept: ORTHOPEDIC SURGERY | Facility: CLINIC | Age: 78
End: 2018-12-20

## 2018-12-20 ENCOUNTER — APPOINTMENT (OUTPATIENT)
Dept: GENERAL RADIOLOGY | Facility: HOSPITAL | Age: 78
End: 2018-12-20

## 2018-12-20 VITALS — RESPIRATION RATE: 18 BRPM | BODY MASS INDEX: 16.67 KG/M2 | WEIGHT: 110 LBS | HEIGHT: 68 IN

## 2018-12-20 VITALS
RESPIRATION RATE: 18 BRPM | TEMPERATURE: 97.6 F | OXYGEN SATURATION: 97 % | BODY MASS INDEX: 16.73 KG/M2 | SYSTOLIC BLOOD PRESSURE: 144 MMHG | WEIGHT: 110 LBS | DIASTOLIC BLOOD PRESSURE: 94 MMHG | HEART RATE: 63 BPM

## 2018-12-20 DIAGNOSIS — S42.202A CLOSED FRACTURE OF PROXIMAL END OF LEFT HUMERUS, UNSPECIFIED FRACTURE MORPHOLOGY, INITIAL ENCOUNTER: Primary | ICD-10-CM

## 2018-12-20 DIAGNOSIS — S42.295A OTHER CLOSED NONDISPLACED FRACTURE OF PROXIMAL END OF LEFT HUMERUS, INITIAL ENCOUNTER: Primary | ICD-10-CM
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
## 2018-12-20 PROCEDURE — 99283 EMERGENCY DEPT VISIT LOW MDM: CPT

## 2018-12-20 PROCEDURE — 96374 THER/PROPH/DIAG INJ IV PUSH: CPT

## 2018-12-20 PROCEDURE — 25010000002 ONDANSETRON PER 1 MG: Performed by: STUDENT IN AN ORGANIZED HEALTH CARE EDUCATION/TRAINING PROGRAM

## 2018-12-20 PROCEDURE — 96375 TX/PRO/DX INJ NEW DRUG ADDON: CPT

## 2018-12-20 PROCEDURE — 99203 OFFICE O/P NEW LOW 30 MIN: CPT | Performed by: PHYSICIAN ASSISTANT

## 2018-12-20 PROCEDURE — 25010000002 MORPHINE PER 10 MG: Performed by: STUDENT IN AN ORGANIZED HEALTH CARE EDUCATION/TRAINING PROGRAM

## 2018-12-20 PROCEDURE — 73030 X-RAY EXAM OF SHOULDER: CPT

## 2018-12-20 RX ORDER — MORPHINE SULFATE 4 MG/ML
4 INJECTION, SOLUTION INTRAMUSCULAR; INTRAVENOUS ONCE
Status: COMPLETED | OUTPATIENT
Start: 2018-12-20 | End: 2018-12-20

## 2018-12-20 RX ORDER — ONDANSETRON 2 MG/ML
4 INJECTION INTRAMUSCULAR; INTRAVENOUS ONCE
Status: COMPLETED | OUTPATIENT
Start: 2018-12-20 | End: 2018-12-20

## 2018-12-20 RX ORDER — OXYCODONE HYDROCHLORIDE AND ACETAMINOPHEN 5; 325 MG/1; MG/1
1 TABLET ORAL EVERY 4 HOURS PRN
Qty: 16 TABLET | Refills: 0 | Status: SHIPPED | OUTPATIENT
Start: 2018-12-20 | End: 2018-12-25 | Stop reason: SDUPTHER

## 2018-12-20 RX ADMIN — MORPHINE SULFATE 4 MG: 4 INJECTION INTRAVENOUS at 03:09

## 2018-12-20 RX ADMIN — ONDANSETRON 4 MG: 2 INJECTION INTRAMUSCULAR; INTRAVENOUS at 03:12

## 2018-12-20 NOTE — DISCHARGE INSTRUCTIONS
You can remove the sling to shower.    Please do not use your hydrocodone while taking the Percocet.

## 2018-12-20 NOTE — ED PROVIDER NOTES
Subjective   Patient is a 78-year-old female who presents with pain in her left arm after having a mechanical fall just prior to arrival and falling and striking her elbow on the toilet seat jamming her left shoulder upward.  She states her pain is a 4 or 5 out of 10, constant aching and much worse with movement of the arm.  She states it is better since EMS has set her up with a makeshift splint.            Review of Systems   All other systems reviewed and are negative.      Past Medical History:   Diagnosis Date   • COPD (chronic obstructive pulmonary disease) (CMS/[facility])    • MS (multiple sclerosis) (CMS/[facility])        No Known Allergies    Past Surgical History:   Procedure Laterality Date   • CHOLECYSTECTOMY     • EYE SURGERY      Corneal implant       Family History   Family history unknown: Yes       Social History     Socioeconomic History   • Marital status:      Spouse name: Not on file   • Number of children: Not on file   • Years of education: Not on file   • Highest education level: Not on file   Tobacco Use   • Smoking status: Current Every Day Smoker     Packs/day: 0.50     Types: Cigarettes   Substance and Sexual Activity   • Alcohol use: No   • Drug use: No   • Sexual activity: Defer           Objective   Physical Exam   Nursing note and vitals reviewed.    GEN: No acute distress  Head: Normocephalic, atraumatic  Eyes: Pupils equal round reactive to light  ENT: Posterior pharynx normal in appearance, oral mucosa is moist  Chest: Nontender to palpation  Cardiovascular: Regular rate  Lungs: Clear to auscultation bilaterally  Abdomen: Soft, nontender, nondistended, no peritoneal signs  Extremities: Swelling over the anterior portion of the left shoulder.  Axillary nerve sensation is intact.  Patient can flex and extend at the elbow and the wrist.  Strong radial pulse.  Neuro: GCS 15  Psych: Mood and affect are appropriate    Procedures           ED Course                  MDM  Number of Diagnoses or  Management Options  Closed fracture of proximal end of left humerus, unspecified fracture morphology, initial encounter:   Diagnosis management comments: Patient has closed proximal humerus fracture on the left.  Will be set up with a sling.  Did discuss sling use.  Did give him a limited prescription of Percocet to help with pain.  Patient was given referral to orthopedics.       Amount and/or Complexity of Data Reviewed  Tests in the radiology section of CPT®: reviewed  Decide to obtain previous medical records or to obtain history from someone other than the patient: yes  Obtain history from someone other than the patient: yes  Review and summarize past medical records: yes  Independent visualization of images, tracings, or specimens: yes          Final diagnoses:   Closed fracture of proximal end of left humerus, unspecified fracture morphology, initial encounter            Fernando Geiger MD  12/20/18 0358

## 2018-12-20 NOTE — PROGRESS NOTES
Subjective   Patient ID: Maria Dolores Campa is a 78 y.o. right hand dominant female  Injury of the Left Shoulder         History of Present Illness    Patient presents with left shoulder injury that occurred 12-20-18, she suffered a mechanical fall landing on her left shoulder  In her bathroom.  She went to the emergency room had x-rays was told she had a proximal humerus fracture.  Patient denies elbow, forearm, wrist or hand pain.  Patient states the sling the emergency room placed on her left arm was uncomfortable so she removed this, she wrapped her arm in a dish towel and placed this around her neck- she states feels much better.    Past Medical History:   Diagnosis Date   • COPD (chronic obstructive pulmonary disease) (CMS/[facility])    • MS (multiple sclerosis) (CMS/[facility])         Past Surgical History:   Procedure Laterality Date   • CHOLECYSTECTOMY     • EYE SURGERY      Corneal implant       Family History   Family history unknown: Yes       Social History     Socioeconomic History   • Marital status:      Spouse name: Not on file   • Number of children: Not on file   • Years of education: Not on file   • Highest education level: Not on file   Social Needs   • Financial resource strain: Not on file   • Food insecurity - worry: Not on file   • Food insecurity - inability: Not on file   • Transportation needs - medical: Not on file   • Transportation needs - non-medical: Not on file   Occupational History   • Not on file   Tobacco Use   • Smoking status: Current Every Day Smoker     Packs/day: 0.50     Types: Cigarettes   Substance and Sexual Activity   • Alcohol use: No   • Drug use: No   • Sexual activity: Defer   Other Topics Concern   • Not on file   Social History Narrative   • Not on file         Current Outpatient Medications:   •  HYDROCODONE-ACETAMINOPHEN PO, Take  by mouth., Disp: , Rfl:   •  oxyCODONE-acetaminophen (PERCOCET) 5-325 MG per tablet, Take 1 tablet by mouth Every 4 (Four) Hours As  "Needed for Severe Pain ., Disp: 16 tablet, Rfl: 0  •  PARoxetine (PAXIL) 10 MG tablet, Take 10 mg by mouth Every Morning., Disp: , Rfl:   •  phenazopyridine (PYRIDIUM) 100 MG tablet, Take 1 tablet by mouth 3 (Three) Times a Day As Needed for bladder spasms., Disp: 6 tablet, Rfl: 0  •  propranolol (INDERAL) 40 MG tablet, Take 40 mg by mouth 3 (Three) Times a Day., Disp: , Rfl:   •  traMADol (ULTRAM) 50 MG tablet, Take 50 mg by mouth Every 6 (Six) Hours As Needed for Moderate Pain (4-6)., Disp: , Rfl:   No current facility-administered medications for this visit.     No Known Allergies    Review of Systems   Constitutional: Negative for fever.   HENT: Negative for voice change.    Eyes: Negative for visual disturbance.   Respiratory: Negative for shortness of breath.    Cardiovascular: Negative for chest pain.   Gastrointestinal: Negative for abdominal distention and abdominal pain.   Genitourinary: Negative for dysuria.   Musculoskeletal: Positive for arthralgias. Negative for gait problem and joint swelling.   Skin: Negative for rash.   Neurological: Negative for speech difficulty.   Hematological: Does not bruise/bleed easily.   Psychiatric/Behavioral: Negative for confusion.       Objective   Resp 18   Ht 172.7 cm (68\")   Wt 49.9 kg (110 lb)   BMI 16.73 kg/m²    Physical Exam  Ortho Exam     Neurologic Exam     Patient is NVI to LUE  NO ttp to the left elbow, left forearm , wrist or hand      Assessment/Plan   Independent Review of Radiographic Studies:    I did review x-ray imaging from [facility] of the left shoulder.  There is an acute comminuted left proximal humerus fracture no displacement.        Procedures       Maria Dolores was seen today for injury.    Diagnoses and all orders for this visit:    Other closed nondisplaced fracture of proximal end of left humerus, initial encounter    Fall, initial encounter       Orthopedic activities reviewed and patient expressed appreciation  Discussion of " orthopedic goals  Risk, benefits, and merits of treatment alternatives reviewed with the patient and questions answered  Reduced physical activity as appropriate  Ice, heat, and/or modalities as beneficial    Recommendations/Plan:  Exercise, medications, injections, other patient advice, and return appointment as noted.  Patient is encouraged to call or return for any issues or concerns.    We did place the patient in a large shoulder sling she was given a medium shoulder sling.  She is advised to continue using the sling and follow-up in 2 weeks x-ray on arrival  Patient agreeable to call or return sooner for any concerns.

## 2018-12-25 ENCOUNTER — APPOINTMENT (OUTPATIENT)
Dept: GENERAL RADIOLOGY | Facility: HOSPITAL | Age: 78
End: 2018-12-25
Attending: STUDENT IN AN ORGANIZED HEALTH CARE EDUCATION/TRAINING PROGRAM

## 2018-12-25 ENCOUNTER — HOSPITAL ENCOUNTER (EMERGENCY)
Facility: HOSPITAL | Age: 78
Discharge: HOME | End: 2018-12-25
Attending: STUDENT IN AN ORGANIZED HEALTH CARE EDUCATION/TRAINING PROGRAM | Admitting: STUDENT IN AN ORGANIZED HEALTH CARE EDUCATION/TRAINING PROGRAM

## 2018-12-25 VITALS
RESPIRATION RATE: 18 BRPM | BODY MASS INDEX: 16.67 KG/M2 | DIASTOLIC BLOOD PRESSURE: 62 MMHG | HEIGHT: 68 IN | OXYGEN SATURATION: 95 % | TEMPERATURE: 99.1 F | HEART RATE: 98 BPM | WEIGHT: 110 LBS | SYSTOLIC BLOOD PRESSURE: 119 MMHG

## 2018-12-25 DIAGNOSIS — S42.202A CLOSED FRACTURE OF PROXIMAL END OF LEFT HUMERUS, UNSPECIFIED FRACTURE MORPHOLOGY, INITIAL ENCOUNTER: Primary | ICD-10-CM

## 2018-12-25 DIAGNOSIS — N39.0 URINARY TRACT INFECTION WITHOUT HEMATURIA, SITE UNSPECIFIED: ICD-10-CM

## 2018-12-25 LAB
AMORPH URATE CRY URNS QL MICRO: ABNORMAL /HPF
BACTERIA UR QL AUTO: ABNORMAL /HPF
BILIRUB UR QL STRIP: NEGATIVE
CLARITY UR: ABNORMAL
COLOR UR: YELLOW
GLUCOSE UR STRIP-MCNC: NEGATIVE MG/DL
HGB UR QL STRIP.AUTO: ABNORMAL
HYALINE CASTS UR QL AUTO: ABNORMAL /LPF
KETONES UR QL STRIP: ABNORMAL
LEUKOCYTE ESTERASE UR QL STRIP.AUTO: ABNORMAL
NITRITE UR QL STRIP: POSITIVE
PH UR STRIP.AUTO: 7.5 [PH] (ref 5–8)
PROT UR QL STRIP: ABNORMAL
RBC # UR: ABNORMAL /HPF
REF LAB TEST METHOD: ABNORMAL
SP GR UR STRIP: 1.02 (ref 1–1.03)
SQUAMOUS #/AREA URNS HPF: ABNORMAL /HPF
UROBILINOGEN UR QL STRIP: ABNORMAL
WBC CLUMPS # UR AUTO: ABNORMAL /HPF
WBC UR QL AUTO: ABNORMAL /HPF

## 2018-12-25 PROCEDURE — 99284 EMERGENCY DEPT VISIT MOD MDM: CPT

## 2018-12-25 PROCEDURE — 96374 THER/PROPH/DIAG INJ IV PUSH: CPT

## 2018-12-25 PROCEDURE — 25010000002 MORPHINE PER 10 MG: Performed by: STUDENT IN AN ORGANIZED HEALTH CARE EDUCATION/TRAINING PROGRAM

## 2018-12-25 PROCEDURE — 87086 URINE CULTURE/COLONY COUNT: CPT | Performed by: STUDENT IN AN ORGANIZED HEALTH CARE EDUCATION/TRAINING PROGRAM

## 2018-12-25 PROCEDURE — 73030 X-RAY EXAM OF SHOULDER: CPT

## 2018-12-25 PROCEDURE — 87077 CULTURE AEROBIC IDENTIFY: CPT | Performed by: STUDENT IN AN ORGANIZED HEALTH CARE EDUCATION/TRAINING PROGRAM

## 2018-12-25 PROCEDURE — 87186 SC STD MICRODIL/AGAR DIL: CPT | Performed by: STUDENT IN AN ORGANIZED HEALTH CARE EDUCATION/TRAINING PROGRAM

## 2018-12-25 PROCEDURE — 81001 URINALYSIS AUTO W/SCOPE: CPT | Performed by: STUDENT IN AN ORGANIZED HEALTH CARE EDUCATION/TRAINING PROGRAM

## 2018-12-25 RX ORDER — CEPHALEXIN 250 MG/1
1000 CAPSULE ORAL ONCE
Status: COMPLETED | OUTPATIENT
Start: 2018-12-25 | End: 2018-12-25

## 2018-12-25 RX ORDER — CEPHALEXIN 500 MG/1
500 CAPSULE ORAL 4 TIMES DAILY
Qty: 28 CAPSULE | Refills: 0 | Status: SHIPPED | OUTPATIENT
Start: 2018-12-25 | End: 2019-01-09

## 2018-12-25 RX ORDER — OXYCODONE HYDROCHLORIDE AND ACETAMINOPHEN 5; 325 MG/1; MG/1
1 TABLET ORAL EVERY 4 HOURS PRN
Qty: 16 TABLET | Refills: 0 | Status: SHIPPED | OUTPATIENT
Start: 2018-12-25 | End: 2019-01-09

## 2018-12-25 RX ORDER — MORPHINE SULFATE 4 MG/ML
4 INJECTION, SOLUTION INTRAMUSCULAR; INTRAVENOUS ONCE
Status: COMPLETED | OUTPATIENT
Start: 2018-12-25 | End: 2018-12-25

## 2018-12-25 RX ADMIN — MORPHINE SULFATE 4 MG: 4 INJECTION INTRAVENOUS at 16:21

## 2018-12-25 RX ADMIN — CEPHALEXIN 1000 MG: 250 CAPSULE ORAL at 19:03

## 2018-12-28 LAB — BACTERIA SPEC AEROBE CULT: ABNORMAL

## 2019-01-02 DIAGNOSIS — S42.295A OTHER CLOSED NONDISPLACED FRACTURE OF PROXIMAL END OF LEFT HUMERUS, INITIAL ENCOUNTER: Primary | ICD-10-CM

## 2019-01-08 DIAGNOSIS — S42.295A OTHER CLOSED NONDISPLACED FRACTURE OF PROXIMAL END OF LEFT HUMERUS, INITIAL ENCOUNTER: Primary | ICD-10-CM

## 2019-01-09 ENCOUNTER — HOSPITAL ENCOUNTER (OUTPATIENT)
Facility: HOSPITAL | Age: 79
Setting detail: OBSERVATION
Discharge: HOME | End: 2019-01-11
Attending: EMERGENCY MEDICINE | Admitting: FAMILY MEDICINE

## 2019-01-09 ENCOUNTER — APPOINTMENT (OUTPATIENT)
Dept: GENERAL RADIOLOGY | Facility: HOSPITAL | Age: 79
End: 2019-01-09

## 2019-01-09 ENCOUNTER — APPOINTMENT (OUTPATIENT)
Dept: CT IMAGING | Facility: HOSPITAL | Age: 79
End: 2019-01-09

## 2019-01-09 DIAGNOSIS — Z74.09 IMPAIRED FUNCTIONAL MOBILITY, BALANCE, GAIT, AND ENDURANCE: ICD-10-CM

## 2019-01-09 DIAGNOSIS — R41.0 CONFUSION: ICD-10-CM

## 2019-01-09 DIAGNOSIS — R41.82 ALTERED MENTAL STATUS, UNSPECIFIED ALTERED MENTAL STATUS TYPE: Primary | ICD-10-CM

## 2019-01-09 DIAGNOSIS — Z74.09 IMPAIRED MOBILITY AND ADLS: ICD-10-CM

## 2019-01-09 DIAGNOSIS — Z78.9 IMPAIRED MOBILITY AND ADLS: ICD-10-CM

## 2019-01-09 DIAGNOSIS — R29.6 RECURRENT FALLS: ICD-10-CM

## 2019-01-09 PROBLEM — G35 MS (MULTIPLE SCLEROSIS): Status: ACTIVE | Noted: 2019-01-09

## 2019-01-09 PROBLEM — E86.0 DEHYDRATION: Status: ACTIVE | Noted: 2019-01-09

## 2019-01-09 PROBLEM — Z72.0 TOBACCO ABUSE: Status: ACTIVE | Noted: 2019-01-09

## 2019-01-09 PROBLEM — J44.9 COPD (CHRONIC OBSTRUCTIVE PULMONARY DISEASE) (HCC): Status: ACTIVE | Noted: 2019-01-09

## 2019-01-09 PROBLEM — S42.212S FX HUMERAL NECK, LEFT, SEQUELA: Status: ACTIVE | Noted: 2019-01-09

## 2019-01-09 PROBLEM — R53.81 DECLINING FUNCTIONAL STATUS: Status: ACTIVE | Noted: 2019-01-09

## 2019-01-09 LAB
ALBUMIN SERPL-MCNC: 4.35 G/DL (ref 3.2–4.8)
ALBUMIN/GLOB SERPL: 1.8 G/DL (ref 1.5–2.5)
ALP SERPL-CCNC: 173 U/L (ref 25–100)
ALT SERPL W P-5'-P-CCNC: 14 U/L (ref 7–40)
ANION GAP SERPL CALCULATED.3IONS-SCNC: 5 MMOL/L (ref 3–11)
AST SERPL-CCNC: 18 U/L (ref 0–33)
BACTERIA UR QL AUTO: NORMAL /HPF
BASOPHILS # BLD AUTO: 0.02 10*3/MM3 (ref 0–0.2)
BASOPHILS NFR BLD AUTO: 0.4 % (ref 0–1)
BILIRUB SERPL-MCNC: 0.4 MG/DL (ref 0.3–1.2)
BILIRUB UR QL STRIP: NEGATIVE
BUN BLD-MCNC: 21 MG/DL (ref 9–23)
BUN/CREAT SERPL: 26.9 (ref 7–25)
CALCIUM SPEC-SCNC: 10.1 MG/DL (ref 8.7–10.4)
CHLORIDE SERPL-SCNC: 98 MMOL/L (ref 99–109)
CLARITY UR: ABNORMAL
CO2 SERPL-SCNC: 33 MMOL/L (ref 20–31)
COLOR UR: YELLOW
CREAT BLD-MCNC: 0.78 MG/DL (ref 0.6–1.3)
DEPRECATED RDW RBC AUTO: 45.7 FL (ref 37–54)
EOSINOPHIL # BLD AUTO: 0.03 10*3/MM3 (ref 0–0.3)
EOSINOPHIL NFR BLD AUTO: 0.6 % (ref 0–3)
ERYTHROCYTE [DISTWIDTH] IN BLOOD BY AUTOMATED COUNT: 13.1 % (ref 11.3–14.5)
GFR SERPL CREATININE-BSD FRML MDRD: 71 ML/MIN/1.73
GLOBULIN UR ELPH-MCNC: 2.5 GM/DL
GLUCOSE BLD-MCNC: 112 MG/DL (ref 70–100)
GLUCOSE UR STRIP-MCNC: NEGATIVE MG/DL
HCT VFR BLD AUTO: 41.9 % (ref 34.5–44)
HGB BLD-MCNC: 13.2 G/DL (ref 11.5–15.5)
HGB UR QL STRIP.AUTO: NEGATIVE
HOLD SPECIMEN: NORMAL
HOLD SPECIMEN: NORMAL
HYALINE CASTS UR QL AUTO: NORMAL /LPF
IMM GRANULOCYTES # BLD AUTO: 0.01 10*3/MM3 (ref 0–0.03)
IMM GRANULOCYTES NFR BLD AUTO: 0.2 % (ref 0–0.6)
KETONES UR QL STRIP: ABNORMAL
LEUKOCYTE ESTERASE UR QL STRIP.AUTO: NEGATIVE
LYMPHOCYTES # BLD AUTO: 0.99 10*3/MM3 (ref 0.6–4.8)
LYMPHOCYTES NFR BLD AUTO: 18.6 % (ref 24–44)
MAGNESIUM SERPL-MCNC: 2 MG/DL (ref 1.3–2.7)
MCH RBC QN AUTO: 30.3 PG (ref 27–31)
MCHC RBC AUTO-ENTMCNC: 31.5 G/DL (ref 32–36)
MCV RBC AUTO: 96.3 FL (ref 80–99)
MONOCYTES # BLD AUTO: 0.31 10*3/MM3 (ref 0–1)
MONOCYTES NFR BLD AUTO: 5.8 % (ref 0–12)
NEUTROPHILS # BLD AUTO: 3.97 10*3/MM3 (ref 1.5–8.3)
NEUTROPHILS NFR BLD AUTO: 74.4 % (ref 41–71)
NITRITE UR QL STRIP: NEGATIVE
PH UR STRIP.AUTO: 7.5 [PH] (ref 5–8)
PLATELET # BLD AUTO: 379 10*3/MM3 (ref 150–450)
PMV BLD AUTO: 10.5 FL (ref 6–12)
POTASSIUM BLD-SCNC: 4.2 MMOL/L (ref 3.5–5.5)
PROT SERPL-MCNC: 6.8 G/DL (ref 5.7–8.2)
PROT UR QL STRIP: NEGATIVE
RBC # BLD AUTO: 4.35 10*6/MM3 (ref 3.89–5.14)
RBC # UR: NORMAL /HPF
REF LAB TEST METHOD: NORMAL
SODIUM BLD-SCNC: 136 MMOL/L (ref 132–146)
SP GR UR STRIP: 1.02 (ref 1–1.03)
SQUAMOUS #/AREA URNS HPF: NORMAL /HPF
TROPONIN I SERPL-MCNC: 0.02 NG/ML (ref 0–0.07)
UROBILINOGEN UR QL STRIP: ABNORMAL
WBC NRBC COR # BLD: 5.33 10*3/MM3 (ref 3.5–10.8)
WBC UR QL AUTO: NORMAL /HPF
WHOLE BLOOD HOLD SPECIMEN: NORMAL
WHOLE BLOOD HOLD SPECIMEN: NORMAL

## 2019-01-09 PROCEDURE — G0378 HOSPITAL OBSERVATION PER HR: HCPCS

## 2019-01-09 PROCEDURE — 93005 ELECTROCARDIOGRAM TRACING: CPT | Performed by: EMERGENCY MEDICINE

## 2019-01-09 PROCEDURE — 71045 X-RAY EXAM CHEST 1 VIEW: CPT

## 2019-01-09 PROCEDURE — 99220 PR INITIAL OBSERVATION CARE/DAY 70 MINUTES: CPT | Performed by: FAMILY MEDICINE

## 2019-01-09 PROCEDURE — 80053 COMPREHEN METABOLIC PANEL: CPT

## 2019-01-09 PROCEDURE — 93005 ELECTROCARDIOGRAM TRACING: CPT

## 2019-01-09 PROCEDURE — 81001 URINALYSIS AUTO W/SCOPE: CPT | Performed by: PHYSICIAN ASSISTANT

## 2019-01-09 PROCEDURE — P9612 CATHETERIZE FOR URINE SPEC: HCPCS

## 2019-01-09 PROCEDURE — 84484 ASSAY OF TROPONIN QUANT: CPT

## 2019-01-09 PROCEDURE — 99285 EMERGENCY DEPT VISIT HI MDM: CPT

## 2019-01-09 PROCEDURE — 83735 ASSAY OF MAGNESIUM: CPT

## 2019-01-09 PROCEDURE — 85025 COMPLETE CBC W/AUTO DIFF WBC: CPT

## 2019-01-09 PROCEDURE — 36415 COLL VENOUS BLD VENIPUNCTURE: CPT

## 2019-01-09 PROCEDURE — 96360 HYDRATION IV INFUSION INIT: CPT

## 2019-01-09 RX ORDER — HYDROCODONE BITARTRATE AND ACETAMINOPHEN 5; 325 MG/1; MG/1
1 TABLET ORAL EVERY 8 HOURS PRN
Status: DISCONTINUED | OUTPATIENT
Start: 2019-01-09 | End: 2019-01-11 | Stop reason: HOSPADM

## 2019-01-09 RX ORDER — SODIUM CHLORIDE 0.9 % (FLUSH) 0.9 %
3 SYRINGE (ML) INJECTION EVERY 12 HOURS SCHEDULED
Status: DISCONTINUED | OUTPATIENT
Start: 2019-01-09 | End: 2019-01-11 | Stop reason: HOSPADM

## 2019-01-09 RX ORDER — ACETAMINOPHEN 325 MG/1
650 TABLET ORAL EVERY 4 HOURS PRN
Status: DISCONTINUED | OUTPATIENT
Start: 2019-01-09 | End: 2019-01-11 | Stop reason: HOSPADM

## 2019-01-09 RX ORDER — HYDROCODONE BITARTRATE AND ACETAMINOPHEN 5; 325 MG/1; MG/1
1 TABLET ORAL 4 TIMES DAILY PRN
Status: ON HOLD | COMMUNITY
End: 2019-02-11 | Stop reason: SDUPTHER

## 2019-01-09 RX ORDER — IPRATROPIUM BROMIDE AND ALBUTEROL SULFATE 2.5; .5 MG/3ML; MG/3ML
3 SOLUTION RESPIRATORY (INHALATION) EVERY 6 HOURS PRN
Status: DISCONTINUED | OUTPATIENT
Start: 2019-01-09 | End: 2019-01-11 | Stop reason: HOSPADM

## 2019-01-09 RX ORDER — CARBAMAZEPINE 200 MG/1
200 TABLET ORAL 2 TIMES DAILY
Status: DISCONTINUED | OUTPATIENT
Start: 2019-01-09 | End: 2019-01-11 | Stop reason: HOSPADM

## 2019-01-09 RX ORDER — DIPHENHYDRAMINE HCL 25 MG
25 CAPSULE ORAL NIGHTLY PRN
Status: DISCONTINUED | OUTPATIENT
Start: 2019-01-09 | End: 2019-01-11 | Stop reason: HOSPADM

## 2019-01-09 RX ORDER — SODIUM CHLORIDE 0.9 % (FLUSH) 0.9 %
10 SYRINGE (ML) INJECTION AS NEEDED
Status: DISCONTINUED | OUTPATIENT
Start: 2019-01-09 | End: 2019-01-11 | Stop reason: HOSPADM

## 2019-01-09 RX ORDER — TRAMADOL HYDROCHLORIDE 50 MG/1
50 TABLET ORAL EVERY 6 HOURS PRN
Status: DISCONTINUED | OUTPATIENT
Start: 2019-01-09 | End: 2019-01-09

## 2019-01-09 RX ORDER — PAROXETINE HYDROCHLORIDE 20 MG/1
10 TABLET, FILM COATED ORAL DAILY
Status: DISCONTINUED | OUTPATIENT
Start: 2019-01-10 | End: 2019-01-11 | Stop reason: HOSPADM

## 2019-01-09 RX ORDER — SODIUM CHLORIDE 9 MG/ML
75 INJECTION, SOLUTION INTRAVENOUS CONTINUOUS
Status: DISCONTINUED | OUTPATIENT
Start: 2019-01-09 | End: 2019-01-10

## 2019-01-09 RX ORDER — CARBAMAZEPINE 200 MG/1
200 TABLET ORAL 2 TIMES DAILY
COMMUNITY

## 2019-01-09 RX ORDER — DIPHENHYDRAMINE HCL 50 MG
50 CAPSULE ORAL NIGHTLY PRN
COMMUNITY
End: 2019-01-23

## 2019-01-09 RX ORDER — SODIUM CHLORIDE 0.9 % (FLUSH) 0.9 %
3-10 SYRINGE (ML) INJECTION AS NEEDED
Status: DISCONTINUED | OUTPATIENT
Start: 2019-01-09 | End: 2019-01-11 | Stop reason: HOSPADM

## 2019-01-09 RX ORDER — PROPRANOLOL HYDROCHLORIDE 20 MG/1
40 TABLET ORAL 3 TIMES DAILY
Status: DISCONTINUED | OUTPATIENT
Start: 2019-01-09 | End: 2019-01-11 | Stop reason: HOSPADM

## 2019-01-09 RX ORDER — SENNA AND DOCUSATE SODIUM 50; 8.6 MG/1; MG/1
2 TABLET, FILM COATED ORAL NIGHTLY
COMMUNITY

## 2019-01-09 RX ADMIN — HYDROCODONE BITARTRATE AND ACETAMINOPHEN 1 TABLET: 5; 325 TABLET ORAL at 22:46

## 2019-01-09 RX ADMIN — SODIUM CHLORIDE, PRESERVATIVE FREE 3 ML: 5 INJECTION INTRAVENOUS at 22:45

## 2019-01-09 RX ADMIN — SODIUM CHLORIDE 75 ML/HR: 9 INJECTION, SOLUTION INTRAVENOUS at 22:45

## 2019-01-09 RX ADMIN — PROPRANOLOL HYDROCHLORIDE 40 MG: 20 TABLET ORAL at 22:45

## 2019-01-09 RX ADMIN — CARBAMAZEPINE 200 MG: 200 TABLET ORAL at 22:46

## 2019-01-09 NOTE — ED PROVIDER NOTES
"Subjective   78-year-old female presents to the emergency department with complaints of increasing generalized weakness and confusion.  The family states that over the past couple of weeks, she has had a markedly worsening of her confusion and has been generally weak.  She had a fall 2 weeks ago that resulted in a left humeral neck fracture.  She did not hit her head at that time.  She has had a recent urinary tract infection treated with Keflex but did not complete the medication.  Last night, the patient was going to the bathroom and when she stood up she had a syncopal episode.  No injury from the syncope.  The patient has not been able to ambulate without a walker and significant assistance for several weeks.  The patient denies any current complaints other than pain associated with her recent humerus fracture.  She has had no fever.  She has had some nausea and occasional dry heaves.  No abdominal pain.  No urinary symptoms.    Past medical history: MS and COPD.  Surgical history: Cholecystectomy and cataract surgery.  Her PCP is Gregorio Jackson.            Review of Systems   Constitutional: Positive for appetite change and fatigue. Negative for chills and fever.   HENT: Negative for nosebleeds.    Eyes: Negative for visual disturbance.   Respiratory: Negative for cough and shortness of breath.    Cardiovascular: Negative for chest pain and palpitations.   Gastrointestinal: Positive for nausea and vomiting (\"dry heaves\"). Negative for abdominal pain and diarrhea.   Endocrine: Negative for polydipsia, polyphagia and polyuria.   Genitourinary: Negative for dysuria.   Musculoskeletal: Negative for back pain and neck pain.        Left upper arm pain secondary to recent left humerus fx.   Skin: Negative for wound.   Allergic/Immunologic: Negative for immunocompromised state.   Neurological: Positive for weakness (generalized). Negative for headaches.   Hematological: Negative.    Psychiatric/Behavioral: Positive for " confusion.       Past Medical History:   Diagnosis Date   • COPD (chronic obstructive pulmonary disease) (CMS/HCC)    • MS (multiple sclerosis) (CMS/[facility])        No Known Allergies    Past Surgical History:   Procedure Laterality Date   • CHOLECYSTECTOMY     • EYE SURGERY      Corneal implant       Family History   Family history unknown: Yes       Social History     Socioeconomic History   • Marital status:      Spouse name: Not on file   • Number of children: Not on file   • Years of education: Not on file   • Highest education level: Not on file   Tobacco Use   • Smoking status: Current Every Day Smoker     Packs/day: 0.50     Types: Cigarettes   • Smokeless tobacco: Never Used   Substance and Sexual Activity   • Alcohol use: No   • Drug use: No   • Sexual activity: Defer           Objective   Physical Exam   Constitutional: She appears well-developed and well-nourished. She does not appear ill.   HENT:   Head: Normocephalic.   Nose: Nose normal.   Mouth/Throat: Oropharynx is clear and moist.   Eyes: Conjunctivae and EOM are normal. Pupils are equal, round, and reactive to light.   Neck: Normal range of motion. Neck supple.   Cardiovascular: Normal rate, regular rhythm, normal heart sounds and intact distal pulses.   Pulmonary/Chest: Effort normal and breath sounds normal.   Abdominal: Soft. Bowel sounds are normal. There is no tenderness.   Musculoskeletal: She exhibits no edema.   Neurological: She is alert.   Nml speech.  Nml facial symmetry.  Moves all extremities (left arm limited due to being in a sling with recent left humerus fx.).  Mild confusion.  She was aware of the current month but wasn't sure about the year.  She knew who the President was.      Skin: Skin is warm and dry.   Psychiatric: She has a normal mood and affect.       Procedures           ED Course      7:25 PM  The pt appears in no distress.  She is alert and fairly well oriented.  No concerning labs.  Nml UA.  Negative chest xray.   "I had ordered a CT scan of the head, given her recent fall, but she refused it, stating she didn't hit her head.  Her family is exasperated and her spouse states he \"is at his ropes end\".  He apparently has been sleeping on the floor outside her room as she gets up several times through the night and he is concerned she will fall again, especially since she has had the recent left humerus fracture.  The grandson, who is a nurse here, states he has noticed a huge change in her mentation in recent weeks.  The family wants her admitted and is hopeful neurology can see her while she is admitted.  I will talk with the hospitalist about admission.              MDM      Final diagnoses:   Altered mental status, unspecified altered mental status type   Confusion   Recurrent falls            Alvaro Alvarenga, PA  01/09/19 1925    "

## 2019-01-10 PROCEDURE — 97110 THERAPEUTIC EXERCISES: CPT

## 2019-01-10 PROCEDURE — G0378 HOSPITAL OBSERVATION PER HR: HCPCS

## 2019-01-10 PROCEDURE — 99226 PR SBSQ OBSERVATION CARE/DAY 35 MINUTES: CPT | Performed by: INTERNAL MEDICINE

## 2019-01-10 PROCEDURE — 96361 HYDRATE IV INFUSION ADD-ON: CPT

## 2019-01-10 PROCEDURE — 97165 OT EVAL LOW COMPLEX 30 MIN: CPT

## 2019-01-10 PROCEDURE — 97162 PT EVAL MOD COMPLEX 30 MIN: CPT

## 2019-01-10 RX ORDER — NICOTINE 21 MG/24HR
1 PATCH, TRANSDERMAL 24 HOURS TRANSDERMAL EVERY 24 HOURS
Status: DISCONTINUED | OUTPATIENT
Start: 2019-01-10 | End: 2019-01-11 | Stop reason: HOSPADM

## 2019-01-10 RX ADMIN — HYDROCODONE BITARTRATE AND ACETAMINOPHEN 1 TABLET: 5; 325 TABLET ORAL at 20:43

## 2019-01-10 RX ADMIN — NICOTINE 1 PATCH: 21 PATCH, EXTENDED RELEASE TRANSDERMAL at 21:36

## 2019-01-10 RX ADMIN — PAROXETINE HYDROCHLORIDE 10 MG: 20 TABLET, FILM COATED ORAL at 09:15

## 2019-01-10 RX ADMIN — CARBAMAZEPINE 200 MG: 200 TABLET ORAL at 20:43

## 2019-01-10 RX ADMIN — HYDROCODONE BITARTRATE AND ACETAMINOPHEN 1 TABLET: 5; 325 TABLET ORAL at 09:17

## 2019-01-10 RX ADMIN — PROPRANOLOL HYDROCHLORIDE 40 MG: 20 TABLET ORAL at 16:59

## 2019-01-10 RX ADMIN — ACETAMINOPHEN 650 MG: 325 TABLET ORAL at 14:09

## 2019-01-10 RX ADMIN — PROPRANOLOL HYDROCHLORIDE 40 MG: 20 TABLET ORAL at 09:15

## 2019-01-10 RX ADMIN — SODIUM CHLORIDE, PRESERVATIVE FREE 3 ML: 5 INJECTION INTRAVENOUS at 20:43

## 2019-01-10 RX ADMIN — CARBAMAZEPINE 200 MG: 200 TABLET ORAL at 09:15

## 2019-01-10 NOTE — PLAN OF CARE
Problem: Patient Care Overview  Goal: Plan of Care Review  Outcome: Ongoing (interventions implemented as appropriate)   01/10/19 0958   Coping/Psychosocial   Plan of Care Reviewed With patient   Plan of Care Review   Progress improving   OTHER   Outcome Summary Pt presents with decreased strength, impaired functional mobility and decreased activity tolerance compared to baseline. Pt performed bed mobility tasks with CGA with LUE in sling d/t prior L humeral neck fx, requiring increased time for completion. Pt performed STS and bed-chair transfer minAx2 with HH assist of RUE. Pt c/o weakness upon standing and was very ataxic and unsteady, so further amb was deferred. Recommend d/c to SNF at this time.  PT eval and treatment performed under direct supervision and assist of SAMRA

## 2019-01-10 NOTE — PROGRESS NOTES
[facility] Medicine Services  PROGRESS NOTE    Patient Name: Maria Dolores Campa  : 1940  MRN: [id number]    Date of Admission: 2019  Length of Stay: 0  Primary Care Physician: Gregorio Jackson MD    Subjective   Subjective     CC:  AMS    HPI:  Pt doing well this am, sitting up in bed.     Review of Systems  Gen- No fevers, chills  CV- No chest pain, palpitations  Resp- No cough, dyspnea  GI- No N/V/D, abd pain    Otherwise ROS is negative except as mentioned in the HPI.    Objective   Objective     Vital Signs:   Temp:  [97.4 °F (36.3 °C)-98.4 °F (36.9 °C)] 97.8 °F (36.6 °C)  Heart Rate:  [] 60  Resp:  [16-18] 16  BP: (101-139)/(47-86) 106/57        Physical Exam:  Constitutional: No acute distress, awake, alert, nontoxic, normal body habitus  Respiratory: Clear to auscultation bilaterally, good effort, nonlabored respirations   Cardiovascular: RRR, no murmur  Gastrointestinal: Positive bowel sounds, soft, nontender, nondistended  Musculoskeletal: No peripheral edema, normal muscle tone for age. LUE in gravity sling.   Psychiatric: Appropriate affect, poor insight and judgement, cooperative  Neurologic: Confused at times, but oriented to time and place, movements symmetricRUE and BLE, Cranial Nerves grossly intact to confrontation, speech clear and fluent.  LUE in gravity sling.  Skin: No rashes, no jaundice, no petechiae, no mottling          Results Reviewed:  I have personally reviewed current lab, radiology, and data and agree.    Results from last 7 days   Lab Units  19   1509   WBC 10*3/mm3  5.33   HEMOGLOBIN g/dL  13.2   HEMATOCRIT %  41.9   PLATELETS 10*3/mm3  379     Results from last 7 days   Lab Units  19   1514  19   1509   SODIUM mmol/L   --   136   POTASSIUM mmol/L   --   4.2   CHLORIDE mmol/L   --   98*   CO2 mmol/L   --   33.0*   BUN mg/dL   --   21   CREATININE mg/dL   --   0.78   GLUCOSE mg/dL   --   112*   CALCIUM mg/dL   --    10.1   ALT (SGPT) U/L   --   14   AST (SGOT) U/L   --   18   TROPONIN I ng/mL  0.02   --      Estimated Creatinine Clearance: 44.8 mL/min (by C-G formula based on SCr of 0.78 mg/dL).    No results found for: BNP    Microbiology Results Abnormal     None          Imaging Results (last 24 hours)     Procedure Component Value Units Date/Time    XR Chest 1 View [[id number]] Collected:  01/09/19 1637     Updated:  01/09/19 1644    Narrative:       EXAMINATION: XR CHEST 1 VW-01/09/2019:      INDICATION: Weak/Dizzy/AMS, triage protocol.      COMPARISON: NONE.     FINDINGS: The cardiac silhouette is normal. There is an elevated left  diaphragm. There is no pulmonary inflammatory process, mass or effusion.  There is thoracolumbar scoliosis. There is a comminuted fracture of the  left humeral neck.           Impression:       Chronic appearing pulmonary findings. There is comminuted  fracture of the neck of the left humerus.     D:  01/09/2019  E:  01/09/2019     This report was finalized on 1/9/2019 4:42 PM by Dr. Eldon Hemphill MD.                  I have reviewed the medications:    Current Facility-Administered Medications:   •  acetaminophen (TYLENOL) tablet 650 mg, 650 mg, Oral, Q4H PRN, Hosea Avitia PA-C  •  carBAMazepine (TEGretol) tablet 200 mg, 200 mg, Oral, BID, Hosea Avitia PA-C, 200 mg at 01/10/19 0915  •  diphenhydrAMINE (BENADRYL) capsule 25 mg, 25 mg, Oral, Nightly PRN, Hosea Avitia PA-C  •  HYDROcodone-acetaminophen (NORCO) 5-325 MG per tablet 1 tablet, 1 tablet, Oral, Q8H PRN, Steffany Swain MD, 1 tablet at 01/10/19 0917  •  ipratropium-albuterol (DUO-NEB) nebulizer solution 3 mL, 3 mL, Nebulization, Q6H PRN, Hosea Avitia PA-C  •  PARoxetine (PAXIL) tablet 10 mg, 10 mg, Oral, Daily, Hosea Avitia PA-C, 10 mg at 01/10/19 0915  •  propranolol (INDERAL) tablet 40 mg, 40 mg, Oral, TID, Hosea Avitia PA-C, 40 mg at 01/10/19 0915  •  sodium chloride 0.9 % flush 10 mL, 10 mL,  "Intravenous, PRN, Ulysses Fernandez MD  •  sodium chloride 0.9 % flush 3 mL, 3 mL, Intravenous, Q12H, Hosea Avitia PA-C, 3 mL at 01/09/19 2245  •  sodium chloride 0.9 % flush 3-10 mL, 3-10 mL, Intravenous, PRN, Hosea Avitia PA-C      Assessment/Plan   Assessment / Plan     Active Hospital Problems    Diagnosis Date Noted   • **Altered mental status [R41.82] 01/09/2019   • MS (multiple sclerosis) (CMS/[facility]) [G35] 01/09/2019   • Tobacco abuse [Z72.0] 01/09/2019   • COPD (chronic obstructive pulmonary disease) (CMS/[facility]) [J44.9] 01/09/2019   • Fx humeral neck, left, sequela [S42.212S] 01/09/2019   • Declining functional status [R53.81] 01/09/2019   • Frequent falls [R29.6] 01/09/2019   • Dehydration [E86.0] 01/09/2019   • Moderate malnutrition (CMS/[facility]) [E44.0] 04/01/2017          Brief Hospital Course to date:  Maria Dolores Campa is a 78 y.o. female is a 78 y.o. female with PMHx of ongoing tobacco use, COPD, MS, and probable undiagnosed underlying dementia brought to ED by family for general recent functional decline. Pt recently fell and broke her left humerus and had outpatient f/u with Ortho arranged, also treated for UTI during that ED evaluation. Pt has since fallen several times at home.     Plan:    1. Altered Mental Status:  - clinically seems very likely undiagnosed dementia with associated functional debility/decline due to recent series of events with UTI/fall etc  - patient refused CT head  - PT/OT treat and eval  - consider neurology consult in the setting of MS and no previous \"formal\" diagnosis of dementia  - Case management; will likely need at least short term rehab or placement     2. COPD:  - stable. Not on supplemental oxygen  - history of long time tobacco abuse  - nicotine patch. Duo nebs with additional pulmonary toilet as needed     3. Recent Left Humeral Fracture:  - secondary to mechanical fall  - has recently followed up with ortho at [facility]. Sling/immbolization " recommended. She was to follow up ortho today but presented to ED instead. Ensure ortho follow up at d/c   - continue with plan as above  - pain control as needed           DVT Prophylaxis:  mechanical    Disposition: I expect the patient to be discharged TBD.    CODE STATUS:   Code Status and Medical Interventions:   Ordered at: 01/09/19 1951     Level Of Support Discussed With:    Patient     Code Status:    CPR     Medical Interventions (Level of Support Prior to Arrest):    Full         Electronically signed by Charmaine John MD, 01/10/19, 10:17 AM.

## 2019-01-10 NOTE — CONSULTS
"                  Clinical Nutrition     Nutrition Assessment  Reason for Visit:   Identified at risk by screening criteria    Patient Name: Maria Dolores Campa  YOB: 1940  MRN: [id number]  Date of Encounter: 01/10/19 12:59 PM  Admission date: 1/9/2019    Nutrition Assessment     Hospital Problem List    Altered mental status    Moderate malnutrition (CMS/HCC)    MS (multiple sclerosis) (CMS/HCC)    Tobacco abuse    COPD (chronic obstructive pulmonary disease) (CMS/HCC)    Fx humeral neck, left, sequela    Declining functional status    Frequent falls    Dehydration    PMH: She  has a past medical history of COPD (chronic obstructive pulmonary disease) (CMS/HCC) and MS (multiple sclerosis) (CMS/HCC).   PSxH: She  has a past surgical history that includes Cholecystectomy and Eye surgery.     Other Nutrition Related Factors:  Muscle and fat loss noted at physical exam, not recent weight change or decreased PO intake per family. RD unable to determine nutrition severity at this time.      Reported/Observed/Food/Nutrition Related History:   Grandson at beside and reports no recent weight changes.  Not significant change in appetite.  Normally eats one big meal a day, has been drinking Boost supplement.    Nutrition Focused Physical Exam completed, muscle and fat loss noted, however its not clear if this the patient baseline vs due to recent decline.  Family unable to determine that as well.      Patient confused at time of visit. Lunch tray at bedside, asking for assistance with feeding. Communicated to RN and assistance provided.  Agreed to Boost supplement with meals     Anthropometrics     Height: 162.6 cm (64\")  Last filed wt: Weight: 49 kg (108 lb) (01/10/19 0602)  Weight Method: Bed scale    BMI: BMI (Calculated): 18.5  Normal: 18.5-24.9kg/m2    Ideal Body Weight (IBW) (kg): 55    Weight Change :   No recent weight changes per family      Labs reviewed     Results from last 7 days   Lab Units  " 01/09/19   1509   SODIUM mmol/L  136   POTASSIUM mmol/L  4.2   CHLORIDE mmol/L  98*   CO2 mmol/L  33.0*   BUN mg/dL  21   CREATININE mg/dL  0.78   CALCIUM mg/dL  10.1   BILIRUBIN mg/dL  0.4   ALK PHOS U/L  173*   ALT (SGPT) U/L  14   AST (SGOT) U/L  18   GLUCOSE mg/dL  112*           No results found for: HGBA1C    Medications reviewed   Pertinent:  Reviewed         Nutrition Focused Physical Exam Findings     Subcutaneous fat:  Orbital Severe   Triceps/Biceps Severe   Thoracic and lumbar region- ribs lower back, midaxillary line Unable to determine at this time     Muscle:  Temple/temporalis Severe   Clavicle- pectoralis, deltoid, trapezius Severe   Clavicle and acromion process  Severe   Scapular bone region Severe   Dorsal hand Severe   Quadriceps Moderate   Calf Severe     Per RD physical exam on 1/10.  Since no reported recent weight loss, ? If this patient baseline.     Current Nutrition Prescription     PO: Diet Regular  No active supplement orders    Intake: 50% x 1 meal     Nutrition Diagnosis   1/10/2019  Problem Predicted suboptimal energy intake   Etiology Mental status    Signs/Symptoms intermittent confusion      Nutrition Intervention     Interventions Goal    General: Nutrition support treatment    Nutrition Interventions   1.  Follow treatment progress, Care plan reviewed, Interview for preferences, Supplement provided   Start Boost + BID     Monitor/ Evaluation    Per protocol, PO intake, Supplement intake, Pertinent labs      Will Continue to follow per protocol      Antonietta Hsieh RD  Time Spent: 45min

## 2019-01-10 NOTE — THERAPY EVALUATION
Acute Care - Physical Therapy Initial Evaluation  [facility]     Patient Name: Maria Dolores Campa  : 1940  MRN: [id number]  Today's Date: 1/10/2019   Onset of Illness/Injury or Date of Surgery: (P) 19  Date of Referral to PT: (P) 19  Referring Physician: KYLIE Avitia (P)      Admit Date: 2019    Visit Dx:     ICD-10-CM ICD-9-CM   1. Altered mental status, unspecified altered mental status type R41.82 780.97   2. Confusion R41.0 298.9   3. Recurrent falls R29.6 V15.88   4. Impaired functional mobility, balance, gait, and endurance Z74.09 V49.89     Patient Active Problem List   Diagnosis   • Urinary tract infection without hematuria   • Acute exacerbation of chronic obstructive pulmonary disease (COPD) (CMS/HCC)   • Moderate malnutrition (CMS/HCC)   • Acute metabolic encephalopathy   • Altered mental status   • MS (multiple sclerosis) (CMS/HCC)   • Tobacco abuse   • COPD (chronic obstructive pulmonary disease) (CMS/HCC)   • Fx humeral neck, left, sequela   • Declining functional status   • Frequent falls   • Dehydration     Past Medical History:   Diagnosis Date   • COPD (chronic obstructive pulmonary disease) (CMS/HCC)    • MS (multiple sclerosis) (CMS/HCC)      Past Surgical History:   Procedure Laterality Date   • CHOLECYSTECTOMY     • EYE SURGERY      Corneal implant        PT ASSESSMENT (last 12 hours)      Physical Therapy Evaluation     Row Name 01/10/19 0958          PT Evaluation Time/Intention    Subjective Information  no complaints  (Pended)   -JR     Document Type  evaluation  (Pended)   -JR     Mode of Treatment  physical therapy  (Pended)   -JR     Patient Effort  good  (Pended)   -JR     Symptoms Noted During/After Treatment  other (see comments)  (Pended)  weakness  -JR     Row Name 01/10/19 1590          General Information    Patient Profile Reviewed?  yes  (Pended)   -JR     Onset of Illness/Injury or Date of Surgery  19  (Pended)   -JR     Referring Physician   KYLIE Avitia  (Pended)   -JR     Patient Observations  alert;cooperative;agree to therapy  (Pended)   -JR     Patient/Family Observations  No family present.  (Pended)   -JR     General Observations of Patient  Pt supine in bed, exit alarm, no tele, IV intact.  (Pended)   -JR     Prior Level of Function  independent:;all household mobility;gait;transfer;bed mobility;ADL's;dependent:;using stairs  (Pended)  stair lift  -JR     Equipment Currently Used at Home  bath bench;rollator;shoulder immobilizer;lift device  (Pended)  2 shoulder slings, uses homemade sling;2 stair lifts in home  -JR     Pertinent History of Current Functional Problem  Pt is a 79 yo female with MS and COPD admitted for increased generalized weakness and AMS. Pt fell 2 weeks ago and sustained L humeral neck fx. Night prior to admit, had syncopal episode upon standing from bed. Has not been able to amb without RW and significant assistance for several weeks. Has fallen 1 other time since sustaining humeral neck fx.  (Pended)   -JR     Existing Precautions/Restrictions  fall  (Pended)   -JR     Limitations/Impairments  safety/cognitive  (Pended)   -JR     Risks Reviewed  patient:;LOB;nausea/vomiting;dizziness;increased discomfort;lines disloged  (Pended)   -JR     Benefits Reviewed  patient:;improve function;increase independence;increase strength;increase balance  (Pended)   -JR     Barriers to Rehab  previous functional deficit  (Pended)   -     Row Name 01/10/19 0958          Relationship/Environment    Primary Source of Support/Comfort  spouse;extended family  (Pended)   -JR     Lives With  spouse  (Pended)   -JR     Family Caregiver if Needed  spouse  (Pended)   -     Row Name 01/10/19 0958          Resource/Environmental Concerns    Current Living Arrangements  home/apartment/condo  (Pended)   -JR     Resource/Environmental Concerns  none  (Pended)   -     Row Name 01/10/19 0958          Home Main Entrance    Number of Stairs, Main  Entrance  ten  (Pended)  1 flight from basement entrance, 1 flight to 2nd level  -     Stairs Comment, Main Entrance  2 stair lifts within home  (Pended)   -     Row Name 01/10/19 0958          Stairs Within Home, Primary    Stairs Comment, Within Home, Primary  2 Stair lifts within home  (Pended)   -     Row Name 01/10/19 0958          Cognitive Assessment/Interventions    Additional Documentation  Cognitive Assessment/Intervention (Group)  (Pended)   -     Row Name 01/10/19 0958          Cognitive Assessment/Intervention- PT/OT    Affect/Mental Status (Cognitive)  WFL  (Pended)   -     Orientation Status (Cognition)  oriented to;person;place;situation  (Pended)  Stated January but did not know the year  -     Follows Commands (Cognition)  follows one step commands;over 90% accuracy  (Pended)   -     Cognitive Function (Cognitive)  safety deficit  (Pended)   -     Safety Deficit (Cognitive)  mild deficit;awareness of need for assistance;insight into deficits/self awareness;safety precautions awareness  (Pended)   -     Personal Safety Interventions  fall prevention program maintained  (Pended)   -     Row Name 01/10/19 0958          Safety Issues, Functional Mobility    Safety Issues Affecting Function (Mobility)  awareness of need for assistance;insight into deficits/self awareness;safety precaution awareness  (Pended)   -     Impairments Affecting Function (Mobility)  balance;coordination;endurance/activity tolerance;strength  (Pended)   -     Row Name 01/10/19 0958          Bed Mobility Assessment/Treatment    Bed Mobility Assessment/Treatment  scooting/bridging;supine-sit  (Pended)   -     Rolling Left [place] (Bed Mobility)  --  (Pended)   -     Scooting/Bridging [place] (Bed Mobility)  contact guard;verbal cues  (Pended)   -     Supine-Sit [place] (Bed Mobility)  contact guard;verbal cues  (Pended)   -     Bed Mobility, Safety Issues  decreased use of arms for  pushing/pulling;decreased use of legs for bridging/pushing  (Pended)  LUE in sling  -     Assistive Device (Bed Mobility)  bed rails;head of bed elevated  (Pended)   -     Comment (Bed Mobility)  VCs for HP, sequencing. Increased time needed to complete tasks secondary to LUE in sling  (Pended)   -JR     Row Name 01/10/19 0958          Transfer Assessment/Treatment    Transfer Assessment/Treatment  sit-stand transfer;stand-sit transfer;bed-chair transfer  (Pended)   -     Bed-Chair [place] (Transfers)  minimum assist (75% patient effort);verbal cues;2 person assist  (Pended)   -     Assistive Device (Bed-Chair Transfers)  other (see comments)  (Pended)  HH assist of RUE  -JR     Sit-Stand [place] (Transfers)  minimum assist (75% patient effort);verbal cues  (Pended)   -JR     Stand-Sit [place] (Transfers)  minimum assist (75% patient effort);verbal cues  (Pended)   -     Row Name 01/10/19 0958          Sit-Stand Transfer    Assistive Device (Sit-Stand Transfers)  other (see comments)  (Pended)  HH assist of RUE  -JR     Row Name 01/10/19 0958          Stand-Sit Transfer    Assistive Device (Stand-Sit Transfers)  other (see comments)  (Pended)  HH assist of RUE  -JR     Row Name 01/10/19 0958          Gait/Stairs Assessment/Training    Gait/Stairs Assessment/Training  gait/ambulation independence;gait/ambulation assistive device;distance ambulated;gait pattern;gait deviations  (Pended)   -     [place] Level (Gait)  minimum assist (75% patient effort);2 person assist;verbal cues  (Pended)   -     Assistive Device (Gait)  other (see comments)  (Pended)  HH assist of E  -     Distance in Feet (Gait)  5  (Pended)  bed-chair transfer  -     Pattern (Gait)  step-to  (Pended)   -     Deviations/Abnormal Patterns (Gait)  bilateral deviations;ataxic;senthil decreased;stride length decreased  (Pended)   -JR     Bilateral Gait Deviations  forward flexed posture;heel strike decreased   (Pended)   -     Comment (Gait/Stairs)  Pt c/o feeling weak. Very ataxic and unsteady.   (Pended)   -     Row Name 01/10/19 0958          General ROM    GENERAL ROM COMMENTS  BLE WFL  (Pended)   -     Row Name 01/10/19 0958          MMT (Manual Muscle Testing)    General MMT Comments  BL hip flex 3+/5, knee ext 5/5, ankle DF 5/5  (Pended)   -     Row Name 01/10/19 0958          Motor Assessment/Intervention    Additional Documentation  Balance (Group);Balance Interventions (Group);Therapeutic Exercise (Group);Therapeutic Exercise Interventions (Group);Gross Motor Coordination (Group)  (Pended)   -     Row Name 01/10/19 0958          Therapeutic Exercise    80809 - PT Therapeutic Exercise Minutes  8  (Pended)   -[facility] Name 01/10/19 0958          Therapeutic Exercise    Lower Extremity (Therapeutic Exercise)  heel slides, bilateral;LAQ (long arc quad), bilateral;marching while seated  (Pended)   -     Lower Extremity Range of Motion (Therapeutic Exercise)  hip abduction/adduction, bilateral;ankle dorsiflexion/plantar flexion, bilateral  (Pended)   -     Exercise Type (Therapeutic Exercise)  AROM (active range of motion)  (Pended)   -     Position (Therapeutic Exercise)  seated  (Pended)   -     Sets/Reps (Therapeutic Exercise)  1/10  (Pended)   -     Comment (Therapeutic Exercise)  rests b/w sets  (Pended)   -     Row Name 01/10/19 0958          Gross Motor Coordination    Gross Motor Skill, Impairments Detail  Negative heel-to-shin BL  (Pended)   -[facility] Name 01/10/19 0958          Balance    Balance  static sitting balance;static standing balance;dynamic sitting balance  (Pended)   -[facility] Name 01/10/19 0958          Static Sitting Balance    Level of [place] (Unsupported Sitting, Static Balance)  supervision  (Pended)   -     Sitting Position (Unsupported Sitting, Static Balance)  sitting on edge of bed  (Pended)   -     Time Able to Maintain Position (Unsupported  Sitting, Static Balance)  1 to 2 minutes  (Pended)   -     Row Name 01/10/19 0958          Dynamic Sitting Balance    Level of [place], Reaches Outside Midline (Sitting, Dynamic Balance)  contact guard assist  (Pended)   -JR     Sitting Position, Reaches Outside Midline (Sitting, Dynamic Balance)  sitting on edge of bed  (Pended)   -JR     Comment, Reaches Outside Midline (Sitting, Dynamic Balance)  scooting to EOB  (Pended)   -     Row Name 01/10/19 0958          Static Standing Balance    Level of [place] (Supported Standing, Static Balance)  minimal assist, 75% patient effort  (Pended)   -     Time Able to Maintain Position (Supported Standing, Static Balance)  1 to 2 minutes  (Pended)   -     Assistive Device Utilized (Supported Standing, Static Balance)  other (see comments)  (Pended)  HH assist of RUE  -     Row Name 01/10/19 0958          Pain Assessment    Additional Documentation  Pain Scale: Numbers Pre/Post-Treatment (Group)  (Pended)   -     Row Name 01/10/19 0958          Pain Scale: Numbers Pre/Post-Treatment    Pain Scale: Numbers, Pretreatment  0/10 - no pain  (Pended)   -     Pain Scale: Numbers, Post-Treatment  0/10 - no pain  (Pended)   -     Row Name 01/10/19 0958          Plan of Care Review    Plan of Care Reviewed With  patient  (Pended)   -     Row Name 01/10/19 0958          Physical Therapy Clinical Impression    Date of Referral to PT  01/09/19  (Pended)   -     PT Diagnosis (PT Clinical Impression)  impaired functional mobility  (Pended)   -     Patient/Family Goals Statement (PT Clinical Impression)  Did not state goal  (Pended)   -JR     Criteria for Skilled Interventions Met (PT Clinical Impression)  yes;treatment indicated  (Pended)   -     Pathology/Pathophysiology Noted (Describe Specifically for Each System)  neuromuscular  (Pended)   -JR     Impairments Found (describe specific impairments)  gait, locomotion, and balance  (Pended)   -JR     Care  Plan Review (PT)  evaluation/treatment results reviewed;patient/other agree to care plan  (Pended)   -     Row Name 01/10/19 0958          Vital Signs    Pre Systolic BP Rehab  --  (Pended)  no tele, RN cleared for PT  -JR     Pre Patient Position  Supine  (Pended)   -JR     Intra Patient Position  Standing  (Pended)   -JR     Post Patient Position  Sitting  (Pended)   -     Row Name 01/10/19 0958          Physical Therapy Goals    Bed Mobility Goal Selection (PT)  bed mobility, PT goal 1  (Pended)   -JR     Transfer Goal Selection (PT)  transfer, PT goal 1  (Pended)   -JR     Gait Training Goal Selection (PT)  gait training, PT goal 1  (Pended)   -     Row Name 01/10/19 0958          Bed Mobility Goal 1 (PT)    Activity/Assistive Device (Bed Mobility Goal 1, PT)  bed mobility activities, all  (Pended)   -JR     [place] Level/Cues Needed (Bed Mobility Goal 1, PT)  conditional independence  (Pended)   -JR     Time Frame (Bed Mobility Goal 1, PT)  long term goal (LTG);1 week  (Pended)   -     Row Name 01/10/19 0958          Transfer Goal 1 (PT)    Activity/Assistive Device (Transfer Goal 1, PT)  sit-to-stand/stand-to-sit;bed-to-chair/chair-to-bed;walker, linda  (Pended)  or least restrictive AD  -JR     [place] Level/Cues Needed (Transfer Goal 1, PT)  supervision required  (Pended)   -JR     Time Frame (Transfer Goal 1, PT)  long term goal (LTG);1 week  (Pended)   -     Row Name 01/10/19 0958          Gait Training Goal 1 (PT)    Activity/Assistive Device (Gait Training Goal 1, PT)  gait (walking locomotion);assistive device use;walker, linda  (Pended)  or least restrictive AD  -JR     [place] Level (Gait Training Goal 1, PT)  contact guard assist  (Pended)   -JR     Distance (Gait Goal 1, PT)  100  (Pended)   -JR     Time Frame (Gait Training Goal 1, PT)  long term goal (LTG);1 week  (Pended)   -     Row Name 01/10/19 0958          Positioning and Restraints    Pre-Treatment Position  in bed   (Pended)   -     Post Treatment Position  chair  (Pended)   -     In Chair  notified nsg;reclined;call light within reach;encouraged to call for assist;exit alarm on;legs elevated  (Pended)   -     Row Name 01/10/19 0958          Living Environment    Home Accessibility  stairs to enter home;stairs within home  (Pended)   -       User Key  (r) = Recorded By, (t) = Taken By, (c) = Cosigned By    Initials Name Provider Type     Bonnie Fenton, PT Student PT Student        Physical Therapy Education     Title: PT OT SLP Therapies (Done)     Topic: Physical Therapy (Done)     Point: Mobility training (Done)     Learning Progress Summary           Patient Acceptance, E, NR,VU by  at 1/10/2019 11:04 AM    Comment:  Pt educated on POC, importance of OOB activity and safe functional mobility.                   Point: Home exercise program (Done)     Learning Progress Summary           Patient Acceptance, E, NR,VU by  at 1/10/2019 11:04 AM    Comment:  Pt educated on POC, importance of OOB activity and safe functional mobility.                   Point: Body mechanics (Done)     Learning Progress Summary           Patient Acceptance, E, NR,VU by  at 1/10/2019 11:04 AM    Comment:  Pt educated on POC, importance of OOB activity and safe functional mobility.                   Point: Precautions (Done)     Learning Progress Summary           Patient Acceptance, E, NR,VU by  at 1/10/2019 11:04 AM    Comment:  Pt educated on POC, importance of OOB activity and safe functional mobility.                               User Key     Initials Effective Dates Name Provider Type [facility] 12/06/18 -  Bonnie Fenton, PT Student PT Student PT              PT Recommendation and Plan  Anticipated Discharge Disposition (PT): (P) skilled nursing facility  Planned Therapy Interventions (PT Eval): (P) balance training, bed mobility training, gait training, strengthening, transfer training  Therapy Frequency (PT Clinical  Impression): (P) daily  Outcome Summary/Treatment Plan (PT)  Anticipated Discharge Disposition (PT): (P) skilled nursing facility  Plan of Care Reviewed With: (P) patient  Progress: (P) improving  Outcome Summary: (P) Pt presents with decreased strength, impaired functional mobility and decreased activity tolerance compared to baseline. Pt performed bed mobility tasks with CGA with LUE in sling d/t prior L humeral neck fx, requiring increased time for completion. Pt performed STS and bed-chair transfer with HH assist of RUE with minAx2. Pt c/o weakness upon standing and was very ataxic and unsteady, so further amb was deferred. Recommend d/c to SNF at this time.  Outcome Measures     Row Name 01/10/19 1100             How much help from another person do you currently need...    Turning from your back to your side while in flat bed without using bedrails?  3  (Pended)   -JR      Moving from lying on back to sitting on the side of a flat bed without bedrails?  3  (Pended)   -JR      Moving to and from a bed to a chair (including a wheelchair)?  3  (Pended)   -JR      Standing up from a chair using your arms (e.g., wheelchair, bedside chair)?  3  (Pended)   -JR      Climbing 3-5 steps with a railing?  1  (Pended)   -JR      To walk in hospital room?  3  (Pended)   -JR      AM-PAC 6 Clicks Score  16  (Pended)   -         Functional Assessment    Outcome Measure Options  AM-PAC 6 Clicks Basic Mobility (PT)  (Pended)   -JR        User Key  (r) = Recorded By, (t) = Taken By, (c) = Cosigned By    Initials Name Provider Type    Bonnie Serrano, PT Student PT Student         Time Calculation:   PT Charges     Row Name 01/10/19 0958             Time Calculation    Start Time  0958  (Pended)   -JR      PT Received On  01/10/19  (Pended)   -      PT Goal Re-Cert Due Date  01/20/19  (Pended)   -JR         Time Calculation- PT    Total Timed Code Minutes- PT  8 minute(s)  (Pended)   -JR         Timed Charges    88163 - PT  Therapeutic Exercise Minutes  8  (Pended)   -        User Key  (r) = Recorded By, (t) = Taken By, (c) = Cosigned By    Initials Name Provider Type    Bonnie Serrano, PT Student PT Student        Therapy Suggested Charges     Code   Minutes Charges    77061 (CPT®) Hc Pt Neuromusc Re Education Ea 15 Min      53354 (CPT®) Hc Pt Ther Proc Ea 15 Min 8 1    94691 (CPT®) Hc Gait Training Ea 15 Min      71533 (CPT®) Hc Pt Therapeutic Act Ea 15 Min      41774 (CPT®) Hc Pt Manual Therapy Ea 15 Min      74756 (CPT®) Hc Pt Iontophoresis Ea 15 Min      93459 (CPT®) Hc Pt Elec Stim Ea-Per 15 Min      26206 (CPT®) Hc Pt Ultrasound Ea 15 Min      58562 (CPT®) Hc Pt Self Care/Mgmt/Train Ea 15 Min      90100 (CPT®) Hc Pt Prosthetic (S) Train Initial Encounter, Each 15 Min      55801 (CPT®) Hc Pt Orthotic(S)/Prosthetic(S) Encounter, Each 15 Min      16354 (CPT®) Hc Orthotic(S) Mgmt/Train Initial Encounter, Each 15min      Total  8 1        Therapy Charges for Today     Code Description Service Date Service Provider Modifiers Qty    10648401817 HC PT THER PROC EA 15 MIN 1/10/2019 Bonnie Fenton, PT Student GP 1    75045180768 HC PT EVAL MOD COMPLEXITY 4 1/10/2019 Bonnie Fenton, PT Student GP 1          PT G-Codes  Outcome Measure Options: (P) AM-PAC 6 Clicks Basic Mobility (PT)  AM-PAC 6 Clicks Score: (P) 16      Bonnie Fenton, PT Student  1/10/2019

## 2019-01-10 NOTE — PROGRESS NOTES
Discharge Planning Assessment  [facility]     Patient Name: Maria Dolores Campa  MRN: [id number]  Today's Date: 1/10/2019    Admit Date: 1/9/2019    Discharge Needs Assessment     Row Name 01/10/19 1448       Living Environment    Lives With  spouse    Name(s) of Who Lives With Patient  Spouse is Drew     Current Living Arrangements  home/apartment/condo 2 level home    Primary Care Provided by  self    Provides Primary Care For  no one    Family Caregiver if Needed  spouse    Quality of Family Relationships  unable to assess patient alone in room    Able to Return to Prior Arrangements  yes       Resource/Environmental Concerns    Resource/Environmental Concerns  none    Transportation Concerns  car, none       Transition Planning    Patient/Family Anticipates Transition to  home with family Patient tells me she prefers home at discharge.     Patient/Family Anticipated Services at Transition  home health care;rehabilitation services    Transportation Anticipated  family or friend will provide       Discharge Needs Assessment    Readmission Within the Last 30 Days  no previous admission in last 30 days    Equipment Currently Used at Home  sling;rollator;bath bench    Anticipated Changes Related to Illness  none    Equipment Needed After Discharge  none        Discharge Plan     Row Name 01/10/19 5313       Plan    Plan  Patient tells me home with     Patient/Family in Agreement with Plan  yes    Plan Comments  I spoke with patient this afternoon. Family not present. She tells me if rehab is needed would prefer going to outpatient [facility] rehab in [place].     16:00 Patient's spouse in room. He tells me he plans to take her home tomorrow and planning to hires caregivers in the home. I gave him list of sitter services.     We also discussed rehab in home or outpatient. He plans to discuss with his daughter and they will let me know tomorrow.         Final Discharge Disposition Code  01 - home or self-care         Destination      No service coordination in this encounter.      Durable Medical Equipment      No service coordination in this encounter.      Dialysis/Infusion      No service coordination in this encounter.      Home Medical Care      No service coordination in this encounter.      Community Resources      No service coordination in this encounter.          Demographic Summary     Row Name 01/10/19 1444       General Information    Admission Type  observation    Referral Source  admission list;physician    Preferred Language  English    General Information Comments  PCP is Gregorio Jackson       Contact Information    Permission Granted to Share Info With      Contact Information Obtained for      Contact Information Comments  [phone]        Functional Status     Row Name 01/10/19 1446       Functional Status    Usual Activity Tolerance  good    Current Activity Tolerance  moderate       Functional Status, IADL    Medications  independent    Meal Preparation  assistive person  usually cooks    Housekeeping  assistive person has a cleaning lady    Laundry  independent    Shopping  independent       Employment/    Employment/ Comments  Has Medicare, in observation         Psychosocial    No documentation.       Abuse/Neglect    No documentation.       Legal    No documentation.       Substance Abuse    No documentation.       Patient Forms    No documentation.           Allie Swanson RN

## 2019-01-10 NOTE — H&P
[facility] Medicine Services  HISTORY AND PHYSICAL    Patient Name: Maria Dolores Campa  : 1940  MRN: [id number]  Primary Care Physician: Gregorio Jackson MD  Date of admission: 2019      Subjective   Subjective     Chief Complaint:  AMS    HPI:  Maria Dolores Campa is a 78 y.o. female with a past medical history significant for COPD and MS who presents with acute change in mental status. HPI limited secondary to patient disposition. Family at [facility] suspect early stages of dementia as short term memory and orientation has progressively declined over the past few weeks. Acutely so over so over the past 2-3 days. Grandson is a nurse here at [facility]. He volunteers that patient has sustained multiple falls over the past month and is increasingly difficult for her  to care for. Patient's  has been sleeping outside the bedroom on the floor as patient wakes up frequently in the night and is a high fall risk. She was seen 18 at [facility] and was noted to have sustained a left humeral neck fracture. Has been in a sling since then, but is largely non compliant. Patient  was then seen two weeks later with acute confusion and UTI for which she was prescribed Keflex. Apparently however did not complete full course. Since fracture, patient has fallen twice. She, herself endorses decreased PO intake in terms of fluid. States she is afraid to drink as she fears falling to get to the bathroom. She is typically ambulatory per walker but has required more assistance as of late. Family is hopeful of rehab placement and possible evaluation per neurology. Currently without complaints of dysuria, cough, congestion, fever, chest pain, or SOB. Will admit for further evaluation and treatment.    Emergency Department Evaluation; vitals stable. Chemistry and hematology favorable. UA negative. CXR without acute changes. EKG normal sinus.    Review of Systems    Constitutional: Negative for chills and fever.   HENT: Negative for congestion and trouble swallowing.    Eyes: Negative for photophobia and visual disturbance.   Respiratory: Negative for cough and shortness of breath.    Cardiovascular: Negative for chest pain and leg swelling.   Gastrointestinal: Negative for abdominal pain, diarrhea, nausea and vomiting.   Endocrine: Negative for cold intolerance and heat intolerance.   Genitourinary: Negative for dysuria and flank pain.   Musculoskeletal: Positive for arthralgias and gait problem. Negative for back pain and joint swelling.   Skin: Negative for pallor and rash.   Allergic/Immunologic: Negative for immunocompromised state.   Neurological: Positive for weakness. Negative for dizziness and headaches.   Hematological: Negative for adenopathy.   Psychiatric/Behavioral: Positive for confusion. Negative for agitation.          Otherwise 10-system ROS reviewed and is negative except as mentioned in the HPI.    Personal History     Past Medical History:   Diagnosis Date   • COPD (chronic obstructive pulmonary disease) (CMS/HCC)    • MS (multiple sclerosis) (CMS/[facility])        Past Surgical History:   Procedure Laterality Date   • CHOLECYSTECTOMY     • EYE SURGERY      Corneal implant       Family History: Family history is unknown by patient. Otherwise pertinent FHx was reviewed and unremarkable.     Social History:  reports that she has been smoking cigarettes.  She has been smoking about 0.50 packs per day. she has never used smokeless tobacco. She reports that she does not drink alcohol or use drugs.  Social History     Social History Narrative   • Not on file       Medications:    Available home medication information reviewed.    (Not in a hospital admission)    No Known Allergies    Objective   Objective     Vital Signs:   Temp:  [97.4 °F (36.3 °C)-98.4 °F (36.9 °C)] 98.4 °F (36.9 °C)  Heart Rate:  [] 65  Resp:  [16-18] 16  BP: (103-128)/(58-86) 108/76         Physical Exam   Constitutional: No acute distress, awake, alert but pleasantly disoriented, underweight  Eyes: PERRLA, sclerae anicteric, no conjunctival injection  HENT: NCAT, mucous membranes moist  Neck: Supple, no thyromegaly, no lymphadenopathy, trachea midline  Respiratory: Clear to auscultation bilaterally, nonlabored respirations   Cardiovascular: RRR, no murmurs, rubs, or gallops, palpable pedal pulses bilaterally  Gastrointestinal: Positive bowel sounds, soft, nontender, nondistended  Musculoskeletal: No bilateral ankle edema, no clubbing or cyanosis to extremities  Left arm in sling. Decreased ROM at shoulder secondary to injury. Neurovascularly intact distal to fracture  Psychiatric: Appropriate affect, cooperative  Neurologic:strength symmetric in all extremities, Cranial Nerves grossly intact to confrontation, speech clear  Skin: No rashes      Results Reviewed:  I have personally reviewed current lab, radiology, and data and agree.    Results from last 7 days   Lab Units  01/09/19   1509   WBC 10*3/mm3  5.33   HEMOGLOBIN g/dL  13.2   HEMATOCRIT %  41.9   PLATELETS 10*3/mm3  379     Results from last 7 days   Lab Units  01/09/19   1514  01/09/19   1509   SODIUM mmol/L   --   136   POTASSIUM mmol/L   --   4.2   CHLORIDE mmol/L   --   98*   CO2 mmol/L   --   33.0*   BUN mg/dL   --   21   CREATININE mg/dL   --   0.78   GLUCOSE mg/dL   --   112*   CALCIUM mg/dL   --   10.1   ALT (SGPT) U/L   --   14   AST (SGOT) U/L   --   18   TROPONIN I ng/mL  0.02   --      Estimated Creatinine Clearance: 41.5 mL/min (by C-G formula based on SCr of 0.78 mg/dL).  Brief Urine Lab Results  (Last result in the past 365 days)      Color   Clarity   Blood   Leuk Est   Nitrite   Protein   CREAT   Urine HCG        01/09/19 1718 Yellow Cloudy Negative Negative Negative Negative             No results found for: BNP  Imaging Results (last 24 hours)     Procedure Component Value Units Date/Time    XR Chest 1 View [[id number]]  "Collected:  01/09/19 1637     Updated:  01/09/19 1644    Narrative:       EXAMINATION: XR CHEST 1 VW-01/09/2019:      INDICATION: Weak/Dizzy/AMS, triage protocol.      COMPARISON: NONE.     FINDINGS: The cardiac silhouette is normal. There is an elevated left  diaphragm. There is no pulmonary inflammatory process, mass or effusion.  There is thoracolumbar scoliosis. There is a comminuted fracture of the  left humeral neck.           Impression:       Chronic appearing pulmonary findings. There is comminuted  fracture of the neck of the left humerus.     D:  01/09/2019  E:  01/09/2019     This report was finalized on 1/9/2019 4:42 PM by Dr. Eldon Hemphill MD.                Assessment/Plan   Assessment / Plan     Active Hospital Problems    Diagnosis Date Noted   • **Altered mental status [R41.82] 01/09/2019     Priority: High   • Declining functional status [R53.81] 01/09/2019     Priority: High   • Frequent falls [R29.6] 01/09/2019     Priority: High   • Dehydration [E86.0] 01/09/2019     Priority: High   • COPD (chronic obstructive pulmonary disease) (CMS/HCC) [J44.9] 01/09/2019     Priority: Medium   • Fx humeral neck, left, sequela [S42.212S] 01/09/2019     Priority: Medium   • MS (multiple sclerosis) (CMS/[facility]) [G35] 01/09/2019     Priority: Low   • Tobacco abuse [Z72.0] 01/09/2019     Priority: Low   • Moderate malnutrition (CMS/[facility]) [E44.0] 04/01/2017     Priority: Low         1. Altered Mental Status:  - clinically seems very likely undiagnosed dementia with associated functional debility/decline due to recent series of events with UTI/fall etc  - patient refused CT head  - PT/OT treat and eval  - consider neurology consult in the setting of MS and no previous \"formal\" diagnosis of dementia  - Case management; will likely need at least short term rehab or placement      2. Dehydration:  - gentle hydration with normal saline 75 cc/hr x 12 hours  - nutrition assessment. Malnourished    3. COPD:  - stable. Not on " supplemental oxygen  - history of long time tobacco abuse  - nicotine patch. Duo nebs with additional pulmonary toilet as needed    4. Recent Left Humeral Fracture:  - secondary to mechanical fall  - has recently followed up with ortho at [facility]. Sling/immbolization recommended. She was to follow up ortho today but presented to ED instead. Ensure ortho follow up at d/c   - continue with plan as above  - pain control as needed      DVT prophylaxis: mechanical    CODE STATUS:  Full code, full support  Code Status and Medical Interventions:   Ordered at: 01/09/19 1951     Level Of Support Discussed With:    Patient     Code Status:    CPR     Medical Interventions (Level of Support Prior to Arrest):    Full       Admission Status:  I believe this patient meets OBSERVATION status, however if further evaluation or treatment plans warrant, status may change.  Based upon current information, I predict patient's care encounter to be less than or equal to 2 midnights.        Electronically signed by Hosea Avitia PA-C, 01/09/19, 7:53 PM.        Brief Attending Admission Attestation     I have seen and examined the patient, performing an independent face-to-face diagnostic evaluation with plan of care reviewed and developed with the advanced practice clinician (APC).      Brief Summary Statement/HPI:   Maria Dolores Campa is a 78 y.o. female with PMHx of ongoing tobacco use, COPD, MS, and proabble undiagnosed underlying dementia brought to ED by family for general recent functiona decline.  Patient had UTI at end of Dec and fell fracturing her left humerus, seen in [facility] ED and treated for UTI as well as had outpt Ortho follow up arranged.  Since that time, has fallen multiple times again at home due to impulsivity and family unable to kepp her safely mobile.   has been sleeping on floor outside patient's room to assure she doesn't wander at night.  Patient has been more confused in general.  In  Ed, there is no evidence of active infection (UA shows treatment of recent UTI) and other labs unremarkable.  Pt is very fiesty and ornery at times, she refused CT head.  She is talkative and seems convincing but family in room (grandson is 4G/H floor nurse at [employer] [facility] [employer]) indicate patient not answering accurately and her history cannot be relied upon although she seems convincing at times. Ambulatory with walker at baseline, left humerus fx complicating her mobility safety.  Plan for OBS admission for PT/OT/CM to [facility].       Attending Physical Exam:  Constitutional: No acute distress, awake, alert, nontoxic, normal body habitus  Respiratory: Clear to auscultation bilaterally, good effort, nonlabored respirations   Cardiovascular: RRR, no murmur  Gastrointestinal: Positive bowel sounds, soft, nontender, nondistended  Musculoskeletal: No peripheral edema, normal muscle tone for age. LUE in gravity sling.   Psychiatric: Appropriate affect, poor insight and judgement, cooperative  Neurologic: Confused but confabulates well, movements symmetricRUE and BLE, Cranial Nerves grossly intact to confrontation, speech clear and fluent.  LUE in gravity sling.  Skin: No rashes, no jaundice, no petechiae, no mottling      Assessment/Plan :  See above section for further detailed assessment and plan developed with APC which I have reviewed and/or edited.      Electronically signed by Steffany Swain MD, 01/09/19, 10:12 PM.

## 2019-01-10 NOTE — PLAN OF CARE
Problem: Patient Care Overview  Goal: Plan of Care Review  Outcome: Ongoing (interventions implemented as appropriate)   01/10/19 4150   Coping/Psychosocial   Plan of Care Reviewed With patient   Plan of Care Review   Progress improving   OTHER   Outcome Summary OT completed a brief chart review relating to presenting physical/cognitive deficits. Pt Min Ax2 for STS t/f, limited d/t c/o BLE weakness. Pt educated on appropriate sling fit/adjustment/purpose and to maintain LUE NWB status. Recommend cont skilled IPOT POC. Recommend pt DC to SNF.

## 2019-01-10 NOTE — PLAN OF CARE
Problem: Patient Care Overview  Goal: Plan of Care Review  Outcome: Ongoing (interventions implemented as appropriate)   01/10/19 7788   Coping/Psychosocial   Plan of Care Reviewed With patient;grandchild(stephon)   Plan of Care Review   Progress no change   OTHER   Outcome Summary VSS, pt has had intermittent confusion and short-term forgetfulness but alert and answers questions appropriately, pt has rested well since coming to the floor, pure wick in place, 1 c/o pain that was managed with po prn med, and will continue to monitor     Goal: Individualization and Mutuality  Outcome: Ongoing (interventions implemented as appropriate)    Goal: Discharge Needs Assessment  Outcome: Ongoing (interventions implemented as appropriate)    Goal: Interprofessional Rounds/Family Conf  Outcome: Ongoing (interventions implemented as appropriate)      Problem: Fall Risk (Adult)  Goal: Identify Related Risk Factors and Signs and Symptoms  Outcome: Ongoing (interventions implemented as appropriate)    Goal: Absence of Fall  Outcome: Ongoing (interventions implemented as appropriate)      Problem: Skin Injury Risk (Adult)  Goal: Identify Related Risk Factors and Signs and Symptoms  Outcome: Ongoing (interventions implemented as appropriate)    Goal: Skin Health and Integrity  Outcome: Ongoing (interventions implemented as appropriate)

## 2019-01-11 VITALS
BODY MASS INDEX: 18.44 KG/M2 | OXYGEN SATURATION: 93 % | HEIGHT: 64 IN | WEIGHT: 108 LBS | RESPIRATION RATE: 16 BRPM | DIASTOLIC BLOOD PRESSURE: 76 MMHG | SYSTOLIC BLOOD PRESSURE: 105 MMHG | TEMPERATURE: 98.5 F | HEART RATE: 62 BPM

## 2019-01-11 PROBLEM — R41.82 ALTERED MENTAL STATUS: Status: RESOLVED | Noted: 2019-01-09 | Resolved: 2019-01-11

## 2019-01-11 PROCEDURE — 99217 PR OBSERVATION CARE DISCHARGE MANAGEMENT: CPT | Performed by: INTERNAL MEDICINE

## 2019-01-11 PROCEDURE — G0378 HOSPITAL OBSERVATION PER HR: HCPCS

## 2019-01-11 RX ORDER — NICOTINE 21 MG/24HR
1 PATCH, TRANSDERMAL 24 HOURS TRANSDERMAL EVERY 24 HOURS
Qty: 30 PATCH | Refills: 0 | Status: ON HOLD | OUTPATIENT
Start: 2019-01-11 | End: 2021-01-01 | Stop reason: SDUPTHER

## 2019-01-11 RX ADMIN — CARBAMAZEPINE 200 MG: 200 TABLET ORAL at 08:33

## 2019-01-11 RX ADMIN — HYDROCODONE BITARTRATE AND ACETAMINOPHEN 1 TABLET: 5; 325 TABLET ORAL at 11:12

## 2019-01-11 RX ADMIN — PAROXETINE HYDROCHLORIDE 10 MG: 20 TABLET, FILM COATED ORAL at 08:33

## 2019-01-11 RX ADMIN — PROPRANOLOL HYDROCHLORIDE 40 MG: 20 TABLET ORAL at 08:33

## 2019-01-11 NOTE — DISCHARGE SUMMARY
[facility] Medicine Services  DISCHARGE SUMMARY    Patient Name: Maria Dolores Campa  : 1940  MRN: [id number]    Date of Admission: 2019  Date of Discharge:  2019  Primary Care Physician: Gregorio Jackson MD    Consults     No orders found from 2018 to 1/10/2019.          Hospital Course     Presenting Problem:   Altered mental status, unspecified altered mental status type [R41.82]    Active Hospital Problems    Diagnosis Date Noted   • MS (multiple sclerosis) (CMS/[facility]) [G35] 2019   • Tobacco abuse [Z72.0] 2019   • COPD (chronic obstructive pulmonary disease) (CMS/[facility]) [J44.9] 2019   • Fx humeral neck, left, sequela [S42.212S] 2019   • Declining functional status [R53.81] 2019   • Frequent falls [R29.6] 2019   • Dehydration [E86.0] 2019   • Moderate malnutrition (CMS/[facility]) [E44.0] 2017      Resolved Hospital Problems    Diagnosis Date Noted Date Resolved   • **Altered mental status [R41.82] 2019          Hospital Course:  Maria Dolores Campa is a 78 y.o. female with PMHx of ongoing tobacco use, COPD, MS, and probable undiagnosed underlying dementia brought to ED by family for general recent functional decline. Pt recently fell and broke her left humerus and had outpatient f/u with Ortho arranged, also treated for UTI during that ED evaluation. Pt has since fallen several times at home. She was admitted to our service and PT/OT were consulted. PT felt that pt would need SNF, however, pt and family were adamant that she return home. Her  is attempting to set up caregivers for home and they will do PT either outpatient vs in home.  Pt is doing well this am and wants to be discharged.                Discharge Follow Up Recommendations for labs/diagnostics:  With PCP in one week.     Day of Discharge     HPI:   Pt doing well this am. No issues overnight.     Review of Systems  Gen- No fevers,  chills  CV- No chest pain, palpitations  Resp- No cough, dyspnea  GI- No N/V/D, abd pain    Otherwise ROS is negative except as mentioned in the HPI.    Vital Signs:   Temp:  [97.6 °F (36.4 °C)-98.5 °F (36.9 °C)] 98.5 °F (36.9 °C)  Heart Rate:  [60-65] 62  Resp:  [16] 16  BP: ()/(53-76) 105/76     Physical Exam:  Constitutional: No acute distress, awake, alert, nontoxic, normal body habitus  Respiratory: Clear to auscultation bilaterally, good effort, nonlabored respirations   Cardiovascular: RRR, no murmur  Gastrointestinal: Positive bowel sounds, soft, nontender, nondistended  Musculoskeletal: No peripheral edema, normal muscle tone for age. LUE in gravity sling.   Psychiatric: Appropriate affect, poor insight and judgement, cooperative  Neurologic: Confused at times, but oriented to time and place, movements symmetricRUE and BLE, Cranial Nerves grossly intact to confrontation, speech clear and fluent.  LUE in gravity sling.  Skin: No rashes, no jaundice, no petechiae, no mottling          Pertinent  and/or Most Recent Results     Results from last 7 days   Lab Units  01/09/19   1509   WBC 10*3/mm3  5.33   HEMOGLOBIN g/dL  13.2   HEMATOCRIT %  41.9   PLATELETS 10*3/mm3  379   SODIUM mmol/L  136   POTASSIUM mmol/L  4.2   CHLORIDE mmol/L  98*   CO2 mmol/L  33.0*   BUN mg/dL  21   CREATININE mg/dL  0.78   GLUCOSE mg/dL  112*   CALCIUM mg/dL  10.1     Results from last 7 days   Lab Units  01/09/19   1509   BILIRUBIN mg/dL  0.4   ALK PHOS U/L  173*   ALT (SGPT) U/L  14   AST (SGOT) U/L  18           Invalid input(s): TG, LDLCALC, LDLREALC  Results from last 7 days   Lab Units  01/09/19   1514   TROPONIN I ng/mL  0.02       Brief Urine Lab Results  (Last result in the past 365 days)      Color   Clarity   Blood   Leuk Est   Nitrite   Protein   CREAT   Urine HCG        01/09/19 1718 Yellow Cloudy Negative Negative Negative Negative               Microbiology Results Abnormal     None          Imaging Results (all)      Procedure Component Value Units Date/Time    XR Chest 1 View [[id number]] Collected:  01/09/19 1637     Updated:  01/09/19 1644    Narrative:       EXAMINATION: XR CHEST 1 VW-01/09/2019:      INDICATION: Weak/Dizzy/AMS, triage protocol.      COMPARISON: NONE.     FINDINGS: The cardiac silhouette is normal. There is an elevated left  diaphragm. There is no pulmonary inflammatory process, mass or effusion.  There is thoracolumbar scoliosis. There is a comminuted fracture of the  left humeral neck.           Impression:       Chronic appearing pulmonary findings. There is comminuted  fracture of the neck of the left humerus.     D:  01/09/2019  E:  01/09/2019     This report was finalized on 1/9/2019 4:42 PM by Dr. Eldon Hemphill MD.                              Discharge Details        Discharge Medications      New Medications      Instructions Start Date   nicotine 21 MG/24HR patch  Commonly known as:  NICODERM CQ   1 patch, Transdermal, Every 24 Hours         Continue These Medications      Instructions Start Date   Calcium 600-400 MG-UNIT chewable tablet   1 tablet, Oral, 2 Times Daily      carBAMazepine 200 MG tablet  Commonly known as:  TEGretol   200 mg, Oral, 2 Times Daily      diphenhydrAMINE 50 MG capsule  Commonly known as:  BENADRYL   50 mg, Oral, Nightly PRN      EXCEDRIN TENSION HEADACHE 500-65 MG tablet  Generic drug:  Acetaminophen-Caffeine   1 tablet, Oral, As Needed      HYDROcodone-acetaminophen 5-325 MG per tablet  Commonly known as:  NORCO   1 tablet, Oral, Every 8 Hours PRN      MULTIVITAMIN ADULTS PO   1 tablet, Oral, Daily      PARoxetine 20 MG tablet  Commonly known as:  PAXIL   20 mg, Oral, Every Morning      propranolol 40 MG tablet  Commonly known as:  INDERAL   20 mg, Oral, 3 Times Daily      sennosides-docusate sodium 8.6-50 MG tablet  Commonly known as:  SENOKOT-S   2 tablets, Oral, Nightly             No Known Allergies      Discharge Disposition:  Home or Self Care    Discharge  Diet:  Diet Order   Procedures   • Diet Regular         Discharge Activity:       Special Instructions:      CODE STATUS:    Code Status and Medical Interventions:   Ordered at: 01/09/19 1951     Level Of Support Discussed With:    Patient     Code Status:    CPR     Medical Interventions (Level of Support Prior to Arrest):    Full         No future appointments.    Additional Instructions for the Follow-ups that You Need to Schedule     Discharge Follow-up with PCP   As directed       Currently Documented PCP:    Gregorio Jackson MD    PCP Phone Number:    [phone]     Follow Up Details:  in one week with PCP               Time Spent on Discharge:  35 minutes    Electronically signed by Charmaine John MD, 01/11/19, 10:18 AM.

## 2019-01-11 NOTE — PROGRESS NOTES
Continued Stay Note  [facility]     Patient Name: Maria Dolores Campa  MRN: [id number]  Today's Date: 1/11/2019    Admit Date: 1/9/2019    Discharge Plan     Row Name 01/11/19 1443       Plan    Plan  Home with spouse    Plan Comments  I did speak with patient's spouse yesterday. He is not here currently. If they would like home health, order placed and can arrange at a later date. Will advise staff nurse April Kearns LPN. She can speak with patient's spouse, if I am not here when he arrives. I have tried to reach him on telephone as well.          Discharge Codes    No documentation.       Expected Discharge Date and Time     Expected Discharge Date Expected Discharge Time    Jan 11, 2019             Allie Swanson RN

## 2019-01-11 NOTE — PLAN OF CARE
Problem: Patient Care Overview  Goal: Plan of Care Review  Outcome: Ongoing (interventions implemented as appropriate)   19 0652 19 1245   Coping/Psychosocial   Plan of Care Reviewed With --  patient   Plan of Care Review   Progress improving --    OTHER   Outcome Summary --  Going home when  comes around 1500 today. VSS hermann diet, and pain med today. Will continue to monitor.      Goal: Discharge Needs Assessment  Outcome: Ongoing (interventions implemented as appropriate)   01/10/19 1448   Discharge Needs Assessment   Readmission Within the Last 30 Days no previous admission in last 30 days   Patient/Family Anticipates Transition to home with family  (Patient tells me she prefers home at discharge. )   Patient/Family Anticipated Services at Transition home health care;rehabilitation services   Transportation Concerns car, none   Transportation Anticipated family or friend will provide   Anticipated Changes Related to Illness none   Equipment Needed After Discharge none   Disability   Equipment Currently Used at Home sling;rollator;bath bench       Problem: Fall Risk (Adult)  Intervention: Monitor/Assist with Self Care   19 1235   Activity   Activity Assistance Provided assistance, 1 person     Intervention: Reduce Risk/Promote Restraint Free Environment   19 1235   Safety Management   Environmental Safety Modification clutter free environment maintained   Safety Promotion/Fall Prevention activity supervised;fall prevention program maintained;gait belt;nonskid shoes/slippers when out of bed;safety round/check completed;toileting scheduled   Prevent  Drop/Fall   Safety/Security Measures bed alarm set     Intervention: Review Medications/Identify Contributors to Fall Risk   01/10/19 2000   Safety Management   Medication Review/Management medications reviewed       Goal: Identify Related Risk Factors and Signs and Symptoms  Outcome: Ongoing (interventions implemented as  appropriate)   01/11/19 1245   Fall Risk (Adult)   Related Risk Factors (Fall Risk) fear of falling;history of falls;fatigue/slow reaction;age-related changes   Signs and Symptoms (Fall Risk) presence of risk factors     Goal: Absence of Fall  Outcome: Ongoing (interventions implemented as appropriate)   01/11/19 1245   Fall Risk (Adult)   Absence of Fall making progress toward outcome       Problem: Skin Injury Risk (Adult)  Intervention: Prevent/Manage Excess Moisture   01/11/19 0525 01/11/19 0825   Hygiene Care   Perineal Care absorbent pad changed --    Bathing/Skin Care incontinence care;dressed/undressed --    Skin Interventions   Skin Protection --  adhesive use limited;incontinence pads utilized     Intervention: Maintain Head of Bed Elevation Less Than 30 Degrees as Tolerated   01/11/19 1235   Positioning   Head of Bed (HOB) HOB at 20-30 degrees     Intervention: Prevent/Minimize Shear/Friction Injuries   01/11/19 0825 01/11/19 1235   Skin Interventions   Pressure Reduction Devices pressure-redistributing mattress utilized --    Positioning   Positioning/Transfer Devices --  pillows;in use     Intervention: Prevent or Minimize Pressure   01/11/19 0825 01/11/19 1235   Skin Interventions   Pressure Reduction Techniques frequent weight shift encouraged --    Positioning   Body Position --  sitting up in bed       Goal: Identify Related Risk Factors and Signs and Symptoms  Outcome: Ongoing (interventions implemented as appropriate)   01/11/19 1245   Skin Injury Risk (Adult)   Related Risk Factors (Skin Injury Risk) advanced age     Goal: Skin Health and Integrity  Outcome: Ongoing (interventions implemented as appropriate)   01/11/19 1245   Skin Injury Risk (Adult)   Skin Health and Integrity making progress toward outcome

## 2019-01-11 NOTE — PLAN OF CARE
Problem: Patient Care Overview  Goal: Plan of Care Review  Outcome: Ongoing (interventions implemented as appropriate)   01/11/19 0652   Coping/Psychosocial   Plan of Care Reviewed With patient;family   Plan of Care Review   Progress improving   OTHER   Outcome Summary VSS, rested well, iv d/c'd and ok to leave out, spoke with Dr JOSH Jeter and she said she was ok with d/cing patient to home,  will be coming in by 1500 and bringing her sling in attempt that we get her to wear it. Will continue to monitor     Goal: Individualization and Mutuality  Outcome: Ongoing (interventions implemented as appropriate)    Goal: Discharge Needs Assessment  Outcome: Ongoing (interventions implemented as appropriate)    Goal: Interprofessional Rounds/Family Conf  Outcome: Ongoing (interventions implemented as appropriate)      Problem: Fall Risk (Adult)  Goal: Identify Related Risk Factors and Signs and Symptoms  Outcome: Ongoing (interventions implemented as appropriate)    Goal: Absence of Fall  Outcome: Ongoing (interventions implemented as appropriate)      Problem: Skin Injury Risk (Adult)  Goal: Identify Related Risk Factors and Signs and Symptoms  Outcome: Ongoing (interventions implemented as appropriate)    Goal: Skin Health and Integrity  Outcome: Ongoing (interventions implemented as appropriate)

## 2019-01-14 NOTE — THERAPY DISCHARGE NOTE
Acute Care - Occupational Therapy Discharge Summary  [facility]     Patient Name: Maria Dolores Campa  : 1940  MRN: [id number]    Today's Date: 2019  Onset of Illness/Injury or Date of Surgery: 19    Date of Referral to OT: 19  Referring Physician: ADALGISA Avitia      Admit Date: 2019        OT Recommendation and Plan    Visit Dx:    ICD-10-CM ICD-9-CM   1. Altered mental status, unspecified altered mental status type R41.82 780.97   2. Confusion R41.0 298.9   3. Recurrent falls R29.6 V15.88   4. Impaired functional mobility, balance, gait, and endurance Z74.09 V49.89   5. Impaired mobility and ADLs Z74.09 799.89               Rehab Goal Summary     Row Name 19 1237             Transfer Goal 1 (OT)    Progress/Outcome (Transfer Goal 1, OT)  goal not met;discharged from facility  -ASHELY         Dressing Goal 1 (OT)    Progress/Outcome (Dressing Goal 1, OT)  goal not met;discharged from facility  -ASHELY         Toileting Goal 1 (OT)    Progress/Outcome (Toileting Goal 1, OT)  goal not met;discharged from facility  -ASHELY         Strength Goal 1 (OT)    Progress/Outcome (Strength Goal 1, OT)  goal not met;discharged from facility  -ASHELY        User Key  (r) = Recorded By, (t) = Taken By, (c) = Cosigned By    Initials Name Provider Type Discipline    Candace Gardiner, OT Occupational Therapist OT              Therapy Suggested Charges     Code   Minutes Charges    None                 OT Discharge Summary  Reason for Discharge: Discharge from facility  Outcomes Achieved: Refer to plan of care for updates on goals achieved(Patient only seen for evaluation prior to discharge.  No goals met.)  Discharge Destination: Home with assist(spouse to hire caregivers)      Candace Rodríguez OT  2019

## 2019-01-16 ENCOUNTER — OFFICE VISIT (OUTPATIENT)
Dept: ORTHOPEDIC SURGERY | Facility: CLINIC | Age: 79
End: 2019-01-16

## 2019-01-16 VITALS — RESPIRATION RATE: 18 BRPM | BODY MASS INDEX: 17.07 KG/M2 | HEIGHT: 64 IN | WEIGHT: 100 LBS

## 2019-01-16 DIAGNOSIS — S42.292G OTHER CLOSED DISPLACED FRACTURE OF PROXIMAL END OF LEFT HUMERUS WITH DELAYED HEALING, SUBSEQUENT ENCOUNTER: Primary | ICD-10-CM

## 2019-01-16 PROCEDURE — 73030 X-RAY EXAM OF SHOULDER: CPT | Performed by: ORTHOPAEDIC SURGERY

## 2019-01-16 PROCEDURE — 99213 OFFICE O/P EST LOW 20 MIN: CPT | Performed by: ORTHOPAEDIC SURGERY

## 2019-01-16 NOTE — PROGRESS NOTES
"Subjective   Patient ID: Maria Dolores Campa is a 78 y.o. right hand dominant female is here today for follow-up for fracture recovery.  Fracture and Follow-up of the Left Shoulder       CHIEF COMPLAINT:    Fracture follow up evaluation    History of Present Illness    Pain controlled: [x] no   [] yes, continued daily pain.   Medication refill requested: [] no   [x] yes    Patient compliant with instructions: [] no   [x] yes   Other:        Past Medical History:   Diagnosis Date   • COPD (chronic obstructive pulmonary disease) (CMS/[facility])    • MS (multiple sclerosis) (CMS/[facility])         Past Surgical History:   Procedure Laterality Date   • CHOLECYSTECTOMY     • EYE SURGERY      Corneal implant        Family History   Family history unknown: Yes       Social History     Socioeconomic History   • Marital status:      Spouse name: Not on file   • Number of children: Not on file   • Years of education: Not on file   • Highest education level: Not on file   Social Needs   • Financial resource strain: Not on file   • Food insecurity - worry: Not on file   • Food insecurity - inability: Not on file   • Transportation needs - medical: Not on file   • Transportation needs - non-medical: Not on file   Occupational History   • Not on file   Tobacco Use   • Smoking status: Current Every Day Smoker     Packs/day: 0.50     Types: Cigarettes   • Smokeless tobacco: Never Used   Substance and Sexual Activity   • Alcohol use: No   • Drug use: No   • Sexual activity: Defer   Other Topics Concern   • Not on file   Social History Narrative    Right hand dominant       No Known Allergies    Review of Systems   Constitutional: Negative for fever.   Musculoskeletal: Positive for arthralgias and gait problem (limited walker ambulation, and with left shoulder fracture, has been holding her walker with right hand only and  assisting on her left side.). Negative for joint swelling.       Objective   Resp 18   Ht 162.6 cm (64\")   " Wt 45.4 kg (100 lb)   BMI 17.16 kg/m²      Signs of infection: [x] no                  [] yes   Swelling: [] no                  [x] yes, milder   Skin wound: [] healing well   [] healed well   [x] skin intact   Motor exam grossly intact: [] no                  [x] yes, tho shoulder diffuse weakness.   Neurovascular exam intact: [] no                  [x] yes   Signs of compartment syndrome: [x] no                  [] yes   Signs of DVT: [x] no                  [] yes   Other:      Physical Exam   Constitutional: She appears well-developed. No distress.   HENT:   Head: Normocephalic and atraumatic.   Eyes: EOM are normal. Pupils are equal, round, and reactive to light.   Neck: Neck supple. No tracheal deviation present.   Cardiovascular: Normal rate and regular rhythm.   Pulmonary/Chest: Breath sounds normal. No respiratory distress. She has no wheezes.   Abdominal: Soft. Bowel sounds are normal. She exhibits no distension. There is no tenderness.   Neurological: She is alert.   Skin: Skin is warm and dry.   Psychiatric: She has a normal mood and affect.   Vitals reviewed.    Left Shoulder Exam     Tenderness   Left shoulder tenderness location: diffuse shoulder pain with any movement.    Range of Motion   Active abduction: 20   Forward flexion: 30     Muscle Strength   Abduction: 3/5   Internal rotation: 4/5   External rotation: 2/5   Biceps: 3/5     Tests   Drop arm: positive    Other   Erythema: absent  Pulse: present            Neurologic Exam     Cranial Nerves     CN III, IV, VI   Pupils are equal, round, and reactive to light.  Extraocular motions are normal.       Assessment/Plan   Independent Review of Radiographic Studies:    AP, Grashey AP and lateral left shoulder, indication to evaluate fracture healing, and compared with prior imaging, shows little interim fracture healing, minimal callus formation and continued comminution and displacement of fragments and posterior angulation and displacement of  humerus head of severe impacted proximal humerus fracture.  Laboratory and Other Studies:  No new results reviewed today.   Medical Decision Making:    Stable neurovascular exam.  Limited progress with persistent and/or progressive symptoms.  Continue current management and any additional treatments and workup as outlined in plan.  Closed treatment of fracture versus surgical intervention due to persistent pain and limited fracture healing on serial exams and radiographs.  Reviewed in detail with patient and her  the relative risks (including but not limited to infection, anesthesia reaction, DVT and implant issues or complications, as well as benefits and merits of shoulder fracture surgery and consideration for reverse total shoulder replacement to address severity of pain, complexity of fracture and limited healing thus far.  Her and  would like to consider from treatment options and contact office and or return at her next appointment in 4 weeks.  Patient has chronic pain medication regimen already prescribed, and has shoulder immobilizer.  Considerations also include bone electrical stimulation though insurance usually will not approve until 90 days after fracture.  Surgical treatment of fracture and or dislocation discussed.  Medications as prescribed and only as tolerated.  Physical and occupational therapy planned.    Procedures    Maria Dolores was seen today for fracture and follow-up.    Diagnoses and all orders for this visit:    Other closed displaced fracture of proximal end of left humerus with delayed healing, subsequent encounter         Physical therapy: [] rehab facility    [x] home-based    [] outpatient referral   Ultrasound: [x]not ordered         []order given to patient   Labs: [x]not ordered         []order given to patient   Weight Bearing status: []Full []WBAT []PWB [x]NWB left arm and hand  []Other       *SPECIAL INSTRUCTIONS:  Multi-modal analgesia. Rehabilitation PT/OT  planned.      Discussion of orthopaedic goals and activities and patient and/or guardian expressed appreciation.  Risk, benefits, and merits of treatment alternatives reviewed with the patient and/or guardian and questions answered  Regular exercise as tolerated  Guided on proper techniques for mobility, strength, agility and/or conditioning exercises  The nature of the proposed surgery reviewed with the patient including risks, benefits, rehabilitation, recovery timeframe, and outcome expectations  Ice, heat, and/or modalities as beneficial  Cast, splint and/or brace care and usage instructions given  Weight bearing parameters reviewed  Take prescribed medications as instructed only as tolerated     Risks, benefits, and alternative treatments discussed with the patient: [x] Yes [] No    Risk benefits and merits of the proposed surgery were discussed and the patient's questions were answered risks discussed including and not limited to:  Anesthesia reactions  Blood loss and possible transfusion  Infection  Deep venous thrombosis and pulmonary embolus  Nerve, vascular or tendon injury  Fracture  Deformity  Stiffness  Weakness  Prosthesis and implant issues such as loosening, material wear or dislocation  Skin necrosis  Revision surgery or further treatment  Recurrence of problem and condition     Informed consent: [] Signed  [x] To be obtained at hospital  [] Both    Recommendations/Plan:  Exercise, medications, injections, other patient advice, and return appointment as noted.  Brace: Sling and swathe given previously.  Referral: No referrals made at today's visit  Test/Studies: No additional studies ordered. and Consider CT or other imaging depending on clinical progress.  Surgery: Plan non-surgical treatment for current orthopaedic condition. and For intractable painful limiting condition, patient to consider elective surgical options.  Work/Activity Status: May perform usual activities as tolerated, No strenuous  activity. and No use of involved extremity  Patient and  are encouraged to call or return for any issues or concerns.     Return in about 4 weeks (around 2/13/2019) for Recheck.  Patient agreeable to call or return sooner for any concerns.    ADDENDUM:  Patient and  called and patient would like to proceed with left shoulder reconstructive surgery for the condition.    PLANNED SURGICAL PROCEDURE: Left reverse total shoulder arthroplasty.

## 2019-01-21 ENCOUNTER — PREP FOR SURGERY (OUTPATIENT)
Dept: OTHER | Facility: HOSPITAL | Age: 79
End: 2019-01-21

## 2019-01-21 DIAGNOSIS — S42.292K: Primary | ICD-10-CM

## 2019-01-21 RX ORDER — CEFAZOLIN SODIUM 2 G/50ML
2 SOLUTION INTRAVENOUS
Status: CANCELLED | OUTPATIENT
Start: 2019-02-07 | End: 2019-02-08

## 2019-01-23 ENCOUNTER — HOSPITAL ENCOUNTER (OUTPATIENT)
Dept: GENERAL RADIOLOGY | Facility: HOSPITAL | Age: 79
Discharge: HOME | End: 2019-01-23
Admitting: ORTHOPAEDIC SURGERY

## 2019-01-23 ENCOUNTER — APPOINTMENT (OUTPATIENT)
Dept: PREADMISSION TESTING | Facility: HOSPITAL | Age: 79
End: 2019-01-23

## 2019-01-23 VITALS
DIASTOLIC BLOOD PRESSURE: 58 MMHG | HEIGHT: 68 IN | WEIGHT: 110 LBS | HEART RATE: 74 BPM | BODY MASS INDEX: 16.67 KG/M2 | SYSTOLIC BLOOD PRESSURE: 93 MMHG | OXYGEN SATURATION: 94 %

## 2019-01-23 DIAGNOSIS — S42.292K: ICD-10-CM

## 2019-01-23 LAB
ABO GROUP BLD: NORMAL
ALBUMIN SERPL-MCNC: 4.6 G/DL (ref 3.5–5)
ALBUMIN/GLOB SERPL: 1.4 G/DL (ref 1–2)
ALP SERPL-CCNC: 153 U/L (ref 38–126)
ALT SERPL W P-5'-P-CCNC: 22 U/L (ref 13–69)
ANION GAP SERPL CALCULATED.3IONS-SCNC: 13.3 MMOL/L (ref 10–20)
APTT PPP: 29.2 SECONDS (ref 25–36)
AST SERPL-CCNC: 26 U/L (ref 15–46)
BASOPHILS # BLD AUTO: 0.06 10*3/MM3 (ref 0–0.2)
BASOPHILS NFR BLD AUTO: 0.6 % (ref 0–2.5)
BILIRUB SERPL-MCNC: 0.6 MG/DL (ref 0.2–1.3)
BUN BLD-MCNC: 27 MG/DL (ref 7–20)
BUN/CREAT SERPL: 45 (ref 7.1–23.5)
CALCIUM SPEC-SCNC: 9.9 MG/DL (ref 8.4–10.2)
CHLORIDE SERPL-SCNC: 102 MMOL/L (ref 98–107)
CO2 SERPL-SCNC: 31 MMOL/L (ref 26–30)
CREAT BLD-MCNC: 0.6 MG/DL (ref 0.6–1.3)
DEPRECATED RDW RBC AUTO: 48 FL (ref 37–54)
EOSINOPHIL # BLD AUTO: 0.08 10*3/MM3 (ref 0–0.7)
EOSINOPHIL NFR BLD AUTO: 0.9 % (ref 0–7)
ERYTHROCYTE [DISTWIDTH] IN BLOOD BY AUTOMATED COUNT: 12.9 % (ref 11.5–14.5)
GFR SERPL CREATININE-BSD FRML MDRD: 97 ML/MIN/1.73
GLOBULIN UR ELPH-MCNC: 3.2 GM/DL
GLUCOSE BLD-MCNC: 104 MG/DL (ref 74–98)
HBA1C MFR BLD: 5.2 % (ref 3–6)
HCT VFR BLD AUTO: 46 % (ref 37–47)
HGB BLD-MCNC: 14.7 G/DL (ref 12–16)
IMM GRANULOCYTES # BLD AUTO: 0.04 10*3/MM3 (ref 0–0.06)
IMM GRANULOCYTES NFR BLD AUTO: 0.4 % (ref 0–0.6)
INR PPP: 1.12 (ref 0.9–1.1)
LYMPHOCYTES # BLD AUTO: 1.79 10*3/MM3 (ref 0.6–3.4)
LYMPHOCYTES NFR BLD AUTO: 19.1 % (ref 10–50)
MCH RBC QN AUTO: 32 PG (ref 27–31)
MCHC RBC AUTO-ENTMCNC: 32 G/DL (ref 30–37)
MCV RBC AUTO: 100.2 FL (ref 81–99)
MONOCYTES # BLD AUTO: 0.71 10*3/MM3 (ref 0–0.9)
MONOCYTES NFR BLD AUTO: 7.6 % (ref 0–12)
NEUTROPHILS # BLD AUTO: 6.69 10*3/MM3 (ref 2–6.9)
NEUTROPHILS NFR BLD AUTO: 71.4 % (ref 37–80)
NRBC BLD AUTO-RTO: 0 /100 WBC (ref 0–0)
PLATELET # BLD AUTO: 282 10*3/MM3 (ref 130–400)
PMV BLD AUTO: 10.8 FL (ref 6–12)
POTASSIUM BLD-SCNC: 4.3 MMOL/L (ref 3.5–5.1)
PROT SERPL-MCNC: 7.8 G/DL (ref 6.3–8.2)
PROTHROMBIN TIME: 12.5 SECONDS (ref 9.3–12.1)
RBC # BLD AUTO: 4.59 10*6/MM3 (ref 4.2–5.4)
RH BLD: NEGATIVE
SODIUM BLD-SCNC: 142 MMOL/L (ref 137–145)
WBC NRBC COR # BLD: 9.37 10*3/MM3 (ref 4.8–10.8)

## 2019-01-23 PROCEDURE — 86901 BLOOD TYPING SEROLOGIC RH(D): CPT | Performed by: ORTHOPAEDIC SURGERY

## 2019-01-23 PROCEDURE — 83036 HEMOGLOBIN GLYCOSYLATED A1C: CPT | Performed by: ORTHOPAEDIC SURGERY

## 2019-01-23 PROCEDURE — 86900 BLOOD TYPING SEROLOGIC ABO: CPT | Performed by: ORTHOPAEDIC SURGERY

## 2019-01-23 PROCEDURE — 85025 COMPLETE CBC W/AUTO DIFF WBC: CPT | Performed by: ORTHOPAEDIC SURGERY

## 2019-01-23 PROCEDURE — 36415 COLL VENOUS BLD VENIPUNCTURE: CPT

## 2019-01-23 PROCEDURE — 93005 ELECTROCARDIOGRAM TRACING: CPT | Performed by: ORTHOPAEDIC SURGERY

## 2019-01-23 PROCEDURE — 87081 CULTURE SCREEN ONLY: CPT | Performed by: ORTHOPAEDIC SURGERY

## 2019-01-23 PROCEDURE — 71046 X-RAY EXAM CHEST 2 VIEWS: CPT

## 2019-01-23 PROCEDURE — 85730 THROMBOPLASTIN TIME PARTIAL: CPT | Performed by: ORTHOPAEDIC SURGERY

## 2019-01-23 PROCEDURE — 80053 COMPREHEN METABOLIC PANEL: CPT | Performed by: ORTHOPAEDIC SURGERY

## 2019-01-23 PROCEDURE — 85610 PROTHROMBIN TIME: CPT | Performed by: ORTHOPAEDIC SURGERY

## 2019-01-23 NOTE — PAT
DURING PATIENT'S HEALTH HISTORY, EKG WAS OBTAINED. THONG DURÁN, BULL LATER VISITED PREADMISSION TESTING AND WAS GIVEN EKG READING FROM 01-09-19 ALONG WITH PRELIMINARY EKG READING FROM TODAY'S PAT VISIT.  CRNA ALSO NOTIFIED OF PATIENT'S REPORT OF HYPOTENSION, MULTIPLE SCLEROSIS AND REPORTS OF HEAVY TOBACCO USE.    AFTER CRNA VIEW EKG READINGS, HE REQUESTED THAT PATIENT HAVE CARDIAC CLEARANCE BEFORE PROCEEDING WITH SURGERY AS SCHEDULED FOR 02-07-19.    RN PHONE MARIA G MOSHER AT DR ABBASI'S OFFICE TO NOTIFY OF CRNA'S REQUEST FOR CLEARANCE. MARIA G SAID THAT SHE WOULD WORK ON THIS REQUEST.    PATIENT DISMISSED FROM PREADMISSION TESTING WITH SPOUSE. AMBULATORY WITH ASSISTANCE OF ROLLATOR WITH INSTRUCTIONS AND WIPES IN HAND AT TIME OF DISMISSAL.

## 2019-01-27 LAB — MRSA SPEC QL CULT: NORMAL

## 2019-01-30 ENCOUNTER — HOSPITAL ENCOUNTER (EMERGENCY)
Facility: HOSPITAL | Age: 79
Discharge: HOME | End: 2019-01-30
Attending: EMERGENCY MEDICINE | Admitting: EMERGENCY MEDICINE

## 2019-01-30 ENCOUNTER — APPOINTMENT (OUTPATIENT)
Dept: GENERAL RADIOLOGY | Facility: HOSPITAL | Age: 79
End: 2019-01-30

## 2019-01-30 ENCOUNTER — APPOINTMENT (OUTPATIENT)
Dept: CT IMAGING | Facility: HOSPITAL | Age: 79
End: 2019-01-30

## 2019-01-30 VITALS
OXYGEN SATURATION: 96 % | TEMPERATURE: 97.9 F | BODY MASS INDEX: 16.67 KG/M2 | DIASTOLIC BLOOD PRESSURE: 87 MMHG | HEART RATE: 70 BPM | RESPIRATION RATE: 18 BRPM | SYSTOLIC BLOOD PRESSURE: 114 MMHG | WEIGHT: 110 LBS | HEIGHT: 68 IN

## 2019-01-30 DIAGNOSIS — R41.0 CONFUSION: ICD-10-CM

## 2019-01-30 DIAGNOSIS — N39.0 URINARY TRACT INFECTION WITHOUT HEMATURIA, SITE UNSPECIFIED: Primary | ICD-10-CM

## 2019-01-30 LAB
A-A DO2: ABNORMAL MMHG
ALBUMIN SERPL-MCNC: 3.7 G/DL (ref 3.5–5)
ALBUMIN/GLOB SERPL: 1.2 G/DL (ref 1–2)
ALP SERPL-CCNC: 141 U/L (ref 38–126)
ALT SERPL W P-5'-P-CCNC: 25 U/L (ref 13–69)
AMPHET+METHAMPHET UR QL: NEGATIVE
AMPHETAMINES UR QL: NEGATIVE
ANION GAP SERPL CALCULATED.3IONS-SCNC: 7.8 MMOL/L (ref 10–20)
ARTERIAL PATENCY WRIST A: POSITIVE
AST SERPL-CCNC: 29 U/L (ref 15–46)
ATMOSPHERIC PRESS: 740 MMHG
BACTERIA UR QL AUTO: ABNORMAL /HPF
BARBITURATES UR QL SCN: NEGATIVE
BASE EXCESS BLDA CALC-SCNC: 4.6 MMOL/L (ref 0–2)
BASOPHILS # BLD AUTO: 0.04 10*3/MM3 (ref 0–0.2)
BASOPHILS NFR BLD AUTO: 0.5 % (ref 0–2.5)
BDY SITE: ABNORMAL
BENZODIAZ UR QL SCN: NEGATIVE
BILIRUB SERPL-MCNC: 0.5 MG/DL (ref 0.2–1.3)
BILIRUB UR QL STRIP: NEGATIVE
BUN BLD-MCNC: 23 MG/DL (ref 7–20)
BUN/CREAT SERPL: 28.8 (ref 7.1–23.5)
BUPRENORPHINE SERPL-MCNC: NEGATIVE NG/ML
CALCIUM SPEC-SCNC: 9.2 MG/DL (ref 8.4–10.2)
CANNABINOIDS SERPL QL: NEGATIVE
CHLORIDE SERPL-SCNC: 103 MMOL/L (ref 98–107)
CLARITY UR: ABNORMAL
CO2 SERPL-SCNC: 33 MMOL/L (ref 26–30)
COCAINE UR QL: NEGATIVE
COHGB MFR BLD: 3.4 % (ref 0–2)
COLOR UR: YELLOW
CREAT BLD-MCNC: 0.8 MG/DL (ref 0.6–1.3)
DEPRECATED RDW RBC AUTO: 45.7 FL (ref 37–54)
EOSINOPHIL # BLD AUTO: 0.06 10*3/MM3 (ref 0–0.7)
EOSINOPHIL NFR BLD AUTO: 0.7 % (ref 0–7)
ERYTHROCYTE [DISTWIDTH] IN BLOOD BY AUTOMATED COUNT: 12.6 % (ref 11.5–14.5)
GAS FLOW AIRWAY: 2 LPM
GFR SERPL CREATININE-BSD FRML MDRD: 69 ML/MIN/1.73
GLOBULIN UR ELPH-MCNC: 3.2 GM/DL
GLUCOSE BLD-MCNC: 135 MG/DL (ref 74–98)
GLUCOSE UR STRIP-MCNC: NEGATIVE MG/DL
HCO3 BLDA-SCNC: 28.9 MMOL/L (ref 22–28)
HCT VFR BLD AUTO: 44.4 % (ref 37–47)
HCT VFR BLD CALC: 41.5 %
HGB BLD-MCNC: 14.2 G/DL (ref 12–16)
HGB BLDA-MCNC: 13.5 G/DL (ref 12–18)
HGB UR QL STRIP.AUTO: ABNORMAL
HYALINE CASTS UR QL AUTO: ABNORMAL /LPF
IMM GRANULOCYTES # BLD AUTO: 0.01 10*3/MM3 (ref 0–0.06)
IMM GRANULOCYTES NFR BLD AUTO: 0.1 % (ref 0–0.6)
KETONES UR QL STRIP: NEGATIVE
LEUKOCYTE ESTERASE UR QL STRIP.AUTO: ABNORMAL
LYMPHOCYTES # BLD AUTO: 1.28 10*3/MM3 (ref 0.6–3.4)
LYMPHOCYTES NFR BLD AUTO: 14.9 % (ref 10–50)
MCH RBC QN AUTO: 31.5 PG (ref 27–31)
MCHC RBC AUTO-ENTMCNC: 32 G/DL (ref 30–37)
MCV RBC AUTO: 98.4 FL (ref 81–99)
METHADONE UR QL SCN: NEGATIVE
METHGB BLD QL: 0.6 % (ref 0–1.5)
MODALITY: ABNORMAL
MONOCYTES # BLD AUTO: 0.78 10*3/MM3 (ref 0–0.9)
MONOCYTES NFR BLD AUTO: 9.1 % (ref 0–12)
NEUTROPHILS # BLD AUTO: 6.43 10*3/MM3 (ref 2–6.9)
NEUTROPHILS NFR BLD AUTO: 74.7 % (ref 37–80)
NITRITE UR QL STRIP: POSITIVE
NOTE: ABNORMAL
NRBC BLD AUTO-RTO: 0 /100 WBC (ref 0–0)
OPIATES UR QL: NEGATIVE
OXYCODONE UR QL SCN: NEGATIVE
OXYHGB MFR BLDV: 92.4 % (ref 94–99)
PCO2 BLDA: 41.1 MM HG (ref 35–45)
PCO2 TEMP ADJ BLD: ABNORMAL MM HG (ref 35–45)
PCP UR QL SCN: NEGATIVE
PH BLDA: 7.46 PH UNITS (ref 7.3–7.5)
PH UR STRIP.AUTO: 6 [PH] (ref 5–8)
PH, TEMP CORRECTED: ABNORMAL PH UNITS
PLATELET # BLD AUTO: 271 10*3/MM3 (ref 130–400)
PMV BLD AUTO: 10.3 FL (ref 6–12)
PO2 BLDA: 72.2 MM HG (ref 75–100)
PO2 TEMP ADJ BLD: ABNORMAL MM HG (ref 83–108)
POTASSIUM BLD-SCNC: 3.8 MMOL/L (ref 3.5–5.1)
PROCALCITONIN SERPL-MCNC: <0.05 NG/ML
PROPOXYPH UR QL: NEGATIVE
PROT SERPL-MCNC: 6.9 G/DL (ref 6.3–8.2)
PROT UR QL STRIP: ABNORMAL
RBC # BLD AUTO: 4.51 10*6/MM3 (ref 4.2–5.4)
RBC # UR: ABNORMAL /HPF
REF LAB TEST METHOD: ABNORMAL
SAO2 % BLDCOA: 96.2 % (ref 94–100)
SODIUM BLD-SCNC: 140 MMOL/L (ref 137–145)
SP GR UR STRIP: 1.02 (ref 1–1.03)
SQUAMOUS #/AREA URNS HPF: ABNORMAL /HPF
TRICYCLICS UR QL SCN: NEGATIVE
TROPONIN I SERPL-MCNC: 0.01 NG/ML (ref 0–0.05)
TROPONIN I SERPL-MCNC: <0.012 NG/ML (ref 0–0.03)
UROBILINOGEN UR QL STRIP: ABNORMAL
VENTILATOR MODE: ABNORMAL
WBC NRBC COR # BLD: 8.6 10*3/MM3 (ref 4.8–10.8)
WBC UR QL AUTO: ABNORMAL /HPF

## 2019-01-30 PROCEDURE — 82375 ASSAY CARBOXYHB QUANT: CPT

## 2019-01-30 PROCEDURE — 99284 EMERGENCY DEPT VISIT MOD MDM: CPT

## 2019-01-30 PROCEDURE — 36600 WITHDRAWAL OF ARTERIAL BLOOD: CPT

## 2019-01-30 PROCEDURE — 84145 PROCALCITONIN (PCT): CPT | Performed by: EMERGENCY MEDICINE

## 2019-01-30 PROCEDURE — 96365 THER/PROPH/DIAG IV INF INIT: CPT

## 2019-01-30 PROCEDURE — 82805 BLOOD GASES W/O2 SATURATION: CPT

## 2019-01-30 PROCEDURE — 85025 COMPLETE CBC W/AUTO DIFF WBC: CPT | Performed by: EMERGENCY MEDICINE

## 2019-01-30 PROCEDURE — 84484 ASSAY OF TROPONIN QUANT: CPT

## 2019-01-30 PROCEDURE — 81001 URINALYSIS AUTO W/SCOPE: CPT | Performed by: EMERGENCY MEDICINE

## 2019-01-30 PROCEDURE — 80306 DRUG TEST PRSMV INSTRMNT: CPT | Performed by: EMERGENCY MEDICINE

## 2019-01-30 PROCEDURE — 25010000002 CEFTRIAXONE SODIUM-DEXTROSE 1-3.74 GM-%(50ML) RECONSTITUTED SOLUTION: Performed by: EMERGENCY MEDICINE

## 2019-01-30 PROCEDURE — 80053 COMPREHEN METABOLIC PANEL: CPT | Performed by: EMERGENCY MEDICINE

## 2019-01-30 PROCEDURE — 71045 X-RAY EXAM CHEST 1 VIEW: CPT

## 2019-01-30 PROCEDURE — 93005 ELECTROCARDIOGRAM TRACING: CPT | Performed by: EMERGENCY MEDICINE

## 2019-01-30 PROCEDURE — 99285 EMERGENCY DEPT VISIT HI MDM: CPT

## 2019-01-30 PROCEDURE — 84484 ASSAY OF TROPONIN QUANT: CPT | Performed by: EMERGENCY MEDICINE

## 2019-01-30 PROCEDURE — 70450 CT HEAD/BRAIN W/O DYE: CPT

## 2019-01-30 PROCEDURE — 96361 HYDRATE IV INFUSION ADD-ON: CPT

## 2019-01-30 PROCEDURE — 83050 HGB METHEMOGLOBIN QUAN: CPT

## 2019-01-30 RX ORDER — CEFTRIAXONE 1 G/50ML
1 INJECTION, SOLUTION INTRAVENOUS ONCE
Status: COMPLETED | OUTPATIENT
Start: 2019-01-30 | End: 2019-01-30

## 2019-01-30 RX ORDER — LEVOFLOXACIN 500 MG/1
500 TABLET, FILM COATED ORAL DAILY
Qty: 7 TABLET | Refills: 0 | Status: SHIPPED | OUTPATIENT
Start: 2019-01-30 | End: 2019-02-11 | Stop reason: HOSPADM

## 2019-01-30 RX ADMIN — SODIUM CHLORIDE 500 ML: 9 INJECTION, SOLUTION INTRAVENOUS at 15:26

## 2019-01-30 RX ADMIN — CEFTRIAXONE 1 G: 1 INJECTION, SOLUTION INTRAVENOUS at 16:09

## 2019-02-07 ENCOUNTER — APPOINTMENT (OUTPATIENT)
Dept: GENERAL RADIOLOGY | Facility: HOSPITAL | Age: 79
End: 2019-02-07

## 2019-02-07 ENCOUNTER — ANESTHESIA EVENT (OUTPATIENT)
Dept: PERIOP | Facility: HOSPITAL | Age: 79
End: 2019-02-07

## 2019-02-07 ENCOUNTER — HOSPITAL ENCOUNTER (INPATIENT)
Facility: HOSPITAL | Age: 79
LOS: 4 days | Discharge: NURSING, REHAB OR LONG-TERM CARE | End: 2019-02-11
Attending: ORTHOPAEDIC SURGERY | Admitting: ORTHOPAEDIC SURGERY

## 2019-02-07 ENCOUNTER — ANESTHESIA (OUTPATIENT)
Dept: PERIOP | Facility: HOSPITAL | Age: 79
End: 2019-02-07

## 2019-02-07 DIAGNOSIS — Z74.09 IMPAIRED MOBILITY AND ADLS: Primary | ICD-10-CM

## 2019-02-07 DIAGNOSIS — S42.292K: ICD-10-CM

## 2019-02-07 DIAGNOSIS — Z78.9 IMPAIRED MOBILITY AND ADLS: Primary | ICD-10-CM

## 2019-02-07 DIAGNOSIS — Z74.09 IMPAIRED FUNCTIONAL MOBILITY, BALANCE, GAIT, AND ENDURANCE: ICD-10-CM

## 2019-02-07 LAB
ABO GROUP BLD: NORMAL
BILIRUB UR QL STRIP: NEGATIVE
BLD GP AB SCN SERPL QL: NEGATIVE
CLARITY UR: CLEAR
COLOR UR: YELLOW
GLUCOSE UR STRIP-MCNC: NEGATIVE MG/DL
HGB UR QL STRIP.AUTO: NEGATIVE
KETONES UR QL STRIP: NEGATIVE
LEUKOCYTE ESTERASE UR QL STRIP.AUTO: NEGATIVE
NITRITE UR QL STRIP: NEGATIVE
PH UR STRIP.AUTO: 7 [PH] (ref 5–8)
PROT UR QL STRIP: NEGATIVE
RH BLD: NEGATIVE
SP GR UR STRIP: 1.01 (ref 1–1.03)
T&S EXPIRATION DATE: NORMAL
UROBILINOGEN UR QL STRIP: NORMAL

## 2019-02-07 PROCEDURE — 94640 AIRWAY INHALATION TREATMENT: CPT

## 2019-02-07 PROCEDURE — 99222 1ST HOSP IP/OBS MODERATE 55: CPT | Performed by: NURSE PRACTITIONER

## 2019-02-07 PROCEDURE — 86850 RBC ANTIBODY SCREEN: CPT | Performed by: ORTHOPAEDIC SURGERY

## 2019-02-07 PROCEDURE — 25010000002 FENTANYL CITRATE (PF) 100 MCG/2ML SOLUTION: Performed by: NURSE ANESTHETIST, CERTIFIED REGISTERED

## 2019-02-07 PROCEDURE — 25010000003 CEFAZOLIN SODIUM-DEXTROSE 1-4 GM-%(50ML) RECONSTITUTED SOLUTION: Performed by: ORTHOPAEDIC SURGERY

## 2019-02-07 PROCEDURE — 94799 UNLISTED PULMONARY SVC/PX: CPT

## 2019-02-07 PROCEDURE — 86901 BLOOD TYPING SEROLOGIC RH(D): CPT | Performed by: ORTHOPAEDIC SURGERY

## 2019-02-07 PROCEDURE — 23472 RECONSTRUCT SHOULDER JOINT: CPT | Performed by: ORTHOPAEDIC SURGERY

## 2019-02-07 PROCEDURE — 25010000003 CEFAZOLIN SODIUM-DEXTROSE 2-3 GM-%(50ML) RECONSTITUTED SOLUTION: Performed by: ORTHOPAEDIC SURGERY

## 2019-02-07 PROCEDURE — 73030 X-RAY EXAM OF SHOULDER: CPT

## 2019-02-07 PROCEDURE — 88311 DECALCIFY TISSUE: CPT | Performed by: ORTHOPAEDIC SURGERY

## 2019-02-07 PROCEDURE — 25010000003 CEFAZOLIN PER 500 MG: Performed by: ORTHOPAEDIC SURGERY

## 2019-02-07 PROCEDURE — C1776 JOINT DEVICE (IMPLANTABLE): HCPCS | Performed by: ORTHOPAEDIC SURGERY

## 2019-02-07 PROCEDURE — 0RRK00Z REPLACEMENT OF LEFT SHOULDER JOINT WITH REVERSE BALL AND SOCKET SYNTHETIC SUBSTITUTE, OPEN APPROACH: ICD-10-PCS | Performed by: ORTHOPAEDIC SURGERY

## 2019-02-07 PROCEDURE — 25010000002 PROPOFOL 200 MG/20ML EMULSION: Performed by: NURSE ANESTHETIST, CERTIFIED REGISTERED

## 2019-02-07 PROCEDURE — 81003 URINALYSIS AUTO W/O SCOPE: CPT | Performed by: ORTHOPAEDIC SURGERY

## 2019-02-07 PROCEDURE — 25010000002 DEXAMETHASONE PER 1 MG: Performed by: NURSE ANESTHETIST, CERTIFIED REGISTERED

## 2019-02-07 PROCEDURE — 88305 TISSUE EXAM BY PATHOLOGIST: CPT | Performed by: ORTHOPAEDIC SURGERY

## 2019-02-07 PROCEDURE — 86900 BLOOD TYPING SEROLOGIC ABO: CPT | Performed by: ORTHOPAEDIC SURGERY

## 2019-02-07 PROCEDURE — 25010000002 ONDANSETRON PER 1 MG: Performed by: NURSE ANESTHETIST, CERTIFIED REGISTERED

## 2019-02-07 PROCEDURE — 25010000002 ROPIVACAINE PER 1 MG: Performed by: NURSE ANESTHETIST, CERTIFIED REGISTERED

## 2019-02-07 PROCEDURE — 25010000002 MIDAZOLAM PER 1 MG: Performed by: NURSE ANESTHETIST, CERTIFIED REGISTERED

## 2019-02-07 PROCEDURE — 25010000002 KETOROLAC TROMETHAMINE PER 15 MG: Performed by: NURSE ANESTHETIST, CERTIFIED REGISTERED

## 2019-02-07 DEVICE — BASEPLT GLEN COMPR MINI W TPR ADAPTR 25: Type: IMPLANTABLE DEVICE | Site: SHOULDER | Status: FUNCTIONAL

## 2019-02-07 DEVICE — SCRW COMPRNSV CNTRL 6.5X20MM REUS: Type: IMPLANTABLE DEVICE | Site: SHOULDER | Status: FUNCTIONAL

## 2019-02-07 DEVICE — SCRW FIX LK HEX 4.75X20MM: Type: IMPLANTABLE DEVICE | Site: SHOULDER | Status: FUNCTIONAL

## 2019-02-07 DEVICE — SCRW FIX LK HEX 4.75X15MM: Type: IMPLANTABLE DEVICE | Site: SHOULDER | Status: FUNCTIONAL

## 2019-02-07 DEVICE — BEAR HUM COMPRNSV ARCOMXL STD 44 36: Type: IMPLANTABLE DEVICE | Site: SHOULDER | Status: FUNCTIONAL

## 2019-02-07 DEVICE — TRY HUM STD COBLT 44MM: Type: IMPLANTABLE DEVICE | Site: SHOULDER | Status: FUNCTIONAL

## 2019-02-07 DEVICE — STEM HUM/SHLDR COMPREHENSIVE MINI 7X83MM: Type: IMPLANTABLE DEVICE | Site: SHOULDER | Status: FUNCTIONAL

## 2019-02-07 DEVICE — GLENOSPHERE VERSA DIAL FIX STD 36MM: Type: IMPLANTABLE DEVICE | Site: SHOULDER | Status: FUNCTIONAL

## 2019-02-07 DEVICE — IMPLANTABLE DEVICE: Type: IMPLANTABLE DEVICE | Site: SHOULDER | Status: FUNCTIONAL

## 2019-02-07 RX ORDER — CARBAMAZEPINE 200 MG/1
200 TABLET ORAL 2 TIMES DAILY
Status: DISCONTINUED | OUTPATIENT
Start: 2019-02-07 | End: 2019-02-07

## 2019-02-07 RX ORDER — MEPERIDINE HYDROCHLORIDE 50 MG/ML
12.5 INJECTION INTRAMUSCULAR; INTRAVENOUS; SUBCUTANEOUS
Status: DISCONTINUED | OUTPATIENT
Start: 2019-02-07 | End: 2019-02-07 | Stop reason: SDUPTHER

## 2019-02-07 RX ORDER — ONDANSETRON 2 MG/ML
4 INJECTION INTRAMUSCULAR; INTRAVENOUS ONCE AS NEEDED
Status: DISCONTINUED | OUTPATIENT
Start: 2019-02-07 | End: 2019-02-07 | Stop reason: SDUPTHER

## 2019-02-07 RX ORDER — SODIUM CHLORIDE, SODIUM LACTATE, POTASSIUM CHLORIDE, CALCIUM CHLORIDE 600; 310; 30; 20 MG/100ML; MG/100ML; MG/100ML; MG/100ML
100 INJECTION, SOLUTION INTRAVENOUS CONTINUOUS
Status: DISCONTINUED | OUTPATIENT
Start: 2019-02-07 | End: 2019-02-11 | Stop reason: HOSPADM

## 2019-02-07 RX ORDER — FENTANYL CITRATE 50 UG/ML
INJECTION, SOLUTION INTRAMUSCULAR; INTRAVENOUS AS NEEDED
Status: DISCONTINUED | OUTPATIENT
Start: 2019-02-07 | End: 2019-02-07 | Stop reason: SURG

## 2019-02-07 RX ORDER — NALOXONE HCL 0.4 MG/ML
0.4 VIAL (ML) INJECTION
Status: DISCONTINUED | OUTPATIENT
Start: 2019-02-07 | End: 2019-02-11 | Stop reason: HOSPADM

## 2019-02-07 RX ORDER — PROPRANOLOL HYDROCHLORIDE 10 MG/1
20 TABLET ORAL 3 TIMES DAILY
Status: DISCONTINUED | OUTPATIENT
Start: 2019-02-07 | End: 2019-02-09

## 2019-02-07 RX ORDER — MEPERIDINE HYDROCHLORIDE 50 MG/ML
12.5 INJECTION INTRAMUSCULAR; INTRAVENOUS; SUBCUTANEOUS
Status: DISCONTINUED | OUTPATIENT
Start: 2019-02-07 | End: 2019-02-07 | Stop reason: HOSPADM

## 2019-02-07 RX ORDER — PROMETHAZINE HYDROCHLORIDE 25 MG/1
25 TABLET ORAL ONCE AS NEEDED
Status: DISCONTINUED | OUTPATIENT
Start: 2019-02-07 | End: 2019-02-07 | Stop reason: HOSPADM

## 2019-02-07 RX ORDER — CEFAZOLIN SODIUM 2 G/50ML
2 SOLUTION INTRAVENOUS
Status: COMPLETED | OUTPATIENT
Start: 2019-02-07 | End: 2019-02-07

## 2019-02-07 RX ORDER — ONDANSETRON 2 MG/ML
4 INJECTION INTRAMUSCULAR; INTRAVENOUS ONCE AS NEEDED
Status: DISCONTINUED | OUTPATIENT
Start: 2019-02-07 | End: 2019-02-07 | Stop reason: HOSPADM

## 2019-02-07 RX ORDER — SODIUM CHLORIDE 0.9 % (FLUSH) 0.9 %
3 SYRINGE (ML) INJECTION AS NEEDED
Status: DISCONTINUED | OUTPATIENT
Start: 2019-02-07 | End: 2019-02-07 | Stop reason: HOSPADM

## 2019-02-07 RX ORDER — ONDANSETRON 4 MG/1
4 TABLET, FILM COATED ORAL EVERY 6 HOURS PRN
Status: DISCONTINUED | OUTPATIENT
Start: 2019-02-07 | End: 2019-02-11 | Stop reason: HOSPADM

## 2019-02-07 RX ORDER — NICOTINE 21 MG/24HR
1 PATCH, TRANSDERMAL 24 HOURS TRANSDERMAL EVERY 24 HOURS
Status: DISCONTINUED | OUTPATIENT
Start: 2019-02-07 | End: 2019-02-11 | Stop reason: HOSPADM

## 2019-02-07 RX ORDER — SODIUM CHLORIDE 0.9 % (FLUSH) 0.9 %
3 SYRINGE (ML) INJECTION EVERY 12 HOURS SCHEDULED
Status: DISCONTINUED | OUTPATIENT
Start: 2019-02-07 | End: 2019-02-11 | Stop reason: HOSPADM

## 2019-02-07 RX ORDER — BISACODYL 10 MG
10 SUPPOSITORY, RECTAL RECTAL DAILY PRN
Status: DISCONTINUED | OUTPATIENT
Start: 2019-02-07 | End: 2019-02-11 | Stop reason: HOSPADM

## 2019-02-07 RX ORDER — FONDAPARINUX SODIUM 2.5 MG/.5ML
2.5 INJECTION SUBCUTANEOUS
Status: DISCONTINUED | OUTPATIENT
Start: 2019-02-08 | End: 2019-02-11 | Stop reason: HOSPADM

## 2019-02-07 RX ORDER — SODIUM CHLORIDE, SODIUM LACTATE, POTASSIUM CHLORIDE, CALCIUM CHLORIDE 600; 310; 30; 20 MG/100ML; MG/100ML; MG/100ML; MG/100ML
1000 INJECTION, SOLUTION INTRAVENOUS CONTINUOUS
Status: DISCONTINUED | OUTPATIENT
Start: 2019-02-07 | End: 2019-02-07

## 2019-02-07 RX ORDER — CEFAZOLIN SODIUM 1 G/50ML
1 SOLUTION INTRAVENOUS EVERY 8 HOURS
Status: COMPLETED | OUTPATIENT
Start: 2019-02-07 | End: 2019-02-08

## 2019-02-07 RX ORDER — DEXAMETHASONE SODIUM PHOSPHATE 4 MG/ML
INJECTION, SOLUTION INTRA-ARTICULAR; INTRALESIONAL; INTRAMUSCULAR; INTRAVENOUS; SOFT TISSUE AS NEEDED
Status: DISCONTINUED | OUTPATIENT
Start: 2019-02-07 | End: 2019-02-07 | Stop reason: SURG

## 2019-02-07 RX ORDER — PROMETHAZINE HYDROCHLORIDE 25 MG/1
25 SUPPOSITORY RECTAL ONCE AS NEEDED
Status: DISCONTINUED | OUTPATIENT
Start: 2019-02-07 | End: 2019-02-07 | Stop reason: SDUPTHER

## 2019-02-07 RX ORDER — ONDANSETRON 2 MG/ML
4 INJECTION INTRAMUSCULAR; INTRAVENOUS EVERY 6 HOURS PRN
Status: DISCONTINUED | OUTPATIENT
Start: 2019-02-07 | End: 2019-02-11 | Stop reason: HOSPADM

## 2019-02-07 RX ORDER — ONDANSETRON 4 MG/1
4 TABLET, ORALLY DISINTEGRATING ORAL EVERY 6 HOURS PRN
Status: DISCONTINUED | OUTPATIENT
Start: 2019-02-07 | End: 2019-02-11 | Stop reason: HOSPADM

## 2019-02-07 RX ORDER — KETOROLAC TROMETHAMINE 30 MG/ML
15 INJECTION, SOLUTION INTRAMUSCULAR; INTRAVENOUS EVERY 8 HOURS
Status: COMPLETED | OUTPATIENT
Start: 2019-02-07 | End: 2019-02-08

## 2019-02-07 RX ORDER — PROPOFOL 10 MG/ML
INJECTION, EMULSION INTRAVENOUS AS NEEDED
Status: DISCONTINUED | OUTPATIENT
Start: 2019-02-07 | End: 2019-02-07 | Stop reason: SURG

## 2019-02-07 RX ORDER — HYDROCODONE BITARTRATE AND ACETAMINOPHEN 5; 325 MG/1; MG/1
1 TABLET ORAL EVERY 8 HOURS PRN
Status: DISCONTINUED | OUTPATIENT
Start: 2019-02-07 | End: 2019-02-11 | Stop reason: HOSPADM

## 2019-02-07 RX ORDER — DOCUSATE SODIUM 100 MG/1
100 CAPSULE, LIQUID FILLED ORAL 2 TIMES DAILY PRN
Status: DISCONTINUED | OUTPATIENT
Start: 2019-02-07 | End: 2019-02-11 | Stop reason: HOSPADM

## 2019-02-07 RX ORDER — PROMETHAZINE HYDROCHLORIDE 25 MG/ML
6.25 INJECTION, SOLUTION INTRAMUSCULAR; INTRAVENOUS ONCE AS NEEDED
Status: DISCONTINUED | OUTPATIENT
Start: 2019-02-07 | End: 2019-02-07 | Stop reason: HOSPADM

## 2019-02-07 RX ORDER — CARBAMAZEPINE 200 MG/1
200 TABLET ORAL 2 TIMES DAILY PRN
Status: DISCONTINUED | OUTPATIENT
Start: 2019-02-07 | End: 2019-02-11 | Stop reason: HOSPADM

## 2019-02-07 RX ORDER — IPRATROPIUM BROMIDE AND ALBUTEROL SULFATE 2.5; .5 MG/3ML; MG/3ML
3 SOLUTION RESPIRATORY (INHALATION) ONCE
Status: COMPLETED | OUTPATIENT
Start: 2019-02-07 | End: 2019-02-07

## 2019-02-07 RX ORDER — MAGNESIUM HYDROXIDE 1200 MG/15ML
LIQUID ORAL AS NEEDED
Status: DISCONTINUED | OUTPATIENT
Start: 2019-02-07 | End: 2019-02-07 | Stop reason: HOSPADM

## 2019-02-07 RX ORDER — MEPERIDINE HYDROCHLORIDE 50 MG/ML
INJECTION INTRAMUSCULAR; INTRAVENOUS; SUBCUTANEOUS
Status: COMPLETED
Start: 2019-02-07 | End: 2019-02-07

## 2019-02-07 RX ORDER — PROPRANOLOL HYDROCHLORIDE 20 MG/1
20 TABLET ORAL 3 TIMES DAILY
Status: DISCONTINUED | OUTPATIENT
Start: 2019-02-07 | End: 2019-02-07

## 2019-02-07 RX ORDER — MIDAZOLAM HYDROCHLORIDE 1 MG/ML
INJECTION INTRAMUSCULAR; INTRAVENOUS AS NEEDED
Status: DISCONTINUED | OUTPATIENT
Start: 2019-02-07 | End: 2019-02-07 | Stop reason: SURG

## 2019-02-07 RX ORDER — SODIUM CHLORIDE 0.9 % (FLUSH) 0.9 %
1-10 SYRINGE (ML) INJECTION AS NEEDED
Status: DISCONTINUED | OUTPATIENT
Start: 2019-02-07 | End: 2019-02-11 | Stop reason: HOSPADM

## 2019-02-07 RX ORDER — ONDANSETRON 2 MG/ML
INJECTION INTRAMUSCULAR; INTRAVENOUS AS NEEDED
Status: DISCONTINUED | OUTPATIENT
Start: 2019-02-07 | End: 2019-02-07 | Stop reason: SURG

## 2019-02-07 RX ORDER — PROMETHAZINE HYDROCHLORIDE 25 MG/1
25 SUPPOSITORY RECTAL ONCE AS NEEDED
Status: DISCONTINUED | OUTPATIENT
Start: 2019-02-07 | End: 2019-02-07 | Stop reason: HOSPADM

## 2019-02-07 RX ORDER — MULTIPLE VITAMINS W/ MINERALS TAB 9MG-400MCG
1 TAB ORAL DAILY
Status: DISCONTINUED | OUTPATIENT
Start: 2019-02-08 | End: 2019-02-11 | Stop reason: HOSPADM

## 2019-02-07 RX ORDER — PROMETHAZINE HYDROCHLORIDE 25 MG/ML
6.25 INJECTION, SOLUTION INTRAMUSCULAR; INTRAVENOUS ONCE AS NEEDED
Status: DISCONTINUED | OUTPATIENT
Start: 2019-02-07 | End: 2019-02-07 | Stop reason: SDUPTHER

## 2019-02-07 RX ORDER — IPRATROPIUM BROMIDE AND ALBUTEROL SULFATE 2.5; .5 MG/3ML; MG/3ML
3 SOLUTION RESPIRATORY (INHALATION)
Status: DISCONTINUED | OUTPATIENT
Start: 2019-02-07 | End: 2019-02-11 | Stop reason: HOSPADM

## 2019-02-07 RX ORDER — PAROXETINE HYDROCHLORIDE 20 MG/1
20 TABLET, FILM COATED ORAL EVERY MORNING
Status: DISCONTINUED | OUTPATIENT
Start: 2019-02-07 | End: 2019-02-11 | Stop reason: HOSPADM

## 2019-02-07 RX ORDER — BUDESONIDE 0.5 MG/2ML
0.5 INHALANT ORAL
Status: DISCONTINUED | OUTPATIENT
Start: 2019-02-07 | End: 2019-02-11 | Stop reason: HOSPADM

## 2019-02-07 RX ORDER — SENNA AND DOCUSATE SODIUM 50; 8.6 MG/1; MG/1
2 TABLET, FILM COATED ORAL NIGHTLY
Status: DISCONTINUED | OUTPATIENT
Start: 2019-02-07 | End: 2019-02-11 | Stop reason: HOSPADM

## 2019-02-07 RX ORDER — MORPHINE SULFATE 4 MG/ML
4 INJECTION, SOLUTION INTRAMUSCULAR; INTRAVENOUS
Status: DISCONTINUED | OUTPATIENT
Start: 2019-02-07 | End: 2019-02-11 | Stop reason: HOSPADM

## 2019-02-07 RX ORDER — BUPIVACAINE HCL/0.9 % NACL/PF 0.125 %
4-14 PREFILLED PUMP RESERVOIR EPIDURAL CONTINUOUS
Status: DISCONTINUED | OUTPATIENT
Start: 2019-02-07 | End: 2019-02-11 | Stop reason: HOSPADM

## 2019-02-07 RX ORDER — LIDOCAINE HYDROCHLORIDE 20 MG/ML
INJECTION, SOLUTION EPIDURAL; INFILTRATION; INTRACAUDAL; PERINEURAL
Status: DISCONTINUED | OUTPATIENT
Start: 2019-02-07 | End: 2019-02-07 | Stop reason: SURG

## 2019-02-07 RX ORDER — PROPRANOLOL HYDROCHLORIDE 10 MG/1
20 TABLET ORAL 3 TIMES DAILY
Status: DISCONTINUED | OUTPATIENT
Start: 2019-02-07 | End: 2019-02-07 | Stop reason: ALTCHOICE

## 2019-02-07 RX ORDER — ROPIVACAINE HYDROCHLORIDE 5 MG/ML
INJECTION, SOLUTION EPIDURAL; INFILTRATION; PERINEURAL
Status: DISCONTINUED | OUTPATIENT
Start: 2019-02-07 | End: 2019-02-07 | Stop reason: SURG

## 2019-02-07 RX ORDER — ROCURONIUM BROMIDE 10 MG/ML
INJECTION, SOLUTION INTRAVENOUS AS NEEDED
Status: DISCONTINUED | OUTPATIENT
Start: 2019-02-07 | End: 2019-02-07 | Stop reason: SURG

## 2019-02-07 RX ADMIN — KETOROLAC TROMETHAMINE 15 MG: 30 INJECTION, SOLUTION INTRAMUSCULAR at 14:25

## 2019-02-07 RX ADMIN — EPHEDRINE SULFATE 5 MG: 50 INJECTION INTRAMUSCULAR; INTRAVENOUS; SUBCUTANEOUS at 09:40

## 2019-02-07 RX ADMIN — PAROXETINE HYDROCHLORIDE 20 MG: 20 TABLET, FILM COATED ORAL at 14:24

## 2019-02-07 RX ADMIN — LIDOCAINE HYDROCHLORIDE 60 MG: 20 INJECTION, SOLUTION INTRAVENOUS at 08:25

## 2019-02-07 RX ADMIN — SODIUM CHLORIDE, POTASSIUM CHLORIDE, SODIUM LACTATE AND CALCIUM CHLORIDE: 600; 310; 30; 20 INJECTION, SOLUTION INTRAVENOUS at 10:10

## 2019-02-07 RX ADMIN — SODIUM CHLORIDE, PRESERVATIVE FREE 3 ML: 5 INJECTION INTRAVENOUS at 16:44

## 2019-02-07 RX ADMIN — MEPERIDINE HYDROCHLORIDE 12.5 MG: 50 INJECTION INTRAMUSCULAR; INTRAVENOUS; SUBCUTANEOUS at 11:16

## 2019-02-07 RX ADMIN — IPRATROPIUM BROMIDE AND ALBUTEROL SULFATE 3 ML: .5; 3 SOLUTION RESPIRATORY (INHALATION) at 19:42

## 2019-02-07 RX ADMIN — NICOTINE 1 PATCH: 21 PATCH TRANSDERMAL at 14:25

## 2019-02-07 RX ADMIN — EPHEDRINE SULFATE 5 MG: 50 INJECTION INTRAMUSCULAR; INTRAVENOUS; SUBCUTANEOUS at 09:10

## 2019-02-07 RX ADMIN — CARBAMAZEPINE 200 MG: 200 TABLET ORAL at 20:50

## 2019-02-07 RX ADMIN — FENTANYL CITRATE 25 MCG: 50 INJECTION, SOLUTION INTRAMUSCULAR; INTRAVENOUS at 09:55

## 2019-02-07 RX ADMIN — BUDESONIDE 0.5 MG: 0.5 INHALANT RESPIRATORY (INHALATION) at 19:42

## 2019-02-07 RX ADMIN — ROPIVACAINE HYDROCHLORIDE 20 ML: 5 INJECTION, SOLUTION EPIDURAL; INFILTRATION; PERINEURAL at 11:38

## 2019-02-07 RX ADMIN — CEFAZOLIN SODIUM 2 G: 2 SOLUTION INTRAVENOUS at 08:24

## 2019-02-07 RX ADMIN — ONDANSETRON 4 MG: 2 INJECTION INTRAMUSCULAR; INTRAVENOUS at 10:05

## 2019-02-07 RX ADMIN — FENTANYL CITRATE 25 MCG: 50 INJECTION, SOLUTION INTRAMUSCULAR; INTRAVENOUS at 09:15

## 2019-02-07 RX ADMIN — EPHEDRINE SULFATE 5 MG: 50 INJECTION INTRAMUSCULAR; INTRAVENOUS; SUBCUTANEOUS at 08:49

## 2019-02-07 RX ADMIN — DEXAMETHASONE SODIUM PHOSPHATE 8 MG: 4 INJECTION, SOLUTION INTRAMUSCULAR; INTRAVENOUS at 10:05

## 2019-02-07 RX ADMIN — PROPRANOLOL HYDROCHLORIDE 20 MG: 10 TABLET ORAL at 20:36

## 2019-02-07 RX ADMIN — FENTANYL CITRATE 25 MCG: 50 INJECTION, SOLUTION INTRAMUSCULAR; INTRAVENOUS at 10:30

## 2019-02-07 RX ADMIN — SENNOSIDES AND DOCUSATE SODIUM 2 TABLET: 8.6; 5 TABLET ORAL at 20:37

## 2019-02-07 RX ADMIN — LIDOCAINE HYDROCHLORIDE 5 ML: 20 INJECTION, SOLUTION EPIDURAL; INFILTRATION; INTRACAUDAL; PERINEURAL at 11:38

## 2019-02-07 RX ADMIN — PROPOFOL 120 MG: 10 INJECTION, EMULSION INTRAVENOUS at 08:25

## 2019-02-07 RX ADMIN — IPRATROPIUM BROMIDE AND ALBUTEROL SULFATE 3 ML: .5; 3 SOLUTION RESPIRATORY (INHALATION) at 07:45

## 2019-02-07 RX ADMIN — SODIUM CHLORIDE, POTASSIUM CHLORIDE, SODIUM LACTATE AND CALCIUM CHLORIDE 1000 ML: 600; 310; 30; 20 INJECTION, SOLUTION INTRAVENOUS at 07:35

## 2019-02-07 RX ADMIN — MIDAZOLAM HYDROCHLORIDE 2 MG: 1 INJECTION, SOLUTION INTRAMUSCULAR; INTRAVENOUS at 08:25

## 2019-02-07 RX ADMIN — CARBAMAZEPINE 200 MG: 200 TABLET ORAL at 14:24

## 2019-02-07 RX ADMIN — ROCURONIUM BROMIDE 50 MG: 10 INJECTION INTRAVENOUS at 08:25

## 2019-02-07 RX ADMIN — FENTANYL CITRATE 25 MCG: 50 INJECTION, SOLUTION INTRAMUSCULAR; INTRAVENOUS at 08:45

## 2019-02-07 RX ADMIN — CEFAZOLIN SODIUM 1 G: 1 SOLUTION INTRAVENOUS at 23:47

## 2019-02-07 RX ADMIN — KETOROLAC TROMETHAMINE 15 MG: 30 INJECTION, SOLUTION INTRAMUSCULAR at 20:38

## 2019-02-07 RX ADMIN — CEFAZOLIN SODIUM 1 G: 1 SOLUTION INTRAVENOUS at 16:14

## 2019-02-07 RX ADMIN — FAMOTIDINE 20 MG: 10 INJECTION INTRAVENOUS at 07:38

## 2019-02-07 RX ADMIN — Medication 4 ML/HR: at 12:52

## 2019-02-07 NOTE — INTERVAL H&P NOTE
H&P reviewed. The patient was examined and there are no changes to the H&P, inclusive of the physical exam heart, lungs and procedure site specific exam as noted on the current (within 30 days) history and physical full detailed report.    Vitals reviewed on interval just signed.    Repeat UA negative.    Eldon Valadez MD  2/7/2019  8:13 AM

## 2019-02-07 NOTE — ANESTHESIA PROCEDURE NOTES
Peripheral Block    Pre-sedation assessment completed: 2/7/2019 11:34 AM    Patient reassessed immediately prior to procedure    Patient location during procedure: post-op  Start time: 2/7/2019 11:35 AM  Stop time: 2/7/2019 11:39 AM  Reason for block: procedure for pain and post-op pain management  Performed by  CRNA: Estevan Stewart CRNA  Preanesthetic Checklist  Completed: patient identified and site marked  Prep:  Pt Position: supine  Sterile barriers:cap, gloves, gown, mask and sterile barriers  Prep: ChloraPrep  Patient monitoring: blood pressure monitoring, continuous pulse oximetry and EKG  Procedure  Sedation:yes  Performed under: PNB  Guidance:ultrasound guided  ULTRASOUND INTERPRETATION. Using ultrasound guidance a 20 G gauge needle was placed in close proximity to the nerve, at which point, under ultrasound guidance anesthetic was injected in the area of the nerve and spread of the anesthesia was seen on ultrasound in close proximity thereto.  There were no abnormalities seen on ultrasound; a digital image was taken; and the patient tolerated the procedure with no complications. Images:still images not obtained  Loss of twitch: 0.5 mA  Laterality:left  Block Type:supraclavicular  Injection Technique:catheter  Needle Type:echogenic  Needle Gauge:20 G  Resistance on Injection: none  Catheter Size:20 G  Cath Depth at skin: 5 cm  Medications Used: lidocaine PF (XYLOCAINE) injection 2 %, 5 mL  ropivacaine (NAROPIN) injection 0.5 %, 20 mL  Med admintered at 2/7/2019 11:38 AM  Post Assessment  Injection Assessment: negative aspiration for heme, no paresthesia on injection and incremental injection  Patient Tolerance:comfortable throughout block  Complications:no

## 2019-02-07 NOTE — CONSULTS
[facility]      Name:  Maria Dolores Campa   Age:  78 y.o.  Sex:  female  :  1940  MRN:  [id number]   Visit Number:  [id number]  Admission Date:  2019  Date Of Service:  19  Primary Care Physician:  Gregorio Jackson MD    Consulting Physician:    LENO Santos    Referring Physician:    Dr. Valadez     Reason For Consult:  Left Shoulder      Chief Complaint:   Generalized weakness    History Of Presenting Illness:    This is a 78-year-old female who presented to Dr. Valadez's office with complaints of left shoulder pain.  Patient had a left shoulder comminuted displaced unstable four-part fracture with a delayed union.  She was brought in on the day of admission for a left reversal of a total shoulder arthroplasty with rotator cuff and tuberosity reconstruction for complex fracture.  Patient does have a history of COPD and multiple sclerosis.  She is not on any disease modifying therapy for the multiple sclerosis due to a reaction.  According to the daughter she has not walked in over a month.  Even prior to this she had difficulty with ambulation secondary to the multiple sclerosis.  The family are asking about the possibility of rehabilitation placement since she is continuing to decline.  She also has a history of COPD for which she does not use any medications at home nor does she use oxygen at home.  She is currently on 4 L of oxygen nasal cannula and according to the daughter she's been told in the past that she needs oxygen but she has not complied with this.  At the time of my evaluation patient has some rhonchorous breath sounds however according to the daughter this is her baseline.  I will start her on doing as well as Pulmicort.  I will also order an oscillating positive pressure valve to see if this will help with secretion clearance.      Review Of Systems:     General ROS: Patient denies any fevers, chills or loss of  "consciousness. Complains of generalized weakness.    Psychological ROS: Denies any hallucinations and delusions.  Ophthalmic ROS: Denies any diplopia or transient loss of vision.  ENT ROS: Denies sore throat, nasal congestion or ear pain.   Allergy and Immunology ROS: Denies rash or itching.  Hematological and Lymphatic ROS: Denies neck swelling or easy bleeding.  Endocrine ROS: Denies any recent unintentional weight gain or loss.  Breast ROS: Denies any pain or swelling.  Respiratory ROS: chronic cough with clear phlegm No shortness of breath.   Cardiovascular ROS: Denies chest pain or palpitations. No history of exertional chest pain.   Gastrointestinal ROS: Denies nausea and vomiting. Denies any abdominal pain. No diarrhea.   Genito-Urinary ROS: Denies dysuria or hematuria.  Musculoskeletal ROS: Complains of chronic back pain. No muscle pain. No calf pain. Neurological ROS: Denies any focal weakness. No loss of consciousness. Denies any numbness. Denies neck pain. Right shoulder pain  Dermatological ROS: Denies any redness or pruritis.     Past Medical History:    Past Medical History:   Diagnosis Date   • Body piercing     EARS   • Constipation    • COPD (chronic obstructive pulmonary disease) (CMS/[facility])    • Cough     NOTED WHILE PATIENT WAS IN PREADMISSION TESTING. PATIENT REPORTS \"CLEAR PHLEM\" WITH NO FEVER AND STATED \"IT'S JUST A SMOKERS COUGH\"   • Full dentures     INSTRUCTED NO ADHESIVES THE DOS   • GERD (gastroesophageal reflux disease)     REPORTS ONLY INTERMITTENT EPISODES   • Hearing loss     PATIENT REPORTS AGE RELATED. NO USE OF HEARING AIDS.    • History of fracture     REPORTS HISTORY OF FRACTURED PELVIS.  REPORTS FALL EARLY JAN 2019 RESULTING IN FRACTURED LEFT SHOULDER.   • History of pneumonia     REPORTS MULTIPLE TIMES   • History of transfusion     REPORTS AS A TEEN AND THAT SHE DID NOT HAVE ANY REACTIONS TO TRANSFUSION   • Hypotension     REPORTS \"MY BLOOD PRESSURE IS ALWAYS LOW - I HAVE NEVER " "BEEN ABLE TO DONATE BLOOD\"   • Impaired mobility     RELATED TO MULTIPLE SCLEROSIS   • MS (multiple sclerosis) (CMS/[facility])    • Tattoo    • Wears glasses        Past Surgical history:    Past Surgical History:   Procedure Laterality Date   • APPENDECTOMY      REPORTS SHE THINKS SHE HAD APPENDIX REMOVED WHEN GALLBLADDER WAS REMOVED.   • CHOLECYSTECTOMY     • EYE SURGERY      Corneal implant, PATIENT REPORTS SHE IS UNSURE LATERALITY   • MOUTH SURGERY      FULL MOUTH EXTRACTION       Social History:    Social History     Socioeconomic History   • Marital status:      Spouse name: Not on file   • Number of children: Not on file   • Years of education: Not on file   • Highest education level: Not on file   Social Needs   • Financial resource strain: Not on file   • Food insecurity - worry: Not on file   • Food insecurity - inability: Not on file   • Transportation needs - medical: Not on file   • Transportation needs - non-medical: Not on file   Occupational History   • Not on file   Tobacco Use   • Smoking status: Current Every Day Smoker     Packs/day: 0.50     Years: 58.00     Pack years: 29.00     Types: Cigarettes   • Smokeless tobacco: Never Used   • Tobacco comment: REPORTS SHE HAS SMOKED UP TO 1 PPD IN THE PAST   Substance and Sexual Activity   • Alcohol use: No   • Drug use: No   • Sexual activity: Defer   Other Topics Concern   • Not on file   Social History Narrative    Right hand dominant       Family History:    Family History   Family history unknown: Yes       Allergies:      Patient has no known allergies.    Home Medications:    Prior to Admission Medications     Prescriptions Last Dose Informant Patient Reported? Taking?    Acetaminophen-Caffeine (EXCEDRIN TENSION HEADACHE) 500-65 MG tablet Past Week Self Yes Yes    Take 1 tablet by mouth As Needed (HEADACHE).    Calcium 600-400 MG-UNIT chewable tablet 2/6/2019 Self Yes Yes    Chew 1 tablet 2 (Two) Times a Day.    carBAMazepine (TEGretol) 200 MG " tablet 2/6/2019 Self Yes Yes    Take 200 mg by mouth 2 (Two) Times a Day.    HYDROcodone-acetaminophen (NORCO) 5-325 MG per tablet 2/6/2019 Self Yes Yes    Take 1 tablet by mouth Every 8 (Eight) Hours As Needed for Mild Pain  or Moderate Pain .    Multiple Vitamins-Minerals (MULTIVITAMIN ADULTS PO) 2/6/2019 Self Yes Yes    Take 1 tablet by mouth Daily.    nicotine (NICODERM CQ) 21 MG/24HR patch Past Week Self No Yes    Place 1 patch on the skin as directed by provider Daily.    PARoxetine (PAXIL) 20 MG tablet 2/6/2019 Self Yes Yes    Take 20 mg by mouth Every Morning.    propranolol (INDERAL) 40 MG tablet 2/6/2019 Self Yes Yes    Take 20 mg by mouth 3 (Three) Times a Day.    sennosides-docusate sodium (SENOKOT-S) 8.6-50 MG tablet 2/6/2019 Self Yes Yes    Take 2 tablets by mouth Every Night.    levoFLOXacin (LEVAQUIN) 500 MG tablet   No No    Take 1 tablet by mouth Daily for 7 days.             Hospital Scheduled Meds:      [START ON 2/8/2019] calcium-vitamin D 500 mg Oral Daily   ceFAZolin 1 g Intravenous Q8H   [START ON 2/8/2019] fondaparinux 2.5 mg Subcutaneous Q24H   ketorolac 15 mg Intravenous Q8H   [START ON 2/8/2019] multivitamin with minerals 1 tablet Oral Daily   nicotine 1 patch Transdermal Q24H   PARoxetine 20 mg Oral QAM   propranolol 20 mg Oral TID   sennosides-docusate sodium 2 tablet Oral Nightly   sodium chloride 3 mL Intravenous Q12H         Bupivacaine HCl-NaCl 4-14 mL/hr Last Rate: 4 mL/hr (02/07/19 1252)   lactated ringers 100 mL/hr         Vital Signs:    Temp:  [97.1 °F (36.2 °C)-97.6 °F (36.4 °C)] 97.6 °F (36.4 °C)  Heart Rate:  [67-97] 97  Resp:  [14-18] 14  BP: (91-97)/(60-68) 91/68        02/07/19  1341   Weight: 52.4 kg (115 lb 7 oz)       Body mass index is 20.45 kg/m².    Physical Exam:    General Appearance:    Alert and cooperative, not in any acute distress.   Head:    Atraumatic and normocephalic, without obvious abnormality.   Eyes:            PERRLA, conjunctivae and sclerae normal,  no Icterus. No pallor. Extraocular movements are within normal limits.   Ears:    Ears appear intact with no abnormalities noted.   Throat:   No oral lesions, no thrush, oral mucosa moist.   Neck:   Supple, trachea midline, no thyromegaly, no carotid bruit.         Lungs:     Breath sounds heard bilaterally equally.  No crackles or wheezing. Bilateral rhonchi noted.     Heart:    Normal S1 and S2, no murmur, no gallop, no rub. No JVD   Abdomen:     Normal bowel sounds, no masses, no organomegaly. Soft        non-tender, non-distended, no guarding, no rebound                tenderness   Extremities:  Can move extremities but her strength is decreased bilaterally.  Cannot move left shoulder, no edema, no cyanosis, no Clubbing,  Muscle wasting noted in upper and lower extremities.  Dressing to left shoulder.      Pulses:   Pulses palpable and equal bilaterally   Skin:   No bleeding, bruising or rash, no skin breakdown, pink, warm and dry       Neurologic:   Alert and oriented.           Labs:    Lab Results (last 24 hours)     Procedure Component Value Units Date/Time    Tissue Pathology Exam [[id number]] Collected:  02/07/19 1017    Specimen:  Bone from Shoulder, Left Updated:  02/07/19 1310    Urinalysis With Culture If Indicated - Urine, Catheter In/Out [[id number]]  (Normal) Collected:  02/07/19 0710    Specimen:  Urine, Catheter In/Out Updated:  02/07/19 0717     Color, UA Yellow     Appearance, UA Clear     pH, UA 7.0     Specific Gravity, UA 1.015     Glucose, UA Negative     Ketones, UA Negative     Bilirubin, UA Negative     Blood, UA Negative     Protein, UA Negative     Leuk Esterase, UA Negative     Nitrite, UA Negative     Urobilinogen, UA 0.2 E.U./dL    Narrative:       Urine microscopic not indicated.          Radiology:    Imaging Results (last 72 hours)     Procedure Component Value Units Date/Time    XR Shoulder 2+ View Left [[id number]] Collected:  02/07/19 1252     Updated:  02/07/19 1305    Narrative:        LEFT SHOULDER     INDICATION: Postoperative assessment.     FINDINGS: 2 views of the left shoulder, compared with 01/16/2019  demonstrate postoperative changes of a reverse shoulder arthroplasty.  Soft tissue gas and skin staples are consistent with the recent surgery.  There does appear to be a residual fracture fragment adjacent to the  proximal humerus. Some questionable irregularity of the periphery of the  scapula/glenoid just superior to the scapular component of the  prosthesis which may also represent fracture fragments. Opacification at  the base of the left chest is likely similar to a portable chest  radiograph dated 01/30/2019 and may represent a combination of effusion  and atelectasis or infiltrate.       Impression:       Postoperative changes of the left shoulder with some  possible residual fracture fragments as detailed above.     This report was finalized on 2/7/2019 12:53 PM by Jared Brothers MD.          Assessment/Recommendations/Plan:   1.  COPD-  I will place patient on duo nebs and Pulmicort as well as order an oscillating positive pressure device to help with secretion clearance.  Patient does have a chronic cough.    2.  Multiple sclerosis with progressive decline-PT OT has been consulted.  Family does want to discuss rehab placement.      3.  Status post left reverse total shoulder arthroplasty with rotator cuff and tuberosity reconstruction for a complex fracture-orthopedics is managing    4.  Tobacco abuse- Counseling          Kay Sanchez, LENO  02/07/19  2:42 PM

## 2019-02-07 NOTE — ANESTHESIA PROCEDURE NOTES
Airway  Urgency: elective    Airway not difficult    General Information and Staff    Patient location during procedure: OR  CRNA: Estevan Stewart CRNA    Indications and Patient Condition  Indications for airway management: airway protection    Preoxygenated: yes  Mask difficulty assessment: 1 - vent by mask    Final Airway Details  Final airway type: endotracheal airway      Successful airway: ETT  Cuffed: yes   Successful intubation technique: direct laryngoscopy  Facilitating devices/methods: intubating stylet  Endotracheal tube insertion site: oral  Blade: Long  Blade size: 2  ETT size (mm): 7.5  Cormack-Lehane Classification: grade I - full view of glottis  Placement verified by: chest auscultation and capnometry   Measured from: lips  ETT to lips (cm): 22  Number of attempts at approach: 1    Additional Comments  Airway placed without problems. Dentition and Lips as pre-induction. ETT cuff inflated to minimal occlusive pressure.

## 2019-02-07 NOTE — OP NOTE
[address], [address]  [place]  [address] [phone]      OPERATIVE REPORT        PATIENT NAME:  Maria Dolores Campa                            YOB: 1940        PREOP DIAGNOSIS:    Left shoulder comminuted displaced unstable four-part     proximal humerus fracture with painful delayed union.    POSTOP DIAGNOSIS:   Same.    PROCEDURE:    Left reverse total shoulder arthroplasty with rotator cuff and tuberosity reconstruction for complex fracture.      SURGEON:     Jeffry Valadez MD    OPERATIVE TEAM:  Circulator: Selene Cerda RN; Argelia Martinez RN  Scrub Person: Bibi Weber; Curt Huang    ANESTHETIST:  CRNA: Estevan Stewart CRNA    ANESTHESIA:  General    FLUIDS:   1100 ml    ESTIM BLOOD LOSS:   100 ml    FINDINGS: Comminuted displaced unstable four-part proximal humerus fracture delayed union with initial non-surgical management.    SPECIMENS:    Humeral head to Pathology.    IMPLANTS:     [employer] reverse total shoulder arthroplasty with a Left humeral 7 mm diameter press-fit mini stem with a standard 44 x 36 mm humeral tray and ArComXL polyethylene humeral socket bearing, and a 36 mm polished metal alloy glenosphere set on 'C' offset with a +0 thickness and with a mini glenoid baseplate with a 20 mm central screw and quadrant locking screws (15 mm at the one o'clock postion,  15 mm at the four o'clock position, 20 mm at the seven o'clock position, and 20 mm at the ten o'clock position)    COMPLICATIONS:    None.    DISPOSITION:    Stable to recovery.    INDICATIONS:   Complex displaced, unstable comminuted four-part proximal humerus fracture with pain, stiffness, weakness and shoulder limitations.    NARRATIVE:    Clinical exam and imaging reveal a severe comminuted displaced unstable four-part proximal humerus head, neck and tuberosity fracture with  traumatic rotator cuff tears.  Non-surgical management has been limited with  patient complaint of continued shoulder pain with any arm movements, and markedly limited active motion of the shoulder.  Surgical alternatives were reviewed with the patient and family including the option of reverse total shoulder arthroplasty with rotator cuff and tuberosity reconstruction for the traumatic fracture condition.  Risks, benefits and alternatives were discussed and informed consent for surgical procedure obtained. Risks discussed including but not limited to anesthesia, infection, blood loss, transfusion, nerve, vessel or tendon injury, fracture, prosthetic loosening, wear or dislocation, DVT/PE and persistent shoulder pain, stiffness, weakness dysfunction or limitations.  Goals include the potential for pain relief, improved shoulder and arm mobility and function for routine daily activities.  The patient presents for the planned surgical procedure.    Antibiotic prophylaxis was given.  Surgeon site marking and a time out were performed prior to the procedure.  Anesthesia was effective and well tolerated.  The shoulder, arm and hand was prepped and draped in the usual sterile fashion.  An anterior shoulder deltopectoral incision was made to approach the shoulder joint.  The cephalic vein was protected, the conjoint tendon was partially released and tagged for later repair, and a self retaining Bell-Montilla shoulder retractor placed.  The subscapularis and lesser tuberosity bone fragmentation was and tagged with suture and for the option of subsequent repair.  The greater tuberosity bone comminution had healed to the proximal humerus shaft.  The long head of the biceps was identified, released and tagged with suture for later tenodesis. The glenoid surface was intact and with anatomic version.  There was very softened quality of in particular the glenoid bone due to baseline osteoporosis and disuse osteoporosis since the traumatic fracture and immobility of the shoulder and arm.  The labrum and  capsule were debrided as appropriate in preparation for the planned glenoid baseplate and glenosphere component.  On the humeral side, the humeral head was resected with the orientation guide and the comminuted neck fragmentation was removed and the proximal humerus prepared for the stem.  Sequential hand reamers and then sequential hand broaches were used up to the desired distal diameter, overall size and fit of the humeral mini stem.    The glenoid guide was used to pass a threaded pin in the desired location mid to low on the glenoid, central anterior to posterior, with desired near neutral glenoid version and with 10 degrees of superior tilt.  Next, over the pin the glenoid was prepared with the cannulated spherical step reamer.  The actual glenoid baseplate was press fit in place with the tamp and a mallet.  The pin was removed and the central screw was placed with standard drilling, depth gauge measure and self tapping screw placement.  Next, the four quadrant screws were placed with similar drilling, measurement off of the drill lines and with depth gauge, and locking screw placement.    The glenosphere trials, and the humeral stem and humeral head tray socket trials were assembled and showed good shoulder joint motion, position and stability.  Care was taken to keep the stem in 20 - 30 degrees of retroversion using anatomy landmarks, handle guides and to select an appropriate diameter, thickness and offset of the glenosphere for optimum, position, level, alignment, mobility and stability.   With the humeral and glenoid trials the shoulder joint showed good mobility and stability.  The trials were removed and the wound, shoulder joint and humeral canal were irrigated copiously with pulsed antibiotic solution.  After thorough irrigation, the actual glenosphere component was impacted and secured to the Hardin taper. Next, the actual humeral mini stem was press fit in place and the trial humeral tray and socket  again showed good stability and motion.  The trial humeral tray was removed and the actual implant humeral tray and socket impacted and secured to the Hardin taper of the stem.  There was again good shoulder motion, position and stability with the final implants.  The rotator cuff supraspinatus and subscapularis could be repaired with no tension using #2 Ethibond nonabsorbable suture and reinforced with #1 Vicryl sutures.  Care was taken to repair the subscapularis tendon in 20 degrees of shoulder external rotation with no tension.  A biceps tenodesis the proximal humerus was also performed.    The Lopez- Montilla retractor was removed.  Further thorough irrigation with antibiotic solution was performed and then the delto-pectoral interval, subcutaneous tissues and skin were closed.  A sterile padded dressing was applied.  The patient was placed in a shoulder immobilizer.  Anesthesia was effective and well tolerated.  There were no complications of the procedure.  The patient was transferred in stable condition to recovery and post-operative x-rays requested.

## 2019-02-07 NOTE — ANESTHESIA POSTPROCEDURE EVALUATION
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, left [S42.292K])    Surgeon:  Eldon Valadez MD Provider:  Estevan Stewart CRNA    Anesthesia Type:  general with block ASA Status:  2          Anesthesia Type: general with block  Last vitals  BP   91/68 (02/07/19 1341)   Temp   97.6 °F (36.4 °C) (02/07/19 1341)   Pulse   97 (02/07/19 1341)   Resp   14 (02/07/19 1341)     SpO2   92 % (02/07/19 1341)     Post Anesthesia Care and Evaluation    Patient location during evaluation: PACU  Patient participation: complete - patient participated  Level of consciousness: awake and alert and awake  Pain score: 3  Pain management: adequate  Airway patency: patent  Anesthetic complications: No anesthetic complications  PONV Status: controlled  Cardiovascular status: acceptable, hemodynamically stable and stable  Respiratory status: acceptable and nasal cannula  Hydration status: acceptable

## 2019-02-07 NOTE — SIGNIFICANT NOTE
02/07/19 1535   Rehab Time/Intention   Evaluation Not Performed patient/family declined evaluation  (Patient states she would prefer to wait until tomorrow. )   Rehab Treatment   Discipline occupational therapist

## 2019-02-07 NOTE — SIGNIFICANT NOTE
02/07/19 1533   Rehab Time/Intention   Evaluation Not Performed patient/family declined evaluation  (Pt adamantly states she has had too many people in and out of her room today asking questions and she is not participating with PT eval today. PT will follow up with patient tomorrow and proceed with eval as tolerated and appropriate.)   Rehab Treatment   Discipline physical therapist

## 2019-02-07 NOTE — PROGRESS NOTES
Discharge Planning Assessment   Poli     Patient Name: Maria Dolores Campa  MRN: [id number]  Today's Date: 2/7/2019    Admit Date: 2/7/2019    Discharge Needs Assessment     Row Name 02/07/19 1604       Living Environment    Lives With  spouse    Name(s) of Who Lives With Patient  Drew Campa    Current Living Arrangements  home/apartment/condo    Potentially Unsafe Housing Conditions  -- Family denies concerns    Primary Care Provided by  spouse/significant other    Provides Primary Care For  no one, unable/limited ability to care for self    Family Caregiver if Needed  spouse    Family Caregiver Names  Drew    Quality of Family Relationships  supportive    Able to Return to Prior Arrangements  other (see comments) Family reports pt needs rehab       Resource/Environmental Concerns    Resource/Environmental Concerns  none       Transition Planning    Patient/Family Anticipates Transition to  inpatient rehabilitation facility    Patient/Family Anticipated Services at Transition  rehabilitation services    Transportation Anticipated  family or friend will provide       Discharge Needs Assessment    Readmission Within the Last 30 Days  no previous admission in last 30 days    Concerns to be Addressed  denies needs/concerns at this time    Equipment Currently Used at Home  lift device;rollator;shower chair;commode;wheelchair    Anticipated Changes Related to Illness  none    Equipment Needed After Discharge  none    Offered/Gave Vendor List  yes        Discharge Plan     Row Name 02/07/19 1609       Plan    Plan  Rehab    Patient/Family in Agreement with Plan  yes    Plan Comments  Spoke with pt in room re [facility]; her , daughter and grandson are at bedside.  Pt lives in a house with her .  Family provides transportation.  Per family, pt is assisted with all activities.  Pt has a rollator, shower chair, BSC, WC, Stair lift, and they have a lift chair in the works.   Pt reports her LT goal is  to go to [facility] outpatient therapy because she had good experiences in the past.  Family would like pt to go to rehab.  Daughter worked at [facility] [place] and reports they will accept.  She reports they are currently holding a private room for pt.  However, daughter does not want  to have to drive to [place] everyday, so she will check out [facility] facilities.  Family will f/u with CM tomorrow for update where they would like referrals to be sent.  Address, phone & PCP verified.  CM will continue to follow and assist with discharge as needed.        Destination      No service coordination in this encounter.      Durable Medical Equipment      No service coordination in this encounter.      Dialysis/Infusion      No service coordination in this encounter.      Home Medical Care      No service coordination in this encounter.      Community Resources      No service coordination in this encounter.        Expected Discharge Date and Time     Expected Discharge Date Expected Discharge Time    Feb 10, 2019         Demographic Summary     Row Name 02/07/19 1557       General Information    Admission Type  inpatient    Arrived From  PACU/recovery room    Referral Source  admission list    Reason for Consult  discharge planning    Preferred Language  English     Used During This Interaction  no        Functional Status     Row Name 02/07/19 1558       Functional Status    Usual Activity Tolerance  poor       Functional Status, IADL    Medications  assistive person    Meal Preparation  assistive person    Housekeeping  assistive person    Laundry  assistive person    Shopping  assistive person       Mental Status    General Appearance WDL  WDL       Mental Status Summary    Recent Changes in Mental Status/Cognitive Functioning  no changes        Psychosocial    No documentation.       Abuse/Neglect    No documentation.       Legal    No documentation.       Substance Abuse    No documentation.       Patient  Forms    No documentation.           Essie Colón

## 2019-02-07 NOTE — PLAN OF CARE
Problem: Fall Risk (Adult)  Goal: Identify Related Risk Factors and Signs and Symptoms  Outcome: Outcome(s) achieved Date Met: 02/07/19

## 2019-02-07 NOTE — ANESTHESIA PREPROCEDURE EVALUATION
Anesthesia Evaluation     Patient summary reviewed and Nursing notes reviewed   NPO Solid Status: > 8 hours  NPO Liquid Status: > 8 hours           Airway   Mallampati: II  TM distance: >3 FB  Neck ROM: full  No difficulty expected  Dental      Pulmonary - negative pulmonary ROS and normal exam   Cardiovascular - negative cardio ROS and normal exam        Neuro/Psych- negative ROS  GI/Hepatic/Renal/Endo - negative ROS     Musculoskeletal (-) negative ROS    Abdominal  - normal exam   Substance History - negative use     OB/GYN negative ob/gyn ROS         Other - negative ROS                       Anesthesia Plan    ASA 2     general with block     intravenous induction   Anesthetic plan, all risks, benefits, and alternatives have been provided, discussed and informed consent has been obtained with: patient.    Plan discussed with CRNA.

## 2019-02-07 NOTE — ADDENDUM NOTE
Addendum  created 02/07/19 1412 by Estevan Stewart, CRNA    Child order released for a procedure order, Intraprocedure Blocks edited, LDA created via procedure documentation, Sign clinical note       [facility], [place]   Psychiatric Progress Note        Interim History:   The patient's care was discussed with the treatment team during the daily team meeting and/or staff's chart notes were reviewed.  Staff report patient Continues to have disorganized thinking. attended and participated in group.    Patient has been refusing mediations. . This morning he refused twice and then needed a lot of encouragement to take Zyprexa. Patient needed to see the medication taken out of the package. He requested medication be crushed and put in pudding and then refused to take it.     Patient is changing zer name multiple time throughout the day. Continues to demonstrate disorganized thinking.         Medications:       OLANZapine zydis  5 mg Oral 4x daily     estradiol biweekly   Transdermal Q8H     estradiol biweekly   Transdermal Once per day on Mon Thu     estradiol  1 patch Transdermal Once per day on Mon Thu     estradiol  1 patch Transdermal Once per day on Mon Thu     influenza quadrivalent (PF) vacc age 3 yrs and older  0.5 mL Intramuscular Prior to discharge     spironolactone  150 mg Oral Daily          Allergies:     Allergies   Allergen Reactions     Banana Anaphylaxis     Throat swelling          Labs:   No results found for this or any previous visit (from the past 24 hour(s)).       Psychiatric Examination:     /85  Pulse 95  Temp 96  F (35.6  C) (Oral)  Resp 16  Ht 1.829 m (6')  Wt 79.1 kg (174 lb 6.1 oz)  SpO2 99%  BMI 23.65 kg/m2  Weight is 174 lbs 6.14 oz  Body mass index is 23.65 kg/(m^2).  Orthostatic Vitals       Most Recent      Sitting Orthostatic /85 12/13 0700    Sitting Orthostatic Pulse (bpm) 95 12/13 0700    Standing Orthostatic /87 12/13 0700    Standing Orthostatic Pulse (bpm) 105 12/13 0700      Appearance: awake, alert, adequately groomed and dressed in hospital scrubs  Attitude:  guarded  Eye Contact:  good  Mood:  angry and  anxious  Affect:  intensity is heightened  Speech:  rambling  Psychomotor Behavior:  no evidence of tardive dyskinesia, dystonia, or tics  Throught Process:  disorganized  Associations:  loosening of associations present  Thought Content:  no evidence of suicidal ideation or homicidal ideation  Insight:  limited  Judgement:  limited  Oriented to:  time, person, and place  Attention Span and Concentration:  fair  Recent and Remote Memory:  intact          Precautions:     Behavioral Orders   Procedures     Assault precautions     Code 1 - Restrict to Unit     Elopement precautions     Routine Programming     As clinically indicated     Sexual precautions     Status 15     Every 15 minutes.          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   2.  Gender dysphoria.   3.  Cannabis use disorder, moderate.   4. R/o Schizophreniform disorder           Plan:     Legal: Petitioned for MICD commitment. Court supported petition. Patient is on a court hold as of 12/11 at 4:06 pm per Dara from court. Parents support petition.      Medication management: Zyprexa 10mg BID      Dispo: Stabilization with medications,, MICD treatment.

## 2019-02-07 NOTE — INTERVAL H&P NOTE
H&P reviewed. The patient was examined and there are no changes to the H&P, inclusive of the physical exam heart, lungs and procedure site specific exam as noted on the current (within 30 days) history and physical full detailed report.    Temp 97.1, pulse 75, resp 20, blood pressure 97/60    Vitals:    02/07/19 0758   BP:    Pulse:    Resp:    Temp:    SpO2: 95%

## 2019-02-08 LAB
ANION GAP SERPL CALCULATED.3IONS-SCNC: 10.3 MMOL/L (ref 10–20)
BASOPHILS # BLD AUTO: 0.02 10*3/MM3 (ref 0–0.2)
BASOPHILS NFR BLD AUTO: 0.3 % (ref 0–2.5)
BUN BLD-MCNC: 26 MG/DL (ref 7–20)
BUN/CREAT SERPL: 43.3 (ref 7.1–23.5)
CALCIUM SPEC-SCNC: 8.3 MG/DL (ref 8.4–10.2)
CHLORIDE SERPL-SCNC: 106 MMOL/L (ref 98–107)
CO2 SERPL-SCNC: 26 MMOL/L (ref 26–30)
CREAT BLD-MCNC: 0.6 MG/DL (ref 0.6–1.3)
DEPRECATED RDW RBC AUTO: 46.3 FL (ref 37–54)
EOSINOPHIL # BLD AUTO: 0.03 10*3/MM3 (ref 0–0.7)
EOSINOPHIL NFR BLD AUTO: 0.4 % (ref 0–7)
ERYTHROCYTE [DISTWIDTH] IN BLOOD BY AUTOMATED COUNT: 12.6 % (ref 11.5–14.5)
GFR SERPL CREATININE-BSD FRML MDRD: 97 ML/MIN/1.73
GLUCOSE BLD-MCNC: 92 MG/DL (ref 74–98)
HCT VFR BLD AUTO: 34.9 % (ref 37–47)
HGB BLD-MCNC: 11.1 G/DL (ref 12–16)
IMM GRANULOCYTES # BLD AUTO: 0.02 10*3/MM3 (ref 0–0.06)
IMM GRANULOCYTES NFR BLD AUTO: 0.3 % (ref 0–0.6)
LYMPHOCYTES # BLD AUTO: 1.47 10*3/MM3 (ref 0.6–3.4)
LYMPHOCYTES NFR BLD AUTO: 21.4 % (ref 10–50)
MCH RBC QN AUTO: 31.9 PG (ref 27–31)
MCHC RBC AUTO-ENTMCNC: 31.8 G/DL (ref 30–37)
MCV RBC AUTO: 100.3 FL (ref 81–99)
MONOCYTES # BLD AUTO: 0.77 10*3/MM3 (ref 0–0.9)
MONOCYTES NFR BLD AUTO: 11.2 % (ref 0–12)
NEUTROPHILS # BLD AUTO: 4.57 10*3/MM3 (ref 2–6.9)
NEUTROPHILS NFR BLD AUTO: 66.4 % (ref 37–80)
NRBC BLD AUTO-RTO: 0 /100 WBC (ref 0–0)
PLATELET # BLD AUTO: 208 10*3/MM3 (ref 130–400)
PMV BLD AUTO: 10.8 FL (ref 6–12)
POTASSIUM BLD-SCNC: 4.3 MMOL/L (ref 3.5–5.1)
RBC # BLD AUTO: 3.48 10*6/MM3 (ref 4.2–5.4)
SODIUM BLD-SCNC: 138 MMOL/L (ref 137–145)
WBC NRBC COR # BLD: 6.88 10*3/MM3 (ref 4.8–10.8)

## 2019-02-08 PROCEDURE — 85025 COMPLETE CBC W/AUTO DIFF WBC: CPT | Performed by: ORTHOPAEDIC SURGERY

## 2019-02-08 PROCEDURE — 99024 POSTOP FOLLOW-UP VISIT: CPT | Performed by: PHYSICIAN ASSISTANT

## 2019-02-08 PROCEDURE — 94799 UNLISTED PULMONARY SVC/PX: CPT

## 2019-02-08 PROCEDURE — 97162 PT EVAL MOD COMPLEX 30 MIN: CPT

## 2019-02-08 PROCEDURE — 25010000002 FONDAPARINUX PER 0.5 MG: Performed by: ORTHOPAEDIC SURGERY

## 2019-02-08 PROCEDURE — 80048 BASIC METABOLIC PNL TOTAL CA: CPT | Performed by: ORTHOPAEDIC SURGERY

## 2019-02-08 PROCEDURE — 25010000002 KETOROLAC TROMETHAMINE PER 15 MG: Performed by: NURSE ANESTHETIST, CERTIFIED REGISTERED

## 2019-02-08 PROCEDURE — 97165 OT EVAL LOW COMPLEX 30 MIN: CPT

## 2019-02-08 RX ORDER — PROPRANOLOL HYDROCHLORIDE 20 MG/1
20 TABLET ORAL 3 TIMES DAILY
COMMUNITY

## 2019-02-08 RX ADMIN — PAROXETINE HYDROCHLORIDE 20 MG: 20 TABLET, FILM COATED ORAL at 06:28

## 2019-02-08 RX ADMIN — SODIUM CHLORIDE, POTASSIUM CHLORIDE, SODIUM LACTATE AND CALCIUM CHLORIDE 100 ML/HR: 600; 310; 30; 20 INJECTION, SOLUTION INTRAVENOUS at 11:41

## 2019-02-08 RX ADMIN — BUDESONIDE 0.5 MG: 0.5 INHALANT RESPIRATORY (INHALATION) at 07:24

## 2019-02-08 RX ADMIN — PROPRANOLOL HYDROCHLORIDE 20 MG: 10 TABLET ORAL at 08:52

## 2019-02-08 RX ADMIN — PROPRANOLOL HYDROCHLORIDE 20 MG: 10 TABLET ORAL at 18:07

## 2019-02-08 RX ADMIN — CARBAMAZEPINE 200 MG: 200 TABLET ORAL at 07:51

## 2019-02-08 RX ADMIN — SODIUM CHLORIDE, PRESERVATIVE FREE 3 ML: 5 INJECTION INTRAVENOUS at 08:53

## 2019-02-08 RX ADMIN — CARBAMAZEPINE 200 MG: 200 TABLET ORAL at 18:36

## 2019-02-08 RX ADMIN — PROPRANOLOL HYDROCHLORIDE 20 MG: 10 TABLET ORAL at 13:37

## 2019-02-08 RX ADMIN — NICOTINE 1 PATCH: 21 PATCH TRANSDERMAL at 18:36

## 2019-02-08 RX ADMIN — HYDROCODONE BITARTRATE AND ACETAMINOPHEN 1 TABLET: 5; 325 TABLET ORAL at 21:44

## 2019-02-08 RX ADMIN — IPRATROPIUM BROMIDE AND ALBUTEROL SULFATE 3 ML: .5; 3 SOLUTION RESPIRATORY (INHALATION) at 07:24

## 2019-02-08 RX ADMIN — KETOROLAC TROMETHAMINE 15 MG: 30 INJECTION, SOLUTION INTRAMUSCULAR at 06:28

## 2019-02-08 RX ADMIN — FONDAPARINUX SODIUM 2.5 MG: 2.5 INJECTION, SOLUTION SUBCUTANEOUS at 08:52

## 2019-02-08 RX ADMIN — HYDROCODONE BITARTRATE AND ACETAMINOPHEN 1 TABLET: 5; 325 TABLET ORAL at 13:37

## 2019-02-08 RX ADMIN — SENNOSIDES AND DOCUSATE SODIUM 2 TABLET: 8.6; 5 TABLET ORAL at 20:22

## 2019-02-08 RX ADMIN — CALCIUM CARBONATE 500 MG (1,250 MG)-VITAMIN D3 200 UNIT TABLET 500 MG: at 08:52

## 2019-02-08 RX ADMIN — MULTIPLE VITAMINS W/ MINERALS TAB 1 TABLET: TAB at 08:52

## 2019-02-08 NOTE — PLAN OF CARE
Problem: Patient Care Overview  Goal: Plan of Care Review  Outcome: Ongoing (interventions implemented as appropriate)   02/08/19 1340   Coping/Psychosocial   Plan of Care Reviewed With patient   OTHER   Outcome Summary PT eval completed. Patient presents with decreased strength, balance, endurance and independence. She is expected to benefit from continued skilled PT intervention to improve her mobility status prior to D/C.

## 2019-02-08 NOTE — THERAPY EVALUATION
"Acute Care - Physical Therapy Initial Evaluation  [facility]     Patient Name: Maria Dolores Campa  : 1940  MRN: [id number]  Today's Date: 2019   Onset of Illness/Injury or Date of Surgery: 19  Date of Referral to PT: 19  Referring Physician: Dr. Valadez      Admit Date: 2019    Visit Dx:     ICD-10-CM ICD-9-CM   1. Impaired mobility and ADLs Z74.09 799.89   2. 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 Impaired functional mobility, balance, gait, and endurance Z74.09 V49.89     Patient Active Problem List   Diagnosis   • Urinary tract infection without hematuria   • Acute exacerbation of chronic obstructive pulmonary disease (COPD) (CMS/[facility], located within [facility])   • Moderate malnutrition (CMS/[facility], located within [facility])   • Acute metabolic encephalopathy   • MS (multiple sclerosis) (CMS/[facility], located within [facility])   • Tobacco abuse   • COPD (chronic obstructive pulmonary disease) (CMS/[facility], located within 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 PATIENT REPORTS \"CLEAR PHLEM\" WITH NO FEVER AND STATED \"IT'S JUST A SMOKERS COUGH\"   • Full dentures     INSTRUCTED NO ADHESIVES THE DOS   • GERD (gastroesophageal reflux disease)     REPORTS ONLY INTERMITTENT EPISODES   • Hearing loss     PATIENT REPORTS AGE RELATED. NO USE OF HEARING AIDS.    • History of fracture     REPORTS HISTORY OF FRACTURED PELVIS.  REPORTS FALL EARLY 2019 RESULTING IN FRACTURED LEFT SHOULDER.   • History of pneumonia     REPORTS MULTIPLE TIMES   • History of transfusion     REPORTS AS A TEEN AND THAT SHE DID NOT HAVE ANY REACTIONS TO TRANSFUSION   • Hypotension     REPORTS \"MY BLOOD PRESSURE IS ALWAYS LOW - I HAVE NEVER " "BEEN ABLE TO DONATE BLOOD\"   • Impaired mobility     RELATED TO MULTIPLE SCLEROSIS   • MS (multiple sclerosis) (CMS/HCC)    • Tattoo    • Wears glasses      Past Surgical History:   Procedure Laterality Date   • APPENDECTOMY      REPORTS SHE THINKS SHE HAD APPENDIX REMOVED WHEN GALLBLADDER WAS REMOVED.   • CHOLECYSTECTOMY     • EYE SURGERY      Corneal implant, PATIENT REPORTS SHE IS UNSURE LATERALITY   • MOUTH SURGERY      FULL MOUTH EXTRACTION   • TOTAL SHOULDER ARTHROPLASTY Left 2/7/2019    Procedure: Left reverse total shoulder arthroplasty;  Surgeon: Eldon Valadez MD;  Location: [facility];  Service: Orthopedics        PT ASSESSMENT (last 12 hours)      Physical Therapy Evaluation     Row Name 02/08/19 0921          PT Evaluation Time/Intention    Subjective Information  complains of;pain  -LM     Document Type  evaluation  -LM     Mode of Treatment  physical therapy  -LM     Patient Effort  good  -LM     Symptoms Noted During/After Treatment  none  -LM     Row Name 02/08/19 0921          General Information    Patient Profile Reviewed?  yes  -LM     Onset of Illness/Injury or Date of Surgery  02/07/19  -LM     Referring Physician  Rory  -     Patient Observations  alert;cooperative;agree to therapy  -LM     Patient/Family Observations  Pt alone in room.  -LM     General Observations of Patient  Pt received supine in bed.2 LPM O2 per n/c.  -LM     Prior Level of Function  independent:;all household mobility  -LM     Equipment Currently Used at Home  power chair, (recliner lift);commode, bedside;rollator;shower chair;wheelchair;other (see comments) stair lift  -LM     Pertinent History of Current Functional Problem  s/p L reverse TSA;MS,COPD,GERD  -LM     Existing Precautions/Restrictions  fall;non-weight bearing;left;shoulder  -LM     Risks Reviewed  patient:;increased discomfort  -LM     Benefits Reviewed  patient:;improve function;increase independence  -LM     Row Name 02/08/19 0921          " Relationship/Environment    Lives With  spouse  -LM     Row Name 02/08/19 0921          Resource/Environmental Concerns    Current Living Arrangements  home/apartment/condo  -LM     Row Name 02/08/19 0921          Home Main Entrance    Number of Stairs, Main Entrance  two  -LM     Row Name 02/08/19 0921          Stairs Within Home, Primary    Stairs, Within Home, Primary  To basement  -LM     Stairs Comment, Within Home, Primary  Has stair lift.  -LM     Row Name 02/08/19 0921          Cognitive Assessment/Intervention- PT/OT    Orientation Status (Cognition)  oriented x 4  -LM     Follows Commands (Cognition)  WFL  -LM     Safety Deficit (Cognitive)  impulsivity;safety precautions follow-through/compliance;safety precautions awareness;insight into deficits/self awareness;awareness of need for assistance  -LM     Row Name 02/08/19 0921          Safety Issues, Functional Mobility    Safety Issues Affecting Function (Mobility)  awareness of need for assistance;impulsivity;insight into deficits/self awareness;safety precaution awareness;safety precautions follow-through/compliance  -LM     Row Name 02/08/19 0921          Mobility Assessment/Treatment    Extremity Weight-bearing Status  left upper extremity  -LM     Left Upper Extremity (Weight-bearing Status)  non weight-bearing (NWB)  -LM     Row Name 02/08/19 0921          Bed Mobility Assessment/Treatment    Bed Mobility Assessment/Treatment  supine-sit  -LM     Supine-Sit [place] (Bed Mobility)  contact guard  -LM     Row Name 02/08/19 0921          Transfer Assessment/Treatment    Transfer Assessment/Treatment  sit-stand transfer;stand-sit transfer  -LM     Sit-Stand [place] (Transfers)  minimum assist (75% patient effort)  -LM     Stand-Sit [place] (Transfers)  minimum assist (75% patient effort)  -LM     [place] Level (Toilet Transfer)  minimum assist (75% patient effort)  -LM     Assistive Device (Toilet Transfer)  commode, bedside without  drop arms;other (see comments) HHA  -LM     Row Name 02/08/19 0921          Sit-Stand Transfer    Assistive Device (Sit-Stand Transfers)  other (see comments) HHA  -LM     Row Name 02/08/19 0921          Stand-Sit Transfer    Assistive Device (Stand-Sit Transfers)  other (see comments) HHA  -LM     Row Name 02/08/19 0921          Toilet Transfer    Type (Toilet Transfer)  sit-stand;stand-sit  -LM     Row Name 02/08/19 0921          Gait/Stairs Assessment/Training    Gait/Stairs Assessment/Training  gait/ambulation independence  -LM     [place] Level (Gait)  other (see comments);minimum assist (75% patient effort)  -LM     Assistive Device (Gait)  other (see comments) HHA  -LM     Distance in Feet (Gait)  12  -LM     Pattern (Gait)  step-to  -LM     Deviations/Abnormal Patterns (Gait)  base of support, narrow;senthil decreased;gait speed decreased;stride length decreased  -LM     Bilateral Gait Deviations  heel strike decreased;weight shift ability decreased  -LM     Row Name 02/08/19 0921          General ROM    GENERAL ROM COMMENTS  L UE limited  -LM     Row Name 02/08/19 0921          MMT (Manual Muscle Testing)    General MMT Comments  L UE n/t.  -LM     Row Name 02/08/19 0921          Pain Assessment    Additional Documentation  Pain Scale: Numbers Pre/Post-Treatment (Group)  -LM     Row Name 02/08/19 0921          Pain Scale: Numbers Pre/Post-Treatment    Pain Scale: Numbers, Pretreatment  2/10  -LM     Pain Scale: Numbers, Post-Treatment  0/10 - no pain  -LM     Pain Location - Side  Left  -LM     Pain Location - Orientation  upper  -LM     Pain Location  extremity  -LM     Pain Intervention(s)  Repositioned;Ambulation/increased activity  -LM     Row Name             Wound 02/07/19 0917 Left shoulder incision    Wound - Properties Group Date first assessed: 02/07/19  -JS Time first assessed: 0917  -JS Side: Left  -JS Location: shoulder  -JS Type: incision  -JS    Row Name 02/08/19 0921          Coping     Observed Emotional State  accepting;cooperative  -LM     Verbalized Emotional State  acceptance  -LM     Row Name 02/08/19 0921          Plan of Care Review    Plan of Care Reviewed With  patient  -LM     Row Name 02/08/19 0921          Physical Therapy Clinical Impression    Date of Referral to PT  02/07/19  -LM     PT Diagnosis (PT Clinical Impression)  Generalized weakness; difficulty with ambulation  -LM     Patient/Family Goals Statement (PT Clinical Impression)  Return home.  -LM     Criteria for Skilled Interventions Met (PT Clinical Impression)  yes;treatment indicated  -LM     Rehab Potential (PT Clinical Summary)  good, to achieve stated therapy goals  -LM     Care Plan Review (PT)  evaluation/treatment results reviewed;care plan/treatment goals reviewed;risks/benefits reviewed;current/potential barriers reviewed;patient/other agree to care plan  -LM     Row Name 02/08/19 0921          Vital Signs    O2 Delivery Pre Treatment  supplemental O2 2 LPM  -LM     O2 Delivery Intra Treatment  room air  -LM     O2 Delivery Post Treatment  supplemental O2 2 LPM  -LM     Row Name 02/08/19 0921          Physical Therapy Goals    Bed Mobility Goal Selection (PT)  bed mobility, PT goal 1  -LM     Transfer Goal Selection (PT)  transfer, PT goal 1  -LM     Gait Training Goal Selection (PT)  gait training, PT goal 1  -LM     Row Name 02/08/19 0921          Bed Mobility Goal 1 (PT)    Activity/Assistive Device (Bed Mobility Goal 1, PT)  sit to supine/supine to sit;bed rails  -LM     [place] Level/Cues Needed (Bed Mobility Goal 1, PT)  conditional independence  -LM     Time Frame (Bed Mobility Goal 1, PT)  2 weeks  -LM     Progress/Outcomes (Bed Mobility Goal 1, PT)  goal ongoing  -LM     Row Name 02/08/19 0921          Transfer Goal 1 (PT)    Activity/Assistive Device (Transfer Goal 1, PT)  sit-to-stand/stand-to-sit;bed-to-chair/chair-to-bed;cane, quad  -LM     [place] Level/Cues Needed (Transfer Goal 1, PT)   contact guard assist  -LM     Time Frame (Transfer Goal 1, PT)  2 weeks  -LM     Progress/Outcome (Transfer Goal 1, PT)  goal ongoing  -LM     Row Name 02/08/19 0921          Gait Training Goal 1 (PT)    Activity/Assistive Device (Gait Training Goal 1, PT)  gait (walking locomotion);assistive device use;cane, quad  -LM     [place] Level (Gait Training Goal 1, PT)  contact guard assist  -LM     Distance (Gait Goal 1, PT)  50  -LM     Time Frame (Gait Training Goal 1, PT)  2 weeks  -LM     Progress/Outcome (Gait Training Goal 1, PT)  goal ongoing  -LM     Row Name 02/08/19 0921          Patient Education Goal (PT)    Activity (Patient Education Goal, PT)  Pt will perform B LE ther ex x 15 reps.  -LM     [place]/Cues/Accuracy (Memory Goal 2, PT)  demonstrates adequately;verbalizes understanding  -LM     Time Frame (Patient Education Goal, PT)  2 weeks  -LM     Progress/Outcome (Patient Education Goal, PT)  goal ongoing  -LM     Row Name 02/08/19 0921          Positioning and Restraints    Pre-Treatment Position  in bed  -LM     Post Treatment Position  chair  -LM     In Chair  reclined;call light within reach;encouraged to call for assist  -LM     Row Name 02/08/19 0921          Living Environment    Home Accessibility  stairs within home;stairs to enter home  -LM       User Key  (r) = Recorded By, (t) = Taken By, (c) = Cosigned By    Initials Name Provider Type    Argelia Dye RN Registered Nurse    Nila Minaya, PT Physical Therapist        Physical Therapy Education     Title: PT OT SLP Therapies (In Progress)     Topic: Physical Therapy (Done)     Point: Mobility training (Done)     Learning Progress Summary           Patient Acceptance, E,TB, VU by ABIEL at 2/8/2019  1:40 PM    Comment:  Purpose of PT/POC.                   Point: Home exercise program (Done)     Learning Progress Summary           Patient Acceptance, E,TB, VU by ABIEL at 2/8/2019  1:40 PM    Comment:  Purpose of PT/POC.                    Point: Precautions (Done)     Learning Progress Summary           Patient Acceptance, E,TB, VU by ABIEL at 2/8/2019  1:40 PM    Comment:  Purpose of PT/POC.                               User Key     Initials Effective Dates Name Provider Type Discipline    ABIEL 04/03/18 -  Nila Torrez, PT Physical Therapist PT              PT Recommendation and Plan  Anticipated Discharge Disposition (PT): inpatient rehabilitation facility  Planned Therapy Interventions (PT Eval): balance training, bed mobility training, gait training, home exercise program, patient/family education, strengthening, transfer training  Therapy Frequency (PT Clinical Impression): daily  Outcome Summary/Treatment Plan (PT)  Anticipated Discharge Disposition (PT): inpatient rehabilitation facility  Plan of Care Reviewed With: patient  Outcome Summary: PT eval completed. Patient presents with decreased strength, balance, endurance and independence. She is expected to benefit from continued skilled PT intervention to improve her mobility status prior to D/C.  Outcome Measures     Row Name 02/08/19 0924 02/08/19 0921          How much help from another person do you currently need...    Turning from your back to your side while in flat bed without using bedrails?  --  3  -LM     Moving from lying on back to sitting on the side of a flat bed without bedrails?  --  3  -LM     Moving to and from a bed to a chair (including a wheelchair)?  --  3  -LM     Standing up from a chair using your arms (e.g., wheelchair, bedside chair)?  --  3  -LM     Climbing 3-5 steps with a railing?  --  1  -LM     To walk in hospital room?  --  3  -LM     AM-PAC 6 Clicks Score  --  16  -LM        How much help from another is currently needed...    Putting on and taking off regular lower body clothing?  3  -AH  --     Bathing (including washing, rinsing, and drying)  3  -AH  --     Toileting (which includes using toilet bed pan or urinal)  3  -AH  --     Putting on and taking  off regular upper body clothing  3  -AH  --     Taking care of personal grooming (such as brushing teeth)  3  -AH  --     Eating meals  3  -AH  --     Score  18  -AH  --        Functional Assessment    Outcome Measure Options  AM-PAC 6 Clicks Daily Activity (OT)  -  AM-PAC 6 Clicks Basic Mobility (PT)  -       User Key  (r) = Recorded By, (t) = Taken By, (c) = Cosigned By    Initials Name Provider Type     Deb Blum Occupational Therapist     Nila Torrez, LIBIA Physical Therapist         Time Calculation:   PT Charges     Row Name 02/08/19 1341             Time Calculation    Start Time  0921  -LM      PT Received On  02/08/19  -      PT Goal Re-Cert Due Date  02/18/19  -        User Key  (r) = Recorded By, (t) = Taken By, (c) = Cosigned By    Initials Name Provider Type     Nila Torrez, LIBIA Physical Therapist        Therapy Suggested Charges     Code   Minutes Charges    None           Therapy Charges for Today     Code Description Service Date Service Provider Modifiers Qty    [id number]  PT EVAL MOD COMPLEXITY 4 2/8/2019 Nila Torrez, PT GP 1          PT G-Codes  Outcome Measure Options: AM-PAC 6 Clicks Daily Activity (OT)  AM-PAC 6 Clicks Score: 16  Score: 18      Nila Torrez PT  2/8/2019

## 2019-02-08 NOTE — THERAPY EVALUATION
"Acute Care - Occupational Therapy Initial Evaluation  [facility]     Patient Name: Maria Dolores Campa  : 1940  MRN: [id number]  Today's Date: 2019  Onset of Illness/Injury or Date of Surgery: 19  Date of Referral to OT: 19  Referring Physician: Dr. Valadez    Admit Date: 2019       ICD-10-CM ICD-9-CM   1. Impaired mobility and ADLs Z74.09 799.89   2. 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 PATIENT REPORTS \"CLEAR PHLEM\" WITH NO FEVER AND STATED \"IT'S JUST A SMOKERS COUGH\"   • Full dentures     INSTRUCTED NO ADHESIVES THE DOS   • GERD (gastroesophageal reflux disease)     REPORTS ONLY INTERMITTENT EPISODES   • Hearing loss     PATIENT REPORTS AGE RELATED. NO USE OF HEARING AIDS.    • History of fracture     REPORTS HISTORY OF FRACTURED PELVIS.  REPORTS FALL EARLY 2019 RESULTING IN FRACTURED LEFT SHOULDER.   • History of pneumonia     REPORTS MULTIPLE TIMES   • History of transfusion     REPORTS AS A TEEN AND THAT SHE DID NOT HAVE ANY REACTIONS TO TRANSFUSION   • Hypotension     REPORTS \"MY BLOOD PRESSURE IS ALWAYS LOW - I HAVE NEVER BEEN ABLE TO " "DONATE BLOOD\"   • Impaired mobility     RELATED TO MULTIPLE SCLEROSIS   • MS (multiple sclerosis) (CMS/HCC)    • Tattoo    • Wears glasses      Past Surgical History:   Procedure Laterality Date   • APPENDECTOMY      REPORTS SHE THINKS SHE HAD APPENDIX REMOVED WHEN GALLBLADDER WAS REMOVED.   • CHOLECYSTECTOMY     • EYE SURGERY      Corneal implant, PATIENT REPORTS SHE IS UNSURE LATERALITY   • MOUTH SURGERY      FULL MOUTH EXTRACTION   • TOTAL SHOULDER ARTHROPLASTY Left 2/7/2019    Procedure: Left reverse total shoulder arthroplasty;  Surgeon: Eldon Valadez MD;  Location: [facility];  Service: Orthopedics          OT ASSESSMENT FLOWSHEET (last 72 hours)      Occupational Therapy Evaluation     Row Name 02/08/19 1300                   OT Evaluation Time/Intention    Subjective Information  complains of;pain  -        Document Type  evaluation  -        Mode of Treatment  occupational therapy  -        Patient Effort  good  -        Symptoms Noted During/After Treatment  none  -           General Information    Patient Profile Reviewed?  yes  -        Onset of Illness/Injury or Date of Surgery  02/07/19  -        Referring Physician  Dr. Valadez  -        Patient Observations  alert;cooperative;agree to therapy  -        Patient/Family Observations  Pt alone  -        General Observations of Patient  Pt received supine in bed on 2L  -        Prior Level of Function  independent:;all household mobility;ADL's prior to fall ~ 1 month ago  -        Equipment Currently Used at Home  power chair, (recliner lift);shower chair;rollator;commode, bedside;wheelchair stair lift  -        Pertinent History of Current Functional Problem  closed fracture of proximal humerus, s/p L reverse total shoulder  -        Existing Precautions/Restrictions  fall;non-weight bearing;left;shoulder  -        Risks Reviewed  patient:;increased discomfort  -        Benefits Reviewed  patient:;improve " function;increase independence;increase strength  -           Relationship/Environment    Lives With  spouse  -           Resource/Environmental Concerns    Current Living Arrangements  home/apartment/condo  -           Home Main Entrance    Number of Stairs, Main Entrance  two  -           Stairs Within Home, Primary    Stairs, Within Home, Primary  to basement  -        Stairs Comment, Within Home, Primary  has stair chair lift  -           Cognitive Assessment/Intervention- PT/OT    Orientation Status (Cognition)  oriented x 4  -        Follows Commands (Cognition)  WFL  -        Safety Deficit (Cognitive)  impulsivity;safety precautions awareness;safety precautions follow-through/compliance  -           Safety Issues, Functional Mobility    Safety Issues Affecting Function (Mobility)  impulsivity;safety precaution awareness;safety precautions follow-through/compliance  -           Mobility Assessment/Treatment    Extremity Weight-bearing Status  left upper extremity  -        Left Upper Extremity (Weight-bearing Status)  non weight-bearing (NWB)  -           Bed Mobility Assessment/Treatment    Bed Mobility Assessment/Treatment  supine-sit  -        Supine-Sit [place] (Bed Mobility)  contact guard  -           Functional Mobility    Functional Mobility- Ind. Level  minimum assist (75% patient effort)  -        Functional Mobility- Device  other (see comments) A   [facility]        Functional Mobility-Distance (Feet)  12  -        Functional Mobility- Safety Issues  balance decreased during turns  -           Transfer Assessment/Treatment    Transfer Assessment/Treatment  sit-stand transfer;stand-sit transfer;toilet transfer  -           Sit-Stand Transfer    Sit-Stand [place] (Transfers)  minimum assist (75% patient effort)  -        Assistive Device (Sit-Stand Transfers)  other (see comments) [facility]           Stand-Sit Transfer    Stand-Sit [place] (Transfers)   minimum assist (75% patient effort)  -        Assistive Device (Stand-Sit Transfers)  other (see comments) [facility]  -           Toilet Transfer    Type (Toilet Transfer)  sit-stand;stand-sit  -        [place] Level (Toilet Transfer)  minimum assist (75% patient effort)  -        Assistive Device (Toilet Transfer)  commode, bedside without drop arms  -           ADL Assessment/Intervention    BADL Assessment/Intervention  bathing;upper body dressing;lower body dressing;grooming;feeding;toileting  -           Bathing Assessment/Intervention    Bathing [place] Level  minimum assist (75% patient effort)  -           Upper Body Dressing Assessment/Training    Upper Body Dressing [place] Level  minimum assist (75% patient effort)  -           Lower Body Dressing Assessment/Training    Lower Body Dressing [place] Level  minimum assist (75% patient effort)  -           Grooming Assessment/Training    [place] Level (Grooming)  minimum assist (75% patient effort)  -           Self-Feeding Assessment/Training    [place] Level (Feeding)  set up  -           Toileting Assessment/Training    [place] Level (Toileting)  minimum assist (75% patient effort)  -           BADL Safety/Performance    Impairments, BADL Safety/Performance  range of motion;pain;strength;endurance/activity tolerance  -           General ROM    GENERAL ROM COMMENTS  GLORIA WFL, JULIUS limited d/t recent shoulder sx  -           MMT (Manual Muscle Testing)    General MMT Comments  GLORIA 4/5, JULIUS n/t  -           Positioning and Restraints    Pre-Treatment Position  in bed  -        Post Treatment Position  chair  -        In Chair  reclined;call light within reach;encouraged to call for assist  -           Pain Assessment    Additional Documentation  Pain Scale: Numbers Pre/Post-Treatment (Group)  -           Pain Scale: Numbers Pre/Post-Treatment    Pain Scale: Numbers, Pretreatment  2/10  -         Pain Scale: Numbers, Post-Treatment  0/10 - no pain  -        Pain Location - Side  Left  -        Pain Location - Orientation  upper  -        Pain Location  extremity  -        Pain Intervention(s)  Repositioned;Ambulation/increased activity  -           Wound 02/07/19 0917 Left shoulder incision    Wound - Properties Group Date first assessed: 02/07/19  -JS Time first assessed: 0917  -JS Side: Left  -JS Location: shoulder  -JS Type: incision  -JS       Coping    Observed Emotional State  accepting;cooperative  -        Verbalized Emotional State  acceptance  -           Plan of Care Review    Plan of Care Reviewed With  patient  -           Clinical Impression (OT)    Date of Referral to OT  02/07/19  -        OT Diagnosis  ADL decline  -        Patient/Family Goals Statement (OT Eval)  Pt wants to d/c home, but is willing to d/c to rehab  -        Criteria for Skilled Therapeutic Interventions Met (OT Eval)  yes;treatment indicated  -        Rehab Potential (OT Eval)  good, to achieve stated therapy goals  -        Therapy Frequency (OT Eval)  daily mon-fri  -        Care Plan Review (OT)  evaluation/treatment results reviewed;patient/other agree to care plan  -        Anticipated Discharge Disposition (OT)  inpatient rehabilitation facility  -           Vital Signs    O2 Delivery Pre Treatment  supplemental O2  -        O2 Delivery Intra Treatment  room air  -        O2 Delivery Post Treatment  supplemental O2  -           OT Goals    Transfer Goal Selection (OT)  transfer, OT goal 1  -        Dressing Goal Selection (OT)  dressing, OT goal 1;dressing, OT goal 2  -        ROM Goal Selection (OT)  ROM, OT goal 1  -        Functional Mobility Goal Selection (OT)  functional mobility, OT goal 1  -        Additional Documentation  ROM Goal Selection (OT) (Row);Functional Mobility Selection (OT) (Row)  -           Transfer Goal 1 (OT)    Activity/Assistive Device  (Transfer Goal 1, OT)  sit-to-stand/stand-to-sit;cane, quad  -AH        [place] Level/Cues Needed (Transfer Goal 1, OT)  contact guard assist  -AH        Time Frame (Transfer Goal 1, OT)  long term goal (LTG);2 weeks  -AH        Progress/Outcome (Transfer Goal 1, OT)  goal ongoing  -AH           Dressing Goal 1 (OT)    Activity/Assistive Device (Dressing Goal 1, OT)  lower body dressing  -AH        [place]/Cues Needed (Dressing Goal 1, OT)  contact guard assist  -AH        Time Frame (Dressing Goal 1, OT)  long term goal (LTG);2 weeks  -AH        Progress/Outcome (Dressing Goal 1, OT)  goal ongoing  -AH           Dressing Goal 2 (OT)    Activity/Assistive Device (Dressing Goal 2, OT)  upper body dressing  -AH        [place]/Cues Needed (Dressing Goal 2, OT)  contact guard assist  -AH        Time Frame (Dressing Goal 2, OT)  long term goal (LTG);2 weeks  -AH        Progress/Outcome (Dressing Goal 2, OT)  goal ongoing  -AH           ROM Goal 1 (OT)    ROM Goal 1 (OT)  Pt will be independent with HEP for LUE.  -AH        Time Frame (ROM Goal 1, OT)  long term goal (LTG);2 weeks  -AH        Progress/Outcome (ROM Goal 1, OT)  goal ongoing  -AH           Functional Mobility Goal 1 (OT)    Activity/Assistive Device (Functional Mobility Goal 1, OT)  cane, quad  -AH        [place] Level/Cues Needed (Functional Mobility Goal 1, OT)  contact guard assist  -AH        Distance Goal 1 (Functional Mobility, OT)  100  -AH        Time Frame (Functional Mobility Goal 1, OT)  long term goal (LTG);2 weeks  -AH        Progress/Outcome (Functional Mobility Goal 1, OT)  goal ongoing  -           Living Environment    Home Accessibility  stairs to enter home;stairs within home  -          User Key  (r) = Recorded By, (t) = Taken By, (c) = Cosigned By    Initials Name Effective Dates    Deb Constantino 03/07/18 -     Argelia Dye RN 07/14/16 -          Occupational Therapy Education     Title: PT OT SLP  Therapies (In Progress)     Topic: Occupational Therapy (In Progress)     Point: ADL training (Done)     Description: Instruct learner(s) on proper safety adaptation and remediation techniques during self care or transfers.   Instruct in proper use of assistive devices.    Learning Progress Summary           Patient Acceptance, E, VU by  at 2/8/2019  1:37 PM    Comment:  Role of OT/POC                   Point: Home exercise program (Done)     Description: Instruct learner(s) on appropriate technique for monitoring, assisting and/or progressing therapeutic exercises/activities.    Learning Progress Summary           Patient Acceptance, E,H, VU,DU,NR by  at 2/8/2019  1:37 PM    Comment:  JULIUS ex                               User Key     Initials Effective Dates Name Provider Type Discipline     03/07/18 -  Deb Blum Occupational Therapist OT                  OT Recommendation and Plan  Outcome Summary/Treatment Plan (OT)  Anticipated Discharge Disposition (OT): inpatient rehabilitation facility  Therapy Frequency (OT Eval): daily(mon-fri)  Plan of Care Review  Plan of Care Reviewed With: patient  Plan of Care Reviewed With: patient  Outcome Summary: Pt seen for OT evaluation today.  Pt presents with weakness and decreased independence with self care and functional mobility tasks.  Pt is expected to benefit from skilled OT to improve her independence with ADL tasks.    Outcome Measures     Row Name 02/08/19 0924             How much help from another is currently needed...    Putting on and taking off regular lower body clothing?  3  -AH      Bathing (including washing, rinsing, and drying)  3  -AH      Toileting (which includes using toilet bed pan or urinal)  3  -AH      Putting on and taking off regular upper body clothing  3  -AH      Taking care of personal grooming (such as brushing teeth)  3  -AH      Eating meals  3  -      Score  18  -         Functional Assessment    Outcome Measure Options   AM-PAC 6 Clicks Daily Activity (OT)  -        User Key  (r) = Recorded By, (t) = Taken By, (c) = Cosigned By    Initials Name Provider Type    Deb Constantino Occupational Therapist          Time Calculation:   Time Calculation- OT     Row Name 02/08/19 1339             Time Calculation- OT    OT Start Time  0924  -      OT Received On  02/08/19  -      OT Goal Re-Cert Due Date  02/18/19  -        User Key  (r) = Recorded By, (t) = Taken By, (c) = Cosigned By    Initials Name Provider Type    Deb Constantino Occupational Therapist        Therapy Suggested Charges     Code   Minutes Charges    None           Therapy Charges for Today     Code Description Service Date Service Provider Modifiers Qty    98455632298 HC OT EVAL LOW COMPLEXITY 4 2/8/2019 Deb Blum GO 1               Deb Blum  2/8/2019

## 2019-02-08 NOTE — PROGRESS NOTES
"      [facility]  PROGRESS NOTE    Name:  Maria Dolores Campa   Age:  78 y.o.  Sex:  female  :  1940  MRN:  [id number]   Visit Number:  [id number]  Admission Date:  2019  Date Of Service:  19  Primary Care Physician:  Gregorio Jackson MD     LOS: 1 day :  Patient Care Team:  Gregorio Jackson MD as PCP - General  Gregorio Jackson MD as PCP - Claims Attributed:    Chief Complaint:      \" My left shoulder doesn't hurt all that much\"    Subjective / Interval History:     Patient is status post day #1 elective left reverse shoulder arthroplasty.  She tolerated her surgery well.  She states her pain is very minimal at this time to the left shoulder.  She denies chest pain, shortness of breath, dizziness or abdominal pain.  She explains that she is hopeful she will get a rehabilitation bed upon discharge    Review of Systems:     General ROS: No fever spike, no chills or loss of consciousness.  Ophthalmic ROS: no acute visual disturbance.  ENT ROS: No sinus congestion or sore throat.   Respiratory ROS: No shortness of breath.   Cardiovascular ROS: No chest pain or palpitations.  Gastrointestinal ROS: No acute abdominal change. Non-distended.  Genito-Urinary ROS: No reported dysuria or hematuria.  Musculoskeletal ROS: No deep calf pain. No acute focal motor deficit  Neurological ROS: No cyanosis, no new numbness or tingling.  Dermatological ROS: No erythema.  No rash or pruritis.    Vital Signs:    Temp:  [97.3 °F (36.3 °C)-98.3 °F (36.8 °C)] 98.2 °F (36.8 °C)  Heart Rate:  [] 93  Resp:  [14-18] 18  BP: ()/(56-81) 104/56    Intake and output:    I/O last 3 completed shifts:  In: 2120 [P.O.:600; I.V.:1520]  Out: 250 [Urine:150; Blood:100]  No intake/output data recorded.    Physical Examination:    General Appearance:    Alert and cooperative, no acute distress.   Head:    Atraumatic and normocephalic, without obvious abnormality.   Eyes:     Extraocular movements " are within normal limits.   ENT:   No acute change.   Neck:   Supple, trachea midline.   Lungs:     Normal respirations, unlabored.    Heart:    Regular rate.   Abdomen:     Soft non-tender, non-distended.   Extremities:   No new motor deficit, no calf pain, Rochelle sign negative.   Skin:     No rash.  No cyanosis.  No erythema.  No active drainage.  With contact precautions, sterile dressing change done.   Neurologic:   Sensation intact, pulses intact.     Laboratory results:    Lab Results (last 24 hours)     Procedure Component Value Units Date/Time    Basic Metabolic Panel [[id number]]  (Abnormal) Collected:  02/08/19 0511    Specimen:  Blood Updated:  02/08/19 0625     Glucose 92 mg/dL      BUN 26 mg/dL      Creatinine 0.60 mg/dL      Sodium 138 mmol/L      Potassium 4.3 mmol/L      Chloride 106 mmol/L      CO2 26.0 mmol/L      Calcium 8.3 mg/dL      eGFR Non African Amer 97 mL/min/1.73      BUN/Creatinine Ratio 43.3     Anion Gap 10.3 mmol/L     Narrative:       The MDRD GFR formula is only valid for adults with stable renal function between ages 18 and 70.    CBC & Differential [[id number]] Collected:  02/08/19 0511    Specimen:  Blood Updated:  02/08/19 0613    Narrative:       The following orders were created for panel order CBC & Differential.  Procedure                               Abnormality         Status                     ---------                               -----------         ------                     CBC Auto Differential[[id number]]        Abnormal            Final result                 Please view results for these tests on the individual orders.    CBC Auto Differential [[id number]]  (Abnormal) Collected:  02/08/19 0511    Specimen:  Blood Updated:  02/08/19 0613     WBC 6.88 10*3/mm3      RBC 3.48 10*6/mm3      Hemoglobin 11.1 g/dL      Hematocrit 34.9 %      .3 fL      MCH 31.9 pg      MCHC 31.8 g/dL      RDW 12.6 %      RDW-SD 46.3 fl      MPV 10.8 fL      Platelets 208 10*3/mm3       Neutrophil % 66.4 %      Lymphocyte % 21.4 %      Monocyte % 11.2 %      Eosinophil % 0.4 %      Basophil % 0.3 %      Immature Grans % 0.3 %      Neutrophils, Absolute 4.57 10*3/mm3      Lymphocytes, Absolute 1.47 10*3/mm3      Monocytes, Absolute 0.77 10*3/mm3      Eosinophils, Absolute 0.03 10*3/mm3      Basophils, Absolute 0.02 10*3/mm3      Immature Grans, Absolute 0.02 10*3/mm3      nRBC 0.0 /100 WBC     Tissue Pathology Exam [[id number]] Collected:  02/07/19 1017    Specimen:  Bone from Shoulder, Left Updated:  02/07/19 1310          I have reviewed the patient's laboratory results.    Radiology results:    Imaging Results (last 24 hours)     Procedure Component Value Units Date/Time    XR Shoulder 2+ View Left [[id number]] Collected:  02/07/19 1252     Updated:  02/07/19 1305    Narrative:       LEFT SHOULDER     INDICATION: Postoperative assessment.     FINDINGS: 2 views of the left shoulder, compared with 01/16/2019  demonstrate postoperative changes of a reverse shoulder arthroplasty.  Soft tissue gas and skin staples are consistent with the recent surgery.  There does appear to be a residual fracture fragment adjacent to the  proximal humerus. Some questionable irregularity of the periphery of the  scapula/glenoid just superior to the scapular component of the  prosthesis which may also represent fracture fragments. Opacification at  the base of the left chest is likely similar to a portable chest  radiograph dated 01/30/2019 and may represent a combination of effusion  and atelectasis or infiltrate.       Impression:       Postoperative changes of the left shoulder with some  possible residual fracture fragments as detailed above.     This report was finalized on 2/7/2019 12:53 PM by Jared Brothers MD.          I have reviewed the patient's radiology reports.    AP and lateral taken in recovery room shows no acute concern.  Good position and alignment of implants and/or fracture.       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  Continue using the shoulder sling.   No weight bearing to the JULIUS Brown PA-C  02/08/19  12:03 PM

## 2019-02-08 NOTE — PLAN OF CARE
Problem: Patient Care Overview  Goal: Plan of Care Review  Outcome: Ongoing (interventions implemented as appropriate)   02/08/19 8274   Coping/Psychosocial   Plan of Care Reviewed With patient   OTHER   Outcome Summary Pt seen for OT evaluation today. Pt presents with weakness and decreased independence with self care and functional mobility tasks. Pt is expected to benefit from skilled OT to improve her independence with ADL tasks.   Plan of Care Review   Progress no change

## 2019-02-08 NOTE — DISCHARGE PLACEMENT REQUEST
"AMAN Bird RN  Case Management  Phone:  [phone]; Fax: [phone]    PT/OT notes attached.    Maria Dolores Campa (78 y.o. Female)     Date of Birth Social Security Number Address Home Phone MRN    1940  [address]  [facility] [address] [phone] [id number]    [place] Marital Status          Advent        Admission Date Admission Type Admitting Provider Attending Provider Department, Room/Bed    19 Elective Eldon Valadez MD Cervoni, Thomas D., MD [facility] MED SURG      Discharge Date Discharge Disposition Discharge Destination                       Attending Provider:  Eldon Valadez MD    Allergies:  No Known Allergies    Isolation:  None   Infection:  None   Code Status:  CPR    Ht:  160 cm (63\")   Wt:  52.3 kg (115 lb 6.4 oz)    Admission Cmt:  None   Principal Problem:  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                 Active Insurance as of 2019     Primary Coverage     Payor Plan Insurance Group Employer/Plan Group    MEDICARE MEDICARE A & B      Payor Plan Address Payor Plan Phone Number Payor Plan Fax Number Effective Dates    [address] [phone]  1997 - None Entered    [place] [address]       Subscriber Name Subscriber Birth Date Member ID       MARIA DOLORES CAMPA 1940 [id number]                 Emergency Contacts      (Rel.) Home Phone Work Phone Mobile Phone    Drew Campa (Spouse) [phone] -- --    HowiePerlitachristina (Daughter) [phone] -- --               Physician Progress Notes (last 24 hours) (Notes from 2019  3:06 PM through 2019  3:06 PM)      Hector Brown PA-C at 2019 12:02 PM                [facility]  PROGRESS NOTE    Name:  Maria Dolores Campa   Age:  78 y.o.  Sex:  female  :  1940  MRN:  [id number]   Visit Number:  [id number]  Admission Date:  2019  Date Of " "Service:  02/08/19  Primary Care Physician:  Gregorio Jackson MD     LOS: 1 day :  Patient Care Team:  Gregorio Jackson MD as PCP - General  Gregorio Jackson MD as PCP - Claims Attributed:    Chief Complaint:      \" My left shoulder doesn't hurt all that much\"    Subjective / Interval History:     Patient is status post day #1 elective left reverse shoulder arthroplasty.  She tolerated her surgery well.  She states her pain is very minimal at this time to the left shoulder.  She denies chest pain, shortness of breath, dizziness or abdominal pain.  She explains that she is hopeful she will get a rehabilitation bed upon discharge    Review of Systems:     General ROS: No fever spike, no chills or loss of consciousness.  Ophthalmic ROS: no acute visual disturbance.  ENT ROS: No sinus congestion or sore throat.   Respiratory ROS: No shortness of breath.   Cardiovascular ROS: No chest pain or palpitations.  Gastrointestinal ROS: No acute abdominal change. Non-distended.  Genito-Urinary ROS: No reported dysuria or hematuria.  Musculoskeletal ROS: No deep calf pain. No acute focal motor deficit  Neurological ROS: No cyanosis, no new numbness or tingling.  Dermatological ROS: No erythema.  No rash or pruritis.    Vital Signs:    Temp:  [97.3 °F (36.3 °C)-98.3 °F (36.8 °C)] 98.2 °F (36.8 °C)  Heart Rate:  [] 93  Resp:  [14-18] 18  BP: ()/(56-81) 104/56    Intake and output:    I/O last 3 completed shifts:  In: 2120 [P.O.:600; I.V.:1520]  Out: 250 [Urine:150; Blood:100]  No intake/output data recorded.    Physical Examination:    General Appearance:    Alert and cooperative, no acute distress.   Head:    Atraumatic and normocephalic, without obvious abnormality.   Eyes:     Extraocular movements are within normal limits.   ENT:   No acute change.   Neck:   Supple, trachea midline.   Lungs:     Normal respirations, unlabored.    Heart:    Regular rate.   Abdomen:     Soft non-tender, non-distended. "   Extremities:   No new motor deficit, no calf pain, Rochelle sign negative.   Skin:     No rash.  No cyanosis.  No erythema.  No active drainage.  With contact precautions, sterile dressing change done.   Neurologic:   Sensation intact, pulses intact.     Laboratory results:    Lab Results (last 24 hours)     Procedure Component Value Units Date/Time    Basic Metabolic Panel [[id number]]  (Abnormal) Collected:  02/08/19 0511    Specimen:  Blood Updated:  02/08/19 0625     Glucose 92 mg/dL      BUN 26 mg/dL      Creatinine 0.60 mg/dL      Sodium 138 mmol/L      Potassium 4.3 mmol/L      Chloride 106 mmol/L      CO2 26.0 mmol/L      Calcium 8.3 mg/dL      eGFR Non African Amer 97 mL/min/1.73      BUN/Creatinine Ratio 43.3     Anion Gap 10.3 mmol/L     Narrative:       The MDRD GFR formula is only valid for adults with stable renal function between ages 18 and 70.    CBC & Differential [[id number]] Collected:  02/08/19 0511    Specimen:  Blood Updated:  02/08/19 0613    Narrative:       The following orders were created for panel order CBC & Differential.  Procedure                               Abnormality         Status                     ---------                               -----------         ------                     CBC Auto Differential[[id number]]        Abnormal            Final result                 Please view results for these tests on the individual orders.    CBC Auto Differential [[id number]]  (Abnormal) Collected:  02/08/19 0511    Specimen:  Blood Updated:  02/08/19 0613     WBC 6.88 10*3/mm3      RBC 3.48 10*6/mm3      Hemoglobin 11.1 g/dL      Hematocrit 34.9 %      .3 fL      MCH 31.9 pg      MCHC 31.8 g/dL      RDW 12.6 %      RDW-SD 46.3 fl      MPV 10.8 fL      Platelets 208 10*3/mm3      Neutrophil % 66.4 %      Lymphocyte % 21.4 %      Monocyte % 11.2 %      Eosinophil % 0.4 %      Basophil % 0.3 %      Immature Grans % 0.3 %      Neutrophils, Absolute 4.57 10*3/mm3      Lymphocytes,  Absolute 1.47 10*3/mm3      Monocytes, Absolute 0.77 10*3/mm3      Eosinophils, Absolute 0.03 10*3/mm3      Basophils, Absolute 0.02 10*3/mm3      Immature Grans, Absolute 0.02 10*3/mm3      nRBC 0.0 /100 WBC     Tissue Pathology Exam [[id number]] Collected:  02/07/19 1017    Specimen:  Bone from Shoulder, Left Updated:  02/07/19 1310          I have reviewed the patient's laboratory results.    Radiology results:    Imaging Results (last 24 hours)     Procedure Component Value Units Date/Time    XR Shoulder 2+ View Left [[id number]] Collected:  02/07/19 1252     Updated:  02/07/19 1305    Narrative:       LEFT SHOULDER     INDICATION: Postoperative assessment.     FINDINGS: 2 views of the left shoulder, compared with 01/16/2019  demonstrate postoperative changes of a reverse shoulder arthroplasty.  Soft tissue gas and skin staples are consistent with the recent surgery.  There does appear to be a residual fracture fragment adjacent to the  proximal humerus. Some questionable irregularity of the periphery of the  scapula/glenoid just superior to the scapular component of the  prosthesis which may also represent fracture fragments. Opacification at  the base of the left chest is likely similar to a portable chest  radiograph dated 01/30/2019 and may represent a combination of effusion  and atelectasis or infiltrate.       Impression:       Postoperative changes of the left shoulder with some  possible residual fracture fragments as detailed above.     This report was finalized on 2/7/2019 12:53 PM by Jared Brothers MD.          I have reviewed the patient's radiology reports.    AP and lateral taken in recovery room shows no acute concern.  Good position and alignment of implants and/or fracture.       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  Continue using the shoulder sling.   No weight bearing to the JUNIEE  Hetcor Brown PA-C  19  12:03 PM    Electronically signed by Hector Brown PA-C at 2019 12:06 PM          Physical Therapy Notes (last 24 hours) (Notes from 2019  3:06 PM through 2019  3:06 PM)      Nila Torrez, PT at 2019  3:43 PM  Version 1 of 19 1533   Rehab Time/Intention   Evaluation Not Performed patient/family declined evaluation  (Pt adamantly states she has had too many people in and out of her room today asking questions and she is not participating with PT eval today. PT will follow up with patient tomorrow and proceed with eval as tolerated and appropriate.)   Rehab Treatment   Discipline physical therapist       Electronically signed by Nila Torrez, PT at 2019  3:43 PM     Nila Torrez PT at 2019  1:40 PM  Version 1 of 1         Problem: Patient Care Overview  Goal: Plan of Care Review  Outcome: Ongoing (interventions implemented as appropriate)   19 1340   Coping/Psychosocial   Plan of Care Reviewed With patient   OTHER   Outcome Summary PT eval completed. Patient presents with decreased strength, balance, endurance and independence. She is expected to benefit from continued skilled PT intervention to improve her mobility status prior to D/C.           Electronically signed by Nila Torrez PT at 2019  1:40 PM     Nila Torrez PT at 2019  1:41 PM  Version 1 of 1         Acute Care - Physical Therapy Initial Evaluation  [facility]     Patient Name: Maria Dolores Campa  : 1940  MRN: [id number]  Today's Date: 2019   Onset of Illness/Injury or Date of Surgery:  "02/07/19  Date of Referral to PT: 02/07/19  Referring Physician: Dr. Valadez      Admit Date: 2/7/2019    Visit Dx:     ICD-10-CM ICD-9-CM   1. Impaired mobility and ADLs Z74.09 799.89   2. 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 Impaired functional mobility, balance, gait, and endurance Z74.09 V49.89     Patient Active Problem List   Diagnosis   • Urinary tract infection without hematuria   • Acute exacerbation of chronic obstructive pulmonary disease (COPD) (CMS/[facility])   • Moderate malnutrition (CMS/[facility])   • Acute metabolic encephalopathy   • MS (multiple sclerosis) (CMS/[facility])   • Tobacco abuse   • COPD (chronic obstructive pulmonary disease) (CMS/[facility])   • Fx humeral neck, left, sequela   • Declining functional status   • Frequent falls   • Dehydration   • 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 PATIENT REPORTS \"CLEAR PHLEM\" WITH NO FEVER AND STATED \"IT'S JUST A SMOKERS COUGH\"   • Full dentures     INSTRUCTED NO ADHESIVES THE DOS   • GERD (gastroesophageal reflux disease)     REPORTS ONLY INTERMITTENT EPISODES   • Hearing loss     PATIENT REPORTS AGE RELATED. NO USE OF HEARING AIDS.    • History of fracture     REPORTS HISTORY OF FRACTURED PELVIS.  REPORTS FALL EARLY JAN 2019 RESULTING IN FRACTURED LEFT SHOULDER.   • History of pneumonia     REPORTS MULTIPLE TIMES   • History of transfusion     REPORTS AS A TEEN AND THAT SHE DID NOT HAVE ANY REACTIONS TO TRANSFUSION   • Hypotension     REPORTS \"MY BLOOD PRESSURE IS ALWAYS LOW - I HAVE NEVER BEEN ABLE TO DONATE BLOOD\"   • Impaired mobility     RELATED TO MULTIPLE SCLEROSIS   • MS (multiple sclerosis) (CMS/[facility])    • Tattoo    • Wears glasses      Past Surgical History:   Procedure Laterality Date "   • APPENDECTOMY      REPORTS SHE THINKS SHE HAD APPENDIX REMOVED WHEN GALLBLADDER WAS REMOVED.   • CHOLECYSTECTOMY     • EYE SURGERY      Corneal implant, PATIENT REPORTS SHE IS UNSURE LATERALITY   • MOUTH SURGERY      FULL MOUTH EXTRACTION   • TOTAL SHOULDER ARTHROPLASTY Left 2/7/2019    Procedure: Left reverse total shoulder arthroplasty;  Surgeon: Eldon Valadez MD;  Location: [facility];  Service: Orthopedics        PT ASSESSMENT (last 12 hours)      Physical Therapy Evaluation     Row Name 02/08/19 0921          PT Evaluation Time/Intention    Subjective Information  complains of;pain  -LM     Document Type  evaluation  -LM     Mode of Treatment  physical therapy  -LM     Patient Effort  good  -LM     Symptoms Noted During/After Treatment  none  -LM     Row Name 02/08/19 0921          General Information    Patient Profile Reviewed?  yes  -LM     Onset of Illness/Injury or Date of Surgery  02/07/19  -LM     Referring Physician  Rory  -LM     Patient Observations  alert;cooperative;agree to therapy  -LM     Patient/Family Observations  Pt alone in room.  -LM     General Observations of Patient  Pt received supine in bed.2 LPM O2 per n/c.  -LM     Prior Level of Function  independent:;all household mobility  -LM     Equipment Currently Used at Home  power chair, (recliner lift);commode, bedside;rollator;shower chair;wheelchair;other (see comments) stair lift  -LM     Pertinent History of Current Functional Problem  s/p L reverse TSA;MS,COPD,GERD  -LM     Existing Precautions/Restrictions  fall;non-weight bearing;left;shoulder  -LM     Risks Reviewed  patient:;increased discomfort  -LM     Benefits Reviewed  patient:;improve function;increase independence  -LM     Row Name 02/08/19 0921          Relationship/Environment    Lives With  spouse  -LM     Row Name 02/08/19 0921          Resource/Environmental Concerns    Current Living Arrangements  home/apartment/condo  -LM     Row Name 02/08/19 0921           Home Main Entrance    Number of Stairs, Main Entrance  two  -LM     Row Name 02/08/19 0921          Stairs Within Home, Primary    Stairs, Within Home, Primary  To basement  -LM     Stairs Comment, Within Home, Primary  Has stair lift.  -LM     Row Name 02/08/19 0921          Cognitive Assessment/Intervention- PT/OT    Orientation Status (Cognition)  oriented x 4  -LM     Follows Commands (Cognition)  WFL  -LM     Safety Deficit (Cognitive)  impulsivity;safety precautions follow-through/compliance;safety precautions awareness;insight into deficits/self awareness;awareness of need for assistance  -     Row Name 02/08/19 0921          Safety Issues, Functional Mobility    Safety Issues Affecting Function (Mobility)  awareness of need for assistance;impulsivity;insight into deficits/self awareness;safety precaution awareness;safety precautions follow-through/compliance  -     Row Name 02/08/19 0921          Mobility Assessment/Treatment    Extremity Weight-bearing Status  left upper extremity  -LM     Left Upper Extremity (Weight-bearing Status)  non weight-bearing (NWB)  -     Row Name 02/08/19 0921          Bed Mobility Assessment/Treatment    Bed Mobility Assessment/Treatment  supine-sit  -LM     Supine-Sit [place] (Bed Mobility)  contact guard  -     Row Name 02/08/19 0921          Transfer Assessment/Treatment    Transfer Assessment/Treatment  sit-stand transfer;stand-sit transfer  -LM     Sit-Stand [place] (Transfers)  minimum assist (75% patient effort)  -LM     Stand-Sit [place] (Transfers)  minimum assist (75% patient effort)  -LM     [place] Level (Toilet Transfer)  minimum assist (75% patient effort)  -LM     Assistive Device (Toilet Transfer)  commode, bedside without drop arms;other (see comments) HHA  -LM     Row Name 02/08/19 0921          Sit-Stand Transfer    Assistive Device (Sit-Stand Transfers)  other (see comments) HHA  -     Row Name 02/08/19 0921          Stand-Sit  Transfer    Assistive Device (Stand-Sit Transfers)  other (see comments) HHA  -LM     Row Name 02/08/19 0921          Toilet Transfer    Type (Toilet Transfer)  sit-stand;stand-sit  -LM     Row Name 02/08/19 0921          Gait/Stairs Assessment/Training    Gait/Stairs Assessment/Training  gait/ambulation independence  -LM     [place] Level (Gait)  other (see comments);minimum assist (75% patient effort)  -LM     Assistive Device (Gait)  other (see comments) HHA  -LM     Distance in Feet (Gait)  12  -LM     Pattern (Gait)  step-to  -LM     Deviations/Abnormal Patterns (Gait)  base of support, narrow;senthil decreased;gait speed decreased;stride length decreased  -LM     Bilateral Gait Deviations  heel strike decreased;weight shift ability decreased  -LM     Row Name 02/08/19 0921          General ROM    GENERAL ROM COMMENTS  L UE limited  -LM     Row Name 02/08/19 0921          MMT (Manual Muscle Testing)    General MMT Comments  L UE n/t.  -LM     Row Name 02/08/19 0921          Pain Assessment    Additional Documentation  Pain Scale: Numbers Pre/Post-Treatment (Group)  -LM     Row Name 02/08/19 0921          Pain Scale: Numbers Pre/Post-Treatment    Pain Scale: Numbers, Pretreatment  2/10  -LM     Pain Scale: Numbers, Post-Treatment  0/10 - no pain  -LM     Pain Location - Side  Left  -LM     Pain Location - Orientation  upper  -LM     Pain Location  extremity  -LM     Pain Intervention(s)  Repositioned;Ambulation/increased activity  -LM     Row Name             Wound 02/07/19 0917 Left shoulder incision    Wound - Properties Group Date first assessed: 02/07/19  -JS Time first assessed: 0917  -JS Side: Left  -JS Location: shoulder  -JS Type: incision  -JS    Row Name 02/08/19 0921          Coping    Observed Emotional State  accepting;cooperative  -LM     Verbalized Emotional State  acceptance  -LM     Row Name 02/08/19 0921          Plan of Care Review    Plan of Care Reviewed With  patient  -LM     Row Name  02/08/19 0921          Physical Therapy Clinical Impression    Date of Referral to PT  02/07/19  -LM     PT Diagnosis (PT Clinical Impression)  Generalized weakness; difficulty with ambulation  -LM     Patient/Family Goals Statement (PT Clinical Impression)  Return home.  -LM     Criteria for Skilled Interventions Met (PT Clinical Impression)  yes;treatment indicated  -LM     Rehab Potential (PT Clinical Summary)  good, to achieve stated therapy goals  -LM     Care Plan Review (PT)  evaluation/treatment results reviewed;care plan/treatment goals reviewed;risks/benefits reviewed;current/potential barriers reviewed;patient/other agree to care plan  -LM     Row Name 02/08/19 0921          Vital Signs    O2 Delivery Pre Treatment  supplemental O2 2 LPM  -LM     O2 Delivery Intra Treatment  room air  -LM     O2 Delivery Post Treatment  supplemental O2 2 LPM  -LM     Row Name 02/08/19 0921          Physical Therapy Goals    Bed Mobility Goal Selection (PT)  bed mobility, PT goal 1  -LM     Transfer Goal Selection (PT)  transfer, PT goal 1  -LM     Gait Training Goal Selection (PT)  gait training, PT goal 1  -LM     Row Name 02/08/19 0921          Bed Mobility Goal 1 (PT)    Activity/Assistive Device (Bed Mobility Goal 1, PT)  sit to supine/supine to sit;bed rails  -LM     [place] Level/Cues Needed (Bed Mobility Goal 1, PT)  conditional independence  -LM     Time Frame (Bed Mobility Goal 1, PT)  2 weeks  -LM     Progress/Outcomes (Bed Mobility Goal 1, PT)  goal ongoing  -LM     Row Name 02/08/19 0921          Transfer Goal 1 (PT)    Activity/Assistive Device (Transfer Goal 1, PT)  sit-to-stand/stand-to-sit;bed-to-chair/chair-to-bed;cane, quad  -LM     [place] Level/Cues Needed (Transfer Goal 1, PT)  contact guard assist  -LM     Time Frame (Transfer Goal 1, PT)  2 weeks  -LM     Progress/Outcome (Transfer Goal 1, PT)  goal ongoing  -LM     Row Name 02/08/19 0921          Gait Training Goal 1 (PT)     Activity/Assistive Device (Gait Training Goal 1, PT)  gait (walking locomotion);assistive device use;cane, quad  -LM     [place] Level (Gait Training Goal 1, PT)  contact guard assist  -LM     Distance (Gait Goal 1, PT)  50  -LM     Time Frame (Gait Training Goal 1, PT)  2 weeks  -LM     Progress/Outcome (Gait Training Goal 1, PT)  goal ongoing  -LM     Row Name 02/08/19 0921          Patient Education Goal (PT)    Activity (Patient Education Goal, PT)  Pt will perform B LE ther ex x 15 reps.  -LM     [place]/Cues/Accuracy (Memory Goal 2, PT)  demonstrates adequately;verbalizes understanding  -LM     Time Frame (Patient Education Goal, PT)  2 weeks  -LM     Progress/Outcome (Patient Education Goal, PT)  goal ongoing  -LM     Row Name 02/08/19 0921          Positioning and Restraints    Pre-Treatment Position  in bed  -LM     Post Treatment Position  chair  -LM     In Chair  reclined;call light within reach;encouraged to call for assist  -LM     Row Name 02/08/19 0921          Living Environment    Home Accessibility  stairs within home;stairs to enter home  -LM       User Key  (r) = Recorded By, (t) = Taken By, (c) = Cosigned By    Initials Name Provider Type    Argelia Dye RN Registered Nurse    Nila Minaya, PT Physical Therapist        Physical Therapy Education     Title: PT OT SLP Therapies (In Progress)     Topic: Physical Therapy (Done)     Point: Mobility training (Done)     Learning Progress Summary           Patient Acceptance, E,TB, VU by LM at 2/8/2019  1:40 PM    Comment:  Purpose of PT/POC.                   Point: Home exercise program (Done)     Learning Progress Summary           Patient Acceptance, E,TB, VU by LM at 2/8/2019  1:40 PM    Comment:  Purpose of PT/POC.                   Point: Precautions (Done)     Learning Progress Summary           Patient Acceptance, E,TB, VU by LM at 2/8/2019  1:40 PM    Comment:  Purpose of PT/POC.                               User Key      Initials Effective Dates Name Provider Type Discipline    LM 04/03/18 -  Nila Torrez, PT Physical Therapist PT              PT Recommendation and Plan  Anticipated Discharge Disposition (PT): inpatient rehabilitation facility  Planned Therapy Interventions (PT Eval): balance training, bed mobility training, gait training, home exercise program, patient/family education, strengthening, transfer training  Therapy Frequency (PT Clinical Impression): daily  Outcome Summary/Treatment Plan (PT)  Anticipated Discharge Disposition (PT): inpatient rehabilitation facility  Plan of Care Reviewed With: patient  Outcome Summary: PT eval completed. Patient presents with decreased strength, balance, endurance and independence. She is expected to benefit from continued skilled PT intervention to improve her mobility status prior to D/C.  Outcome Measures     Row Name 02/08/19 0924 02/08/19 0921          How much help from another person do you currently need...    Turning from your back to your side while in flat bed without using bedrails?  --  3  -LM     Moving from lying on back to sitting on the side of a flat bed without bedrails?  --  3  -LM     Moving to and from a bed to a chair (including a wheelchair)?  --  3  -LM     Standing up from a chair using your arms (e.g., wheelchair, bedside chair)?  --  3  -LM     Climbing 3-5 steps with a railing?  --  1  -LM     To walk in hospital room?  --  3  -LM     AM-PAC 6 Clicks Score  --  16  -LM        How much help from another is currently needed...    Putting on and taking off regular lower body clothing?  3  -AH  --     Bathing (including washing, rinsing, and drying)  3  -AH  --     Toileting (which includes using toilet bed pan or urinal)  3  -AH  --     Putting on and taking off regular upper body clothing  3  -AH  --     Taking care of personal grooming (such as brushing teeth)  3  -AH  --     Eating meals  3  -AH  --     Score  18  -AH  --        Functional Assessment     Outcome Measure Options  AM-PAC 6 Clicks Daily Activity (OT)  -  AM-PAC 6 Clicks Basic Mobility (PT)  -       User Key  (r) = Recorded By, (t) = Taken By, (c) = Cosigned By    Initials Name Provider Type     Deb Blum Occupational Therapist    LM Nila Thomas, PT Physical Therapist         Time Calculation:   PT Charges     Row Name 19 1341             Time Calculation    Start Time  921  -LM      PT Received On  19  -      PT Goal Re-Cert Due Date  19  -        User Key  (r) = Recorded By, (t) = Taken By, (c) = Cosigned By    Initials Name Provider Type     Nila Thomas, LIBIA Physical Therapist        Therapy Suggested Charges     Code   Minutes Charges    None           Therapy Charges for Today     Code Description Service Date Service Provider Modifiers Qty    [id number] HC PT EVAL MOD COMPLEXITY 4 2019 Nila Thomas, PT GP 1          PT G-Codes  Outcome Measure Options: AM-PAC 6 Clicks Daily Activity (OT)  AM-PAC 6 Clicks Score: 16  Score: 18      Nila Thomas PT  2019         Electronically signed by Nila Thomas PT at 2019  1:42 PM          Occupational Therapy Notes (last 24 hours) (Notes from 2019  3:06 PM through 2019  3:06 PM)      Deb Blum at 2019  1:39 PM          Acute Care - Occupational Therapy Initial Evaluation  [facility]     Patient Name: Maria Dolores Campa  : 1940  MRN: [id number]  Today's Date: 2019  Onset of Illness/Injury or Date of Surgery: 19  Date of Referral to OT: 19  Referring Physician: Dr. Valadez    Admit Date: 2019       ICD-10-CM ICD-9-CM   1. Impaired mobility and ADLs Z74.09 799.89   2. 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 PATIENT REPORTS \"CLEAR PHLEM\" WITH NO FEVER AND STATED \"IT'S JUST A SMOKERS COUGH\"   • Full dentures     INSTRUCTED NO ADHESIVES THE DOS   • GERD (gastroesophageal reflux disease)     REPORTS ONLY INTERMITTENT EPISODES   • Hearing loss     PATIENT REPORTS AGE RELATED. NO USE OF HEARING AIDS.    • History of fracture     REPORTS HISTORY OF FRACTURED PELVIS.  REPORTS FALL EARLY JAN 2019 RESULTING IN FRACTURED LEFT SHOULDER.   • History of pneumonia     REPORTS MULTIPLE TIMES   • History of transfusion     REPORTS AS A TEEN AND THAT SHE DID NOT HAVE ANY REACTIONS TO TRANSFUSION   • Hypotension     REPORTS \"MY BLOOD PRESSURE IS ALWAYS LOW - I HAVE NEVER BEEN ABLE TO DONATE BLOOD\"   • Impaired mobility     RELATED TO MULTIPLE SCLEROSIS   • MS (multiple sclerosis) (CMS/[facility])    • Tattoo    • Wears glasses      Past Surgical History:   Procedure Laterality Date   • APPENDECTOMY      REPORTS SHE THINKS SHE HAD APPENDIX REMOVED WHEN GALLBLADDER WAS REMOVED.   • CHOLECYSTECTOMY     • EYE SURGERY      Corneal implant, PATIENT REPORTS SHE IS UNSURE LATERALITY   • MOUTH SURGERY      FULL MOUTH EXTRACTION   • TOTAL SHOULDER ARTHROPLASTY Left 2/7/2019    Procedure: Left reverse total shoulder arthroplasty;  Surgeon: Eldon Valadez MD;  Location: [facility];  Service: Orthopedics          OT ASSESSMENT FLOWSHEET (last 72 hours)    "   Occupational Therapy Evaluation     Row Name 02/08/19 1300                   OT Evaluation Time/Intention    Subjective Information  complains of;pain  -        Document Type  evaluation  -        Mode of Treatment  occupational therapy  -        Patient Effort  good  -        Symptoms Noted During/After Treatment  none  -           General Information    Patient Profile Reviewed?  yes  -        Onset of Illness/Injury or Date of Surgery  02/07/19  -        Referring Physician  Dr. Valadez  -        Patient Observations  alert;cooperative;agree to therapy  -        Patient/Family Observations  Pt alone  -        General Observations of Patient  Pt received supine in bed on 2L  -        Prior Level of Function  independent:;all household mobility;ADL's prior to fall ~ 1 month ago  -        Equipment Currently Used at Home  power chair, (recliner lift);shower chair;rollator;commode, bedside;wheelchair stair lift  -        Pertinent History of Current Functional Problem  closed fracture of proximal humerus, s/p L reverse total shoulder  -        Existing Precautions/Restrictions  fall;non-weight bearing;left;shoulder  -        Risks Reviewed  patient:;increased discomfort  -        Benefits Reviewed  patient:;improve function;increase independence;increase strength  -           Relationship/Environment    Lives With  spouse  -           Resource/Environmental Concerns    Current Living Arrangements  home/apartment/condo  -           Home Main Entrance    Number of Stairs, Main Entrance  two  -           Stairs Within Home, Primary    Stairs, Within Home, Primary  to basement  -        Stairs Comment, Within Home, Primary  has stair chair lift  -           Cognitive Assessment/Intervention- PT/OT    Orientation Status (Cognition)  oriented x 4  -        Follows Commands (Cognition)  WFL  -        Safety Deficit (Cognitive)  impulsivity;safety precautions awareness;safety  precautions follow-through/compliance  -           Safety Issues, Functional Mobility    Safety Issues Affecting Function (Mobility)  impulsivity;safety precaution awareness;safety precautions follow-through/compliance  -           Mobility Assessment/Treatment    Extremity Weight-bearing Status  left upper extremity  -        Left Upper Extremity (Weight-bearing Status)  non weight-bearing (NWB)  -           Bed Mobility Assessment/Treatment    Bed Mobility Assessment/Treatment  supine-sit  -        Supine-Sit [place] (Bed Mobility)  contact guard  -           Functional Mobility    Functional Mobility- Ind. Level  minimum assist (75% patient effort)  -        Functional Mobility- Device  other (see comments) [facility]        Functional Mobility-Distance (Feet)  12  -        Functional Mobility- Safety Issues  balance decreased during turns  -           Transfer Assessment/Treatment    Transfer Assessment/Treatment  sit-stand transfer;stand-sit transfer;toilet transfer  -           Sit-Stand Transfer    Sit-Stand [place] (Transfers)  minimum assist (75% patient effort)  -        Assistive Device (Sit-Stand Transfers)  other (see comments) [facility]           Stand-Sit Transfer    Stand-Sit [place] (Transfers)  minimum assist (75% patient effort)  -        Assistive Device (Stand-Sit Transfers)  other (see comments) [facility]           Toilet Transfer    Type (Toilet Transfer)  sit-stand;stand-sit  -        [place] Level (Toilet Transfer)  minimum assist (75% patient effort)  -        Assistive Device (Toilet Transfer)  commode, bedside without drop arms  -           ADL Assessment/Intervention    BADL Assessment/Intervention  bathing;upper body dressing;lower body dressing;grooming;feeding;toileting  -           Bathing Assessment/Intervention    Bathing [place] Level  minimum assist (75% patient effort)  -           Upper Body Dressing Assessment/Training     Upper Body Dressing [place] Level  minimum assist (75% patient effort)  -           Lower Body Dressing Assessment/Training    Lower Body Dressing [place] Level  minimum assist (75% patient effort)  -           Grooming Assessment/Training    [place] Level (Grooming)  minimum assist (75% patient effort)  -           Self-Feeding Assessment/Training    [place] Level (Feeding)  set up  -           Toileting Assessment/Training    [place] Level (Toileting)  minimum assist (75% patient effort)  -           BADL Safety/Performance    Impairments, BADL Safety/Performance  range of motion;pain;strength;endurance/activity tolerance  -           General ROM    GENERAL ROM COMMENTS  RUE WFL, LUE limited d/t recent shoulder sx  -           MMT (Manual Muscle Testing)    General MMT Comments  RUE 4/5, LUE n/t  -           Positioning and Restraints    Pre-Treatment Position  in bed  -        Post Treatment Position  chair  -        In Chair  reclined;call light within reach;encouraged to call for assist  -           Pain Assessment    Additional Documentation  Pain Scale: Numbers Pre/Post-Treatment (Group)  -           Pain Scale: Numbers Pre/Post-Treatment    Pain Scale: Numbers, Pretreatment  2/10  -        Pain Scale: Numbers, Post-Treatment  0/10 - no pain  -        Pain Location - Side  Left  -        Pain Location - Orientation  upper  -        Pain Location  extremity  -        Pain Intervention(s)  Repositioned;Ambulation/increased activity  -           Wound 02/07/19 0917 Left shoulder incision    Wound - Properties Group Date first assessed: 02/07/19  -JS Time first assessed: 0917  -JS Side: Left  -JS Location: shoulder  -JS Type: incision  -JS       Coping    Observed Emotional State  accepting;cooperative  -        Verbalized Emotional State  acceptance  -           Plan of Care Review    Plan of Care Reviewed With  patient  -           Clinical  Impression (OT)    Date of Referral to OT  02/07/19  -        OT Diagnosis  ADL decline  -        Patient/Family Goals Statement (OT Eval)  Pt wants to d/c home, but is willing to d/c to rehab  -        Criteria for Skilled Therapeutic Interventions Met (OT Eval)  yes;treatment indicated  -        Rehab Potential (OT Eval)  good, to achieve stated therapy goals  -        Therapy Frequency (OT Eval)  daily mon-fri  -        Care Plan Review (OT)  evaluation/treatment results reviewed;patient/other agree to care plan  -        Anticipated Discharge Disposition (OT)  inpatient rehabilitation facility  -           Vital Signs    O2 Delivery Pre Treatment  supplemental O2  -        O2 Delivery Intra Treatment  room air  -        O2 Delivery Post Treatment  supplemental O2  -           OT Goals    Transfer Goal Selection (OT)  transfer, OT goal 1  -        Dressing Goal Selection (OT)  dressing, OT goal 1;dressing, OT goal 2  -        ROM Goal Selection (OT)  ROM, OT goal 1  -        Functional Mobility Goal Selection (OT)  functional mobility, OT goal 1  -        Additional Documentation  ROM Goal Selection (OT) (Row);Functional Mobility Selection (OT) (Row)  -           Transfer Goal 1 (OT)    Activity/Assistive Device (Transfer Goal 1, OT)  sit-to-stand/stand-to-sit;cane, quad  -        [place] Level/Cues Needed (Transfer Goal 1, OT)  contact guard assist  -AH        Time Frame (Transfer Goal 1, OT)  long term goal (LTG);2 weeks  -        Progress/Outcome (Transfer Goal 1, OT)  goal ongoing  -           Dressing Goal 1 (OT)    Activity/Assistive Device (Dressing Goal 1, OT)  lower body dressing  -        [place]/Cues Needed (Dressing Goal 1, OT)  contact guard assist  -AH        Time Frame (Dressing Goal 1, OT)  long term goal (LTG);2 weeks  -        Progress/Outcome (Dressing Goal 1, OT)  goal ongoing  -           Dressing Goal 2 (OT)    Activity/Assistive Device  (Dressing Goal 2, OT)  upper body dressing  -        [place]/Cues Needed (Dressing Goal 2, OT)  contact guard assist  -        Time Frame (Dressing Goal 2, OT)  long term goal (LTG);2 weeks  -        Progress/Outcome (Dressing Goal 2, OT)  goal ongoing  -           ROM Goal 1 (OT)    ROM Goal 1 (OT)  Pt will be independent with HEP for LUE.  -        Time Frame (ROM Goal 1, OT)  long term goal (LTG);2 weeks  -AH        Progress/Outcome (ROM Goal 1, OT)  goal ongoing  -           Functional Mobility Goal 1 (OT)    Activity/Assistive Device (Functional Mobility Goal 1, OT)  cane, quad  -        [place] Level/Cues Needed (Functional Mobility Goal 1, OT)  contact guard assist  -        Distance Goal 1 (Functional Mobility, OT)  100  -        Time Frame (Functional Mobility Goal 1, OT)  long term goal (LTG);2 weeks  -        Progress/Outcome (Functional Mobility Goal 1, OT)  goal ongoing  -           Living Environment    Home Accessibility  stairs to enter home;stairs within home  -          User Key  (r) = Recorded By, (t) = Taken By, (c) = Cosigned By    Initials Name Effective Dates    Deb Constantino 03/07/18 -     JS Argelia Martinez RN 07/14/16 -          Occupational Therapy Education     Title: PT OT SLP Therapies (In Progress)     Topic: Occupational Therapy (In Progress)     Point: ADL training (Done)     Description: Instruct learner(s) on proper safety adaptation and remediation techniques during self care or transfers.   Instruct in proper use of assistive devices.    Learning Progress Summary           Patient Acceptance, E, ANTONI by  at 2/8/2019  1:37 PM    Comment:  Role of OT/POC                   Point: Home exercise program (Done)     Description: Instruct learner(s) on appropriate technique for monitoring, assisting and/or progressing therapeutic exercises/activities.    Learning Progress Summary           Patient Acceptance, E,H, ANTONI,DU,NR by  at 2/8/2019  1:37 PM     Comment:  JULIUS ex                               User Key     Initials Effective Dates Name Provider Type Counts include 234 beds at the [facility] 03/07/18 -  Deb Blum Occupational Therapist OT                  OT Recommendation and Plan  Outcome Summary/Treatment Plan (OT)  Anticipated Discharge Disposition (OT): inpatient rehabilitation facility  Therapy Frequency (OT Eval): daily(mon-fri)  Plan of Care Review  Plan of Care Reviewed With: patient  Plan of Care Reviewed With: patient  Outcome Summary: Pt seen for OT evaluation today.  Pt presents with weakness and decreased independence with self care and functional mobility tasks.  Pt is expected to benefit from skilled OT to improve her independence with ADL tasks.    Outcome Measures     Row Name 02/08/19 0924             How much help from another is currently needed...    Putting on and taking off regular lower body clothing?  3  -AH      Bathing (including washing, rinsing, and drying)  3  -AH      Toileting (which includes using toilet bed pan or urinal)  3  -AH      Putting on and taking off regular upper body clothing  3  -AH      Taking care of personal grooming (such as brushing teeth)  3  -AH      Eating meals  3  -      Score  18  -         Functional Assessment    Outcome Measure Options  AM-PAC 6 Clicks Daily Activity (OT)  -        User Key  (r) = Recorded By, (t) = Taken By, (c) = Cosigned By    Initials Name Provider Type     Deb Blum Occupational Therapist          Time Calculation:   Time Calculation- OT     Row Name 02/08/19 1339             Time Calculation- OT    OT Start Time  0924  -      OT Received On  02/08/19  -      OT Goal Re-Cert Due Date  02/18/19  -        User Key  (r) = Recorded By, (t) = Taken By, (c) = Cosigned By    Initials Name Provider Type     Deb Blum Occupational Therapist        Therapy Suggested Charges     Code   Minutes Charges    None           Therapy Charges for Today     Code Description Service Date  Service Provider Modifiers Qty    [id number]  OT EVAL LOW COMPLEXITY 4 2/8/2019 Deb Blum GO 1               Deb Blum  2/8/2019    Electronically signed by Deb Blum at 2/8/2019  1:39 PM     Deb Blum at 2/8/2019  1:38 PM          Problem: Patient Care Overview  Goal: Plan of Care Review  Outcome: Ongoing (interventions implemented as appropriate)   02/08/19 1337   Coping/Psychosocial   Plan of Care Reviewed With patient   OTHER   Outcome Summary Pt seen for OT evaluation today. Pt presents with weakness and decreased independence with self care and functional mobility tasks. Pt is expected to benefit from skilled OT to improve her independence with ADL tasks.   Plan of Care Review   Progress no change           Electronically signed by Deb Blum at 2/8/2019  1:38 PM     Nitza Alexander OT at 2/7/2019  4:18 PM             02/07/19 1535   Rehab Time/Intention   Evaluation Not Performed patient/family declined evaluation  (Patient states she would prefer to wait until tomorrow. )   Rehab Treatment   Discipline occupational therapist       Electronically signed by Nitza Alexander OT at 2/7/2019  4:18 PM

## 2019-02-08 NOTE — DISCHARGE PLACEMENT REQUEST
"AMAN Bird RN  Case Management  Phone:  [phone]; Fax:  [phone]    New referral for  Rehab.    Maria Dolores Campa (78 y.o. Female)     Date of Birth Social Security Number Address Home Phone MRN    1940  [id number] [facility] [phone] [id number]    [employer] Mar[employer] Status          Yarsani        Admission Date Admission Type Admitting Provider Attending Provider Department, Room/Bed    2/7/19 Elective Eldon Valadez MD Cervoni, Thomas D., MD [facility] SURG  4, 412/1    Discharge Date Discharge Disposition Discharge Destination                       Attending Provider:  Eldon Valadez MD    Allergies:  No Known Allergies    Isolation:  None   Infection:  None   Code Status:  CPR    Ht:  160 cm (63\")   Wt:  52.3 kg (115 lb 6.4 oz)    Admission Cmt:  None   Principal Problem:  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                 Active Insurance as of 2/7/2019     Primary Coverage     Payor Plan Insurance Group Employer/Plan Group    MEDICARE MEDICARE A & B      Payor Plan Address Payor Plan Phone Number Payor Plan Fax Number Effective Dates    [address] [phone]  5/1/1997 - None Entered    [place] [id number]       Subscriber Name Subscriber Birth Date Member ID       MARIA DOLORES CAMPA 1940 [id number]                 Emergency Contacts      (Rel.) Home Phone Work Phone Mobile Phone    Drew Campa (Spouse) [phone] -- --    Peace Denise (Daughter) [phone] -- --               History & Physical      Eldon Valadez MD at 2/7/2019  8:12 AM        H&P reviewed. The patient was examined and there are no changes to the H&P, inclusive of the physical exam heart, lungs and procedure site specific exam as noted on the current (within 30 days) history and physical full detailed report.    Vitals reviewed on interval just signed.    Repeat " UA negative.    Eldon Valadez MD  2/7/2019  8:13 AM      Electronically signed by Eldon Valadez MD at 2/7/2019  8:13 AM   Source Note             Subjective   Patient ID: Maria Dolores Campa is a 78 y.o. right hand dominant female is here today for follow-up for fracture recovery.  Fracture and Follow-up of the Left Shoulder       CHIEF COMPLAINT:    Fracture follow up evaluation    History of Present Illness    Pain controlled: [x] no   [] yes, continued daily pain.   Medication refill requested: [] no   [x] yes    Patient compliant with instructions: [] no   [x] yes   Other:        Past Medical History:   Diagnosis Date   • COPD (chronic obstructive pulmonary disease) (CMS/[facility])    • MS (multiple sclerosis) (CMS/[facility])         Past Surgical History:   Procedure Laterality Date   • CHOLECYSTECTOMY     • EYE SURGERY      Corneal implant        Family History   Family history unknown: Yes       Social History     Socioeconomic History   • Marital status:      Spouse name: Not on file   • Number of children: Not on file   • Years of education: Not on file   • Highest education level: Not on file   Social Needs   • Financial resource strain: Not on file   • Food insecurity - worry: Not on file   • Food insecurity - inability: Not on file   • Transportation needs - medical: Not on file   • Transportation needs - non-medical: Not on file   Occupational History   • Not on file   Tobacco Use   • Smoking status: Current Every Day Smoker     Packs/day: 0.50     Types: Cigarettes   • Smokeless tobacco: Never Used   Substance and Sexual Activity   • Alcohol use: No   • Drug use: No   • Sexual activity: Defer   Other Topics Concern   • Not on file   Social History Narrative    Right hand dominant       No Known Allergies    Review of Systems   Constitutional: Negative for fever.   Musculoskeletal: Positive for arthralgias and gait problem (limited walker ambulation, and with left shoulder fracture, has been  "holding her walker with right hand only and  assisting on her left side.). Negative for joint swelling.       Objective   Resp 18   Ht 162.6 cm (64\")   Wt 45.4 kg (100 lb)   BMI 17.16 kg/m²       Signs of infection: [x] no                  [] yes   Swelling: [] no                  [x] yes, milder   Skin wound: [] healing well   [] healed well   [x] skin intact   Motor exam grossly intact: [] no                  [x] yes, tho shoulder diffuse weakness.   Neurovascular exam intact: [] no                  [x] yes   Signs of compartment syndrome: [x] no                  [] yes   Signs of DVT: [x] no                  [] yes   Other:      Physical Exam   Constitutional: She appears well-developed. No distress.   HENT:   Head: Normocephalic and atraumatic.   Eyes: EOM are normal. Pupils are equal, round, and reactive to light.   Neck: Neck supple. No tracheal deviation present.   Cardiovascular: Normal rate and regular rhythm.   Pulmonary/Chest: Breath sounds normal. No respiratory distress. She has no wheezes.   Abdominal: Soft. Bowel sounds are normal. She exhibits no distension. There is no tenderness.   Neurological: She is alert.   Skin: Skin is warm and dry.   Psychiatric: She has a normal mood and affect.   Vitals reviewed.    Left Shoulder Exam     Tenderness   Left shoulder tenderness location: diffuse shoulder pain with any movement.    Range of Motion   Active abduction: 20   Forward flexion: 30     Muscle Strength   Abduction: 3/5   Internal rotation: 4/5   External rotation: 2/5   Biceps: 3/5     Tests   Drop arm: positive    Other   Erythema: absent  Pulse: present            Neurologic Exam     Cranial Nerves     CN III, IV, VI   Pupils are equal, round, and reactive to light.  Extraocular motions are normal.       Assessment/Plan   Independent Review of Radiographic Studies:    AP, Grashey AP and lateral left shoulder, indication to evaluate fracture healing, and compared with prior imaging, shows " little interim fracture healing, minimal callus formation and continued comminution and displacement of fragments and posterior angulation and displacement of humerus head of severe impacted proximal humerus fracture.  Laboratory and Other Studies:  No new results reviewed today.   Medical Decision Making:    Stable neurovascular exam.  Limited progress with persistent and/or progressive symptoms.  Continue current management and any additional treatments and workup as outlined in plan.  Closed treatment of fracture versus surgical intervention due to persistent pain and limited fracture healing on serial exams and radiographs.  Reviewed in detail with patient and her  the relative risks (including but not limited to infection, anesthesia reaction, DVT and implant issues or complications, as well as benefits and merits of shoulder fracture surgery and consideration for reverse total shoulder replacement to address severity of pain, complexity of fracture and limited healing thus far.  Her and  would like to consider from treatment options and contact office and or return at her next appointment in 4 weeks.  Patient has chronic pain medication regimen already prescribed, and has shoulder immobilizer.  Considerations also include bone electrical stimulation though insurance usually will not approve until 90 days after fracture.  Surgical treatment of fracture and or dislocation discussed.  Medications as prescribed and only as tolerated.  Physical and occupational therapy planned.    Procedures    Maria Dolores was seen today for fracture and follow-up.    Diagnoses and all orders for this visit:    Other closed displaced fracture of proximal end of left humerus with delayed healing, subsequent encounter         Physical therapy: [] rehab facility    [x] home-based    [] outpatient referral   Ultrasound: [x]not ordered         []order given to patient   Labs: [x]not ordered         []order given to patient    Weight Bearing status: []Full []WBAT []PWB [x]NWB left arm and hand  []Other       *SPECIAL INSTRUCTIONS:  Multi-modal analgesia. Rehabilitation PT/OT planned.      Discussion of orthopaedic goals and activities and patient and/or guardian expressed appreciation.  Risk, benefits, and merits of treatment alternatives reviewed with the patient and/or guardian and questions answered  Regular exercise as tolerated  Guided on proper techniques for mobility, strength, agility and/or conditioning exercises  The nature of the proposed surgery reviewed with the patient including risks, benefits, rehabilitation, recovery timeframe, and outcome expectations  Ice, heat, and/or modalities as beneficial  Cast, splint and/or brace care and usage instructions given  Weight bearing parameters reviewed  Take prescribed medications as instructed only as tolerated     Risks, benefits, and alternative treatments discussed with the patient: [x] Yes [] No    Risk benefits and merits of the proposed surgery were discussed and the patient's questions were answered risks discussed including and not limited to:  Anesthesia reactions  Blood loss and possible transfusion  Infection  Deep venous thrombosis and pulmonary embolus  Nerve, vascular or tendon injury  Fracture  Deformity  Stiffness  Weakness  Prosthesis and implant issues such as loosening, material wear or dislocation  Skin necrosis  Revision surgery or further treatment  Recurrence of problem and condition     Informed consent: [] Signed  [x] To be obtained at hospital  [] Both    Recommendations/Plan:  Exercise, medications, injections, other patient advice, and return appointment as noted.  Brace: Sling and swathe given previously.  Referral: No referrals made at today's visit  Test/Studies: No additional studies ordered. and Consider CT or other imaging depending on clinical progress.  Surgery: Plan non-surgical treatment for current orthopaedic condition. and For intractable  painful limiting condition, patient to consider elective surgical options.  Work/Activity Status: May perform usual activities as tolerated, No strenuous activity. and No use of involved extremity  Patient and  are encouraged to call or return for any issues or concerns.     Return in about 4 weeks (around 2/13/2019) for Recheck.  Patient agreeable to call or return sooner for any concerns.    ADDENDUM:  Patient and  called and patient would like to proceed with left shoulder reconstructive surgery for the condition.    PLANNED SURGICAL PROCEDURE: Left reverse total shoulder arthroplasty.            Electronically signed by Eldon Valadez MD at 1/21/2019  1:35 PM             Eldon Valadez MD at 2/7/2019  8:10 AM          H&P reviewed. The patient was examined and there are no changes to the H&P, inclusive of the physical exam heart, lungs and procedure site specific exam as noted on the current (within 30 days) history and physical full detailed report.    Temp 97.1, pulse 75, resp 20, blood pressure 97/60    Vitals:    02/07/19 0758   BP:    Pulse:    Resp:    Temp:    SpO2: 95%         Electronically signed by Eldon Valadez MD at 2/7/2019  8:12 AM   Source Note             Subjective   Patient ID: Maria Dolores Campa is a 78 y.o. right hand dominant female is here today for follow-up for fracture recovery.  Fracture and Follow-up of the Left Shoulder       CHIEF COMPLAINT:    Fracture follow up evaluation    History of Present Illness    Pain controlled: [x] no   [] yes, continued daily pain.   Medication refill requested: [] no   [x] yes    Patient compliant with instructions: [] no   [x] yes   Other:        Past Medical History:   Diagnosis Date   • COPD (chronic obstructive pulmonary disease) (CMS/HCC)    • MS (multiple sclerosis) (CMS/[facility])         Past Surgical History:   Procedure Laterality Date   • CHOLECYSTECTOMY     • EYE SURGERY      Corneal implant        Family History  "  Family history unknown: Yes       Social History     Socioeconomic History   • Marital status:      Spouse name: Not on file   • Number of children: Not on file   • Years of education: Not on file   • Highest education level: Not on file   Social Needs   • Financial resource strain: Not on file   • Food insecurity - worry: Not on file   • Food insecurity - inability: Not on file   • Transportation needs - medical: Not on file   • Transportation needs - non-medical: Not on file   Occupational History   • Not on file   Tobacco Use   • Smoking status: Current Every Day Smoker     Packs/day: 0.50     Types: Cigarettes   • Smokeless tobacco: Never Used   Substance and Sexual Activity   • Alcohol use: No   • Drug use: No   • Sexual activity: Defer   Other Topics Concern   • Not on file   Social History Narrative    Right hand dominant       No Known Allergies    Review of Systems   Constitutional: Negative for fever.   Musculoskeletal: Positive for arthralgias and gait problem (limited walker ambulation, and with left shoulder fracture, has been holding her walker with right hand only and  assisting on her left side.). Negative for joint swelling.       Objective   Resp 18   Ht 162.6 cm (64\")   Wt 45.4 kg (100 lb)   BMI 17.16 kg/m²       Signs of infection: [x] no                  [] yes   Swelling: [] no                  [x] yes, milder   Skin wound: [] healing well   [] healed well   [x] skin intact   Motor exam grossly intact: [] no                  [x] yes, tho shoulder diffuse weakness.   Neurovascular exam intact: [] no                  [x] yes   Signs of compartment syndrome: [x] no                  [] yes   Signs of DVT: [x] no                  [] yes   Other:      Physical Exam   Constitutional: She appears well-developed. No distress.   HENT:   Head: Normocephalic and atraumatic.   Eyes: EOM are normal. Pupils are equal, round, and reactive to light.   Neck: Neck supple. No tracheal deviation " present.   Cardiovascular: Normal rate and regular rhythm.   Pulmonary/Chest: Breath sounds normal. No respiratory distress. She has no wheezes.   Abdominal: Soft. Bowel sounds are normal. She exhibits no distension. There is no tenderness.   Neurological: She is alert.   Skin: Skin is warm and dry.   Psychiatric: She has a normal mood and affect.   Vitals reviewed.    Left Shoulder Exam     Tenderness   Left shoulder tenderness location: diffuse shoulder pain with any movement.    Range of Motion   Active abduction: 20   Forward flexion: 30     Muscle Strength   Abduction: 3/5   Internal rotation: 4/5   External rotation: 2/5   Biceps: 3/5     Tests   Drop arm: positive    Other   Erythema: absent  Pulse: present            Neurologic Exam     Cranial Nerves     CN III, IV, VI   Pupils are equal, round, and reactive to light.  Extraocular motions are normal.       Assessment/Plan   Independent Review of Radiographic Studies:    AP, Grashey AP and lateral left shoulder, indication to evaluate fracture healing, and compared with prior imaging, shows little interim fracture healing, minimal callus formation and continued comminution and displacement of fragments and posterior angulation and displacement of humerus head of severe impacted proximal humerus fracture.  Laboratory and Other Studies:  No new results reviewed today.   Medical Decision Making:    Stable neurovascular exam.  Limited progress with persistent and/or progressive symptoms.  Continue current management and any additional treatments and workup as outlined in plan.  Closed treatment of fracture versus surgical intervention due to persistent pain and limited fracture healing on serial exams and radiographs.  Reviewed in detail with patient and her  the relative risks (including but not limited to infection, anesthesia reaction, DVT and implant issues or complications, as well as benefits and merits of shoulder fracture surgery and consideration  for reverse total shoulder replacement to address severity of pain, complexity of fracture and limited healing thus far.  Her and  would like to consider from treatment options and contact office and or return at her next appointment in 4 weeks.  Patient has chronic pain medication regimen already prescribed, and has shoulder immobilizer.  Considerations also include bone electrical stimulation though insurance usually will not approve until 90 days after fracture.  Surgical treatment of fracture and or dislocation discussed.  Medications as prescribed and only as tolerated.  Physical and occupational therapy planned.    Procedures    Maria Dolores was seen today for fracture and follow-up.    Diagnoses and all orders for this visit:    Other closed displaced fracture of proximal end of left humerus with delayed healing, subsequent encounter         Physical therapy: [] rehab facility    [x] home-based    [] outpatient referral   Ultrasound: [x]not ordered         []order given to patient   Labs: [x]not ordered         []order given to patient   Weight Bearing status: []Full []WBAT []PWB [x]NWB left arm and hand  []Other       *SPECIAL INSTRUCTIONS:  Multi-modal analgesia. Rehabilitation PT/OT planned.      Discussion of orthopaedic goals and activities and patient and/or guardian expressed appreciation.  Risk, benefits, and merits of treatment alternatives reviewed with the patient and/or guardian and questions answered  Regular exercise as tolerated  Guided on proper techniques for mobility, strength, agility and/or conditioning exercises  The nature of the proposed surgery reviewed with the patient including risks, benefits, rehabilitation, recovery timeframe, and outcome expectations  Ice, heat, and/or modalities as beneficial  Cast, splint and/or brace care and usage instructions given  Weight bearing parameters reviewed  Take prescribed medications as instructed only as tolerated     Risks, benefits, and  alternative treatments discussed with the patient: [x] Yes [] No    Risk benefits and merits of the proposed surgery were discussed and the patient's questions were answered risks discussed including and not limited to:  Anesthesia reactions  Blood loss and possible transfusion  Infection  Deep venous thrombosis and pulmonary embolus  Nerve, vascular or tendon injury  Fracture  Deformity  Stiffness  Weakness  Prosthesis and implant issues such as loosening, material wear or dislocation  Skin necrosis  Revision surgery or further treatment  Recurrence of problem and condition     Informed consent: [] Signed  [x] To be obtained at hospital  [] Both    Recommendations/Plan:  Exercise, medications, injections, other patient advice, and return appointment as noted.  Brace: Sling and swathe given previously.  Referral: No referrals made at today's visit  Test/Studies: No additional studies ordered. and Consider CT or other imaging depending on clinical progress.  Surgery: Plan non-surgical treatment for current orthopaedic condition. and For intractable painful limiting condition, patient to consider elective surgical options.  Work/Activity Status: May perform usual activities as tolerated, No strenuous activity. and No use of involved extremity  Patient and  are encouraged to call or return for any issues or concerns.     Return in about 4 weeks (around 2/13/2019) for Recheck.  Patient agreeable to call or return sooner for any concerns.    ADDENDUM:  Patient and  called and patient would like to proceed with left shoulder reconstructive surgery for the condition.    PLANNED SURGICAL PROCEDURE: Left reverse total shoulder arthroplasty.            Electronically signed by Eldon Valadez MD at 1/21/2019  1:35 PM             Eldon Valadez MD at 1/16/2019  9:15 AM          Subjective   Patient ID: Maria Dolores Campa is a 78 y.o. right hand dominant female is here today for follow-up for fracture  "recovery.  Fracture and Follow-up of the Left Shoulder       CHIEF COMPLAINT:    Fracture follow up evaluation    History of Present Illness    Pain controlled: [x] no   [] yes, continued daily pain.   Medication refill requested: [] no   [x] yes    Patient compliant with instructions: [] no   [x] yes   Other:        Past Medical History:   Diagnosis Date   • COPD (chronic obstructive pulmonary disease) (CMS/[facility])    • MS (multiple sclerosis) (CMS/[facility])         Past Surgical History:   Procedure Laterality Date   • CHOLECYSTECTOMY     • EYE SURGERY      Corneal implant        Family History   Family history unknown: Yes       Social History     Socioeconomic History   • Marital status:      Spouse name: Not on file   • Number of children: Not on file   • Years of education: Not on file   • Highest education level: Not on file   Social Needs   • Financial resource strain: Not on file   • Food insecurity - worry: Not on file   • Food insecurity - inability: Not on file   • Transportation needs - medical: Not on file   • Transportation needs - non-medical: Not on file   Occupational History   • Not on file   Tobacco Use   • Smoking status: Current Every Day Smoker     Packs/day: 0.50     Types: Cigarettes   • Smokeless tobacco: Never Used   Substance and Sexual Activity   • Alcohol use: No   • Drug use: No   • Sexual activity: Defer   Other Topics Concern   • Not on file   Social History Narrative    Right hand dominant       No Known Allergies    Review of Systems   Constitutional: Negative for fever.   Musculoskeletal: Positive for arthralgias and gait problem (limited walker ambulation, and with left shoulder fracture, has been holding her walker with right hand only and  assisting on her left side.). Negative for joint swelling.       Objective   Resp 18   Ht 162.6 cm (64\")   Wt 45.4 kg (100 lb)   BMI 17.16 kg/m²       Signs of infection: [x] no                  [] yes   Swelling: [] no               "    [x] yes, milder   Skin wound: [] healing well   [] healed well   [x] skin intact   Motor exam grossly intact: [] no                  [x] yes, tho shoulder diffuse weakness.   Neurovascular exam intact: [] no                  [x] yes   Signs of compartment syndrome: [x] no                  [] yes   Signs of DVT: [x] no                  [] yes   Other:      Physical Exam   Constitutional: She appears well-developed. No distress.   HENT:   Head: Normocephalic and atraumatic.   Eyes: EOM are normal. Pupils are equal, round, and reactive to light.   Neck: Neck supple. No tracheal deviation present.   Cardiovascular: Normal rate and regular rhythm.   Pulmonary/Chest: Breath sounds normal. No respiratory distress. She has no wheezes.   Abdominal: Soft. Bowel sounds are normal. She exhibits no distension. There is no tenderness.   Neurological: She is alert.   Skin: Skin is warm and dry.   Psychiatric: She has a normal mood and affect.   Vitals reviewed.    Left Shoulder Exam     Tenderness   Left shoulder tenderness location: diffuse shoulder pain with any movement.    Range of Motion   Active abduction: 20   Forward flexion: 30     Muscle Strength   Abduction: 3/5   Internal rotation: 4/5   External rotation: 2/5   Biceps: 3/5     Tests   Drop arm: positive    Other   Erythema: absent  Pulse: present            Neurologic Exam     Cranial Nerves     CN III, IV, VI   Pupils are equal, round, and reactive to light.  Extraocular motions are normal.       Assessment/Plan   Independent Review of Radiographic Studies:    AP, Grashey AP and lateral left shoulder, indication to evaluate fracture healing, and compared with prior imaging, shows little interim fracture healing, minimal callus formation and continued comminution and displacement of fragments and posterior angulation and displacement of humerus head of severe impacted proximal humerus fracture.  Laboratory and Other Studies:  No new results reviewed today.    Medical Decision Making:    Stable neurovascular exam.  Limited progress with persistent and/or progressive symptoms.  Continue current management and any additional treatments and workup as outlined in plan.  Closed treatment of fracture versus surgical intervention due to persistent pain and limited fracture healing on serial exams and radiographs.  Reviewed in detail with patient and her  the relative risks (including but not limited to infection, anesthesia reaction, DVT and implant issues or complications, as well as benefits and merits of shoulder fracture surgery and consideration for reverse total shoulder replacement to address severity of pain, complexity of fracture and limited healing thus far.  Her and  would like to consider from treatment options and contact office and or return at her next appointment in 4 weeks.  Patient has chronic pain medication regimen already prescribed, and has shoulder immobilizer.  Considerations also include bone electrical stimulation though insurance usually will not approve until 90 days after fracture.  Surgical treatment of fracture and or dislocation discussed.  Medications as prescribed and only as tolerated.  Physical and occupational therapy planned.    Procedures    Maria Dolores was seen today for fracture and follow-up.    Diagnoses and all orders for this visit:    Other closed displaced fracture of proximal end of left humerus with delayed healing, subsequent encounter         Physical therapy: [] rehab facility    [x] home-based    [] outpatient referral   Ultrasound: [x]not ordered         []order given to patient   Labs: [x]not ordered         []order given to patient   Weight Bearing status: []Full []WBAT []PWB [x]NWB left arm and hand  []Other       *SPECIAL INSTRUCTIONS:  Multi-modal analgesia. Rehabilitation PT/OT planned.      Discussion of orthopaedic goals and activities and patient and/or guardian expressed appreciation.  Risk, benefits, and  merits of treatment alternatives reviewed with the patient and/or guardian and questions answered  Regular exercise as tolerated  Guided on proper techniques for mobility, strength, agility and/or conditioning exercises  The nature of the proposed surgery reviewed with the patient including risks, benefits, rehabilitation, recovery timeframe, and outcome expectations  Ice, heat, and/or modalities as beneficial  Cast, splint and/or brace care and usage instructions given  Weight bearing parameters reviewed  Take prescribed medications as instructed only as tolerated     Risks, benefits, and alternative treatments discussed with the patient: [x] Yes [] No    Risk benefits and merits of the proposed surgery were discussed and the patient's questions were answered risks discussed including and not limited to:  Anesthesia reactions  Blood loss and possible transfusion  Infection  Deep venous thrombosis and pulmonary embolus  Nerve, vascular or tendon injury  Fracture  Deformity  Stiffness  Weakness  Prosthesis and implant issues such as loosening, material wear or dislocation  Skin necrosis  Revision surgery or further treatment  Recurrence of problem and condition     Informed consent: [] Signed  [x] To be obtained at hospital  [] Both    Recommendations/Plan:  Exercise, medications, injections, other patient advice, and return appointment as noted.  Brace: Sling and swathe given previously.  Referral: No referrals made at today's visit  Test/Studies: No additional studies ordered. and Consider CT or other imaging depending on clinical progress.  Surgery: Plan non-surgical treatment for current orthopaedic condition. and For intractable painful limiting condition, patient to consider elective surgical options.  Work/Activity Status: May perform usual activities as tolerated, No strenuous activity. and No use of involved extremity  Patient and  are encouraged to call or return for any issues or concerns.      Return in about 4 weeks (around 2/13/2019) for Recheck.  Patient agreeable to call or return sooner for any concerns.    ADDENDUM:  Patient and  called and patient would like to proceed with left shoulder reconstructive surgery for the condition.    PLANNED SURGICAL PROCEDURE: Left reverse total shoulder arthroplasty.           Electronically signed by Eldon Valadez MD at 1/21/2019  1:35 PM       Hospital Medications (active)       Dose Frequency Start End    bisacodyl (DULCOLAX) suppository 10 mg 10 mg Daily PRN 2/7/2019     Sig - Route: Insert 1 suppository into the rectum Daily As Needed for Constipation. - Rectal    budesonide (PULMICORT) nebulizer solution 0.5 mg 0.5 mg 2 Times Daily - RT 2/7/2019     Sig - Route: Take 2 mL by nebulization 2 (Two) Times a Day. - Nebulization    Bupivacaine HCl-NaCl 0.125-0.9 % On-Q solution 4-14 mL/hr Continuous 2/7/2019     Sig - Route: 4-14 mL/hr by Peripheral Nerve route Continuous. - Peripheral Nerve    calcium-vitamin D 500-200 MG-UNIT tablet 500 mg 500 mg Daily 2/8/2019     Sig - Route: Take 1 tablet by mouth Daily. - Oral    carBAMazepine (TEGretol) tablet 200 mg 200 mg 2 Times Daily PRN 2/7/2019     Sig - Route: Take 1 tablet by mouth 2 (Two) Times a Day As Needed (trigeminal pain). - Oral    CeFAZolin Sodium-Dextrose (ANCEF) IVPB (duplex) 1 g 1 g Every 8 Hours 2/7/2019 2/8/2019    Sig - Route: Infuse 1,000 mg into a venous catheter Every 8 (Eight) Hours. - Intravenous    docusate sodium (COLACE) capsule 100 mg 100 mg 2 Times Daily PRN 2/7/2019     Sig - Route: Take 1 capsule by mouth 2 (Two) Times a Day As Needed for Constipation. - Oral    fondaparinux (ARIXTRA) injection 2.5 mg 2.5 mg Every 24 Hours Scheduled 2/8/2019     Sig - Route: Inject 0.5 mL under the skin into the appropriate area as directed Daily. - Subcutaneous    HYDROcodone-acetaminophen (NORCO) 5-325 MG per tablet 1 tablet 1 tablet Every 8 Hours PRN 2/7/2019 2/17/2019    Sig - Route: Take  "1 tablet by mouth Every 8 (Eight) Hours As Needed for Moderate Pain . - Oral    ipratropium-albuterol (DUO-NEB) nebulizer solution 3 mL 3 mL Every 6 Hours - RT 2/7/2019     Sig - Route: Take 3 mL by nebulization Every 6 (Six) Hours. - Nebulization    ketorolac (TORADOL) injection 15 mg 15 mg Every 8 Hours 2/7/2019 2/8/2019    Sig - Route: Infuse 0.5 mL into a venous catheter Every 8 (Eight) Hours. - Intravenous    lactated ringers infusion 100 mL/hr Continuous 2/7/2019     Sig - Route: Infuse 100 mL/hr into a venous catheter Continuous. - Intravenous    Morphine sulfate (PF) injection 4 mg 4 mg Every 2 Hours PRN 2/7/2019 2/17/2019    Sig - Route: Infuse 1 mL into a venous catheter Every 2 (Two) Hours As Needed for Moderate Pain . - Intravenous    Linked Group 1:  \"And\" Linked Group Details        multivitamin with minerals 1 tablet 1 tablet Daily 2/8/2019     Sig - Route: Take 1 tablet by mouth Daily. - Oral    naloxone (NARCAN) injection 0.4 mg 0.4 mg Every 5 Minutes PRN 2/7/2019     Sig - Route: Infuse 1 mL into a venous catheter Every 5 (Five) Minutes As Needed for Respiratory Depression. - Intravenous    Linked Group 1:  \"And\" Linked Group Details        nicotine (NICODERM CQ) 21 MG/24HR patch 1 patch 1 patch Every 24 Hours 2/7/2019     Sig - Route: Place 1 patch on the skin as directed by provider Daily. - Transdermal    ondansetron (ZOFRAN) injection 4 mg 4 mg Every 6 Hours PRN 2/7/2019     Sig - Route: Infuse 2 mL into a venous catheter Every 6 (Six) Hours As Needed for Nausea or Vomiting. - Intravenous    Linked Group 2:  \"Or\" Linked Group Details        ondansetron (ZOFRAN) tablet 4 mg 4 mg Every 6 Hours PRN 2/7/2019     Sig - Route: Take 1 tablet by mouth Every 6 (Six) Hours As Needed for Nausea or Vomiting. - Oral    Linked Group 2:  \"Or\" Linked Group Details        ondansetron ODT (ZOFRAN-ODT) disintegrating tablet 4 mg 4 mg Every 6 Hours PRN 2/7/2019     Sig - Route: Take 1 tablet by mouth Every 6 (Six) " "Hours As Needed for Nausea or Vomiting. - Oral    Linked Group 2:  \"Or\" Linked Group Details        PARoxetine (PAXIL) tablet 20 mg 20 mg Every Morning 2/7/2019     Sig - Route: Take 1 tablet by mouth Every Morning. - Oral    propranolol (INDERAL) tablet 20 mg 20 mg 3 Times Daily 2/7/2019     Sig - Route: Take 2 tablets by mouth 3 (Three) Times a Day. - Oral    sennosides-docusate sodium (SENOKOT-S) 8.6-50 MG tablet 2 tablet 2 tablet Nightly 2/7/2019     Sig - Route: Take 2 tablets by mouth Every Night. - Oral    sodium chloride 0.9 % flush 1-10 mL 1-10 mL As Needed 2/7/2019     Sig - Route: Infuse 1-10 mL into a venous catheter As Needed for Line Care. - Intravenous    sodium chloride 0.9 % flush 3 mL 3 mL Every 12 Hours Scheduled 2/7/2019     Sig - Route: Infuse 3 mL into a venous catheter Every 12 (Twelve) Hours. - Intravenous    calcium-vitamin D 500-200 MG-UNIT per tablet 1 tablet (Discontinued) 1 tablet Daily 2/8/2019 2/7/2019    Sig - Route: Take 1 tablet by mouth Daily. - Oral    Reason for Discontinue: *Re-Entry    carBAMazepine (TEGretol) tablet 200 mg (Discontinued) 200 mg 2 Times Daily 2/7/2019 2/7/2019    Sig - Route: Take 1 tablet by mouth 2 (Two) Times a Day. - Oral    dexamethasone (DECADRON) injection (Discontinued)  As Needed 2/7/2019 2/7/2019    Sig - Route: Infuse  into a venous catheter As Needed. - Intravenous    Reason for Discontinue: Anesthesia Stop    ePHEDrine injection (Discontinued)  As Needed 2/7/2019 2/7/2019    Sig: As Needed.    Reason for Discontinue: Anesthesia Stop    FentaNYL Citrate (PF) (SUBLIMAZE) injection (Discontinued)  As Needed 2/7/2019 2/7/2019    Sig - Route: Infuse  into a venous catheter As Needed. - Intravenous    Reason for Discontinue: Anesthesia Stop    HYDROmorphone (DILAUDID) injection 0.2 mg (Discontinued) 0.2 mg Every 15 Minutes PRN 2/7/2019 2/7/2019    Sig - Route: Infuse 0.2 mL into a venous catheter Every 15 (Fifteen) Minutes As Needed for Severe Pain . - " Intravenous    Reason for Discontinue: Duplicate order    HYDROmorphone (DILAUDID) injection 0.25 mg (Discontinued) 0.25 mg Every 15 Minutes PRN 2/7/2019 2/7/2019    Sig - Route: Infuse 0.25 mL into a venous catheter Every 15 (Fifteen) Minutes As Needed for Severe Pain . - Intravenous    Reason for Discontinue: Patient Transfer    lactated ringers infusion 1,000 mL (Discontinued) 1,000 mL Continuous 2/7/2019 2/7/2019    Sig - Route: Infuse 1,000 mL into a venous catheter Continuous. - Intravenous    lidocaine (cardiac) (XYLOCAINE) injection (Discontinued)  As Needed 2/7/2019 2/7/2019    Sig - Route: Infuse  into a venous catheter As Needed. - Intravenous    Reason for Discontinue: Anesthesia Stop    lidocaine PF 2% (XYLOCAINE) injection (Discontinued)   2/7/2019 2/7/2019    Sig - Route: Inject  as directed. - Injection    Reason for Discontinue: Anesthesia Stop    meperidine (DEMEROL) injection 12.5 mg (Discontinued) 12.5 mg Every 5 Minutes PRN 2/7/2019 2/7/2019    Sig - Route: Infuse 0.25 mL into a venous catheter Every 5 (Five) Minutes As Needed for Shivering (May repeat x 1). - Intravenous    Reason for Discontinue: Duplicate order    meperidine (DEMEROL) injection 12.5 mg (Discontinued) 12.5 mg Every 5 Minutes PRN 2/7/2019 2/7/2019    Sig - Route: Infuse 0.25 mL into a venous catheter Every 5 (Five) Minutes As Needed for Shivering (May repeat x 1). - Intravenous    Reason for Discontinue: Patient Transfer    midazolam (VERSED) injection (Discontinued)  As Needed 2/7/2019 2/7/2019    Sig - Route: Infuse  into a venous catheter As Needed. - Intravenous    Reason for Discontinue: Anesthesia Stop    ondansetron (ZOFRAN) injection 4 mg (Discontinued) 4 mg Once As Needed 2/7/2019 2/7/2019    Sig - Route: Infuse 2 mL into a venous catheter 1 (One) Time As Needed for Nausea or Vomiting. - Intravenous    Reason for Discontinue: Duplicate order    ondansetron (ZOFRAN) injection 4 mg (Discontinued) 4 mg Once As Needed  "2/7/2019 2/7/2019    Sig - Route: Infuse 2 mL into a venous catheter 1 (One) Time As Needed for Nausea or Vomiting. - Intravenous    Reason for Discontinue: Patient Transfer    ondansetron (ZOFRAN) injection (Discontinued)  As Needed 2/7/2019 2/7/2019    Sig - Route: Infuse  into a venous catheter As Needed. - Intravenous    Reason for Discontinue: Anesthesia Stop    promethazine (PHENERGAN) injection 6.25 mg (Discontinued) 6.25 mg Once As Needed 2/7/2019 2/7/2019    Sig - Route: Infuse 0.25 mL into a venous catheter 1 (One) Time As Needed for Nausea or Vomiting. - Intravenous    Reason for Discontinue: Duplicate order    Linked Group 3:  \"Or\" Linked Group Details        promethazine (PHENERGAN) injection 6.25 mg (Discontinued) 6.25 mg Once As Needed 2/7/2019 2/7/2019    Sig - Route: Inject 0.25 mL into the appropriate muscle as directed by prescriber 1 (One) Time As Needed for Nausea or Vomiting. - Intramuscular    Reason for Discontinue: Duplicate order    Linked Group 3:  \"Or\" Linked Group Details        promethazine (PHENERGAN) injection 6.25 mg (Discontinued) 6.25 mg Once As Needed 2/7/2019 2/7/2019    Sig - Route: Infuse 0.25 mL into a venous catheter 1 (One) Time As Needed for Nausea or Vomiting. - Intravenous    Reason for Discontinue: Patient Transfer    Linked Group 4:  \"Or\" Linked Group Details        promethazine (PHENERGAN) injection 6.25 mg (Discontinued) 6.25 mg Once As Needed 2/7/2019 2/7/2019    Sig - Route: Inject 0.25 mL into the appropriate muscle as directed by prescriber 1 (One) Time As Needed for Nausea or Vomiting. - Intramuscular    Reason for Discontinue: Patient Transfer    Linked Group 4:  \"Or\" Linked Group Details        promethazine (PHENERGAN) suppository 25 mg (Discontinued) 25 mg Once As Needed 2/7/2019 2/7/2019    Sig - Route: Insert 1 suppository into the rectum 1 (One) Time As Needed for Nausea or Vomiting. - Rectal    Reason for Discontinue: Duplicate order    Linked Group 3:  " "\"Or\" Linked Group Details        promethazine (PHENERGAN) suppository 25 mg (Discontinued) 25 mg Once As Needed 2/7/2019 2/7/2019    Sig - Route: Insert 1 suppository into the rectum 1 (One) Time As Needed for Nausea or Vomiting. - Rectal    Reason for Discontinue: Patient Transfer    Linked Group 4:  \"Or\" Linked Group Details        promethazine (PHENERGAN) tablet 25 mg (Discontinued) 25 mg Once As Needed 2/7/2019 2/7/2019    Sig - Route: Take 1 tablet by mouth 1 (One) Time As Needed for Nausea or Vomiting. - Oral    Reason for Discontinue: Patient Transfer    Linked Group 3:  \"Or\" Linked Group Details        promethazine (PHENERGAN) tablet 25 mg (Discontinued) 25 mg Once As Needed 2/7/2019 2/7/2019    Sig - Route: Take 1 tablet by mouth 1 (One) Time As Needed for Nausea or Vomiting. - Oral    Reason for Discontinue: Patient Transfer    Linked Group 4:  \"Or\" Linked Group Details        Propofol (DIPRIVAN) injection (Discontinued)  As Needed 2/7/2019 2/7/2019    Sig - Route: Infuse  into a venous catheter As Needed. - Intravenous    Reason for Discontinue: Anesthesia Stop    propranolol (INDERAL) tablet 20 mg (Discontinued) 20 mg 3 Times Daily 2/7/2019 2/7/2019    Sig - Route: Take 1 tablet by mouth 3 (Three) Times a Day. - Oral    Reason for Discontinue: *Re-Entry    propranolol (INDERAL) tablet 20 mg (Discontinued) 20 mg 3 Times Daily 2/7/2019 2/7/2019    Sig - Route: Take 2 tablets by mouth 3 (Three) Times a Day. - Oral    Reason for Discontinue: Alternate therapy    rocuronium (ZEMURON) injection (Discontinued)  As Needed 2/7/2019 2/7/2019    Sig - Route: Infuse  into a venous catheter As Needed. - Intravenous    Reason for Discontinue: Anesthesia Stop    ropivacaine (NAROPIN) 0.5 % injection (Discontinued)   2/7/2019 2/7/2019    Sig - Route: by Peripheral Nerve route. - Peripheral Nerve    Reason for Discontinue: Anesthesia Stop    sodium chloride 0.9 % flush 3 mL (Discontinued) 3 mL As Needed 2/7/2019 2/7/2019 "    Sig - Route: Infuse 3 mL into a venous catheter As Needed for Line Care. - Intravenous    Reason for Discontinue: Patient Transfer    sodium chloride 1,000 mL with ceFAZolin 1 g irrigation (Discontinued)  As Needed 2019    Sig: As Needed.    Reason for Discontinue: Patient Discharge    sterile water irrigation solution (Discontinued)  As Needed 2019    Sig: As Needed.    Reason for Discontinue: Patient Discharge          Operative/Procedure Notes (last 24 hours) (Notes from 2019 10:31 AM through 2019 10:31 AM)     No notes of this type exist for this encounter.        Physician Progress Notes (last 24 hours) (Notes from 2019 10:31 AM through 2019 10:31 AM)     No notes of this type exist for this encounter.           Consult Notes (last 24 hours) (Notes from 2019 10:31 AM through 2019 10:31 AM)      Kay Sanchez APRN at 2019  2:42 PM      Consult Orders    1. Inpatient Consult to Hospitalist [[id number]] ordered by Eldon Valadez MD at [address] [place], [place] [address]IST   CONSULTATION      Name:  Maria Dolores Campa   Age:  78 y.o.  Sex:  female  :  1940  MRN:  [id number]   Visit Number:  [id number]  Admission Date:  2019  Date Of Service:  19  Primary Care Physician:  Gregorio Jackson MD    Consulting Physician:    LENO Santos    Referring Physician:    Dr. Valadez     Reason For Consult:  Left Shoulder      Chief Complaint:   Generalized weakness    History Of Presenting Illness:    This is a 78-year-old female who presented to Dr. Valadez's office with complaints of left shoulder pain.  Patient had a left shoulder comminuted displaced unstable four-part fracture with a delayed union.  She was brought in on the day of admission for a left reversal of a total shoulder arthroplasty with rotator cuff and tuberosity reconstruction for complex fracture.  Patient does have a  history of COPD and multiple sclerosis.  She is not on any disease modifying therapy for the multiple sclerosis due to a reaction.  According to the daughter she has not walked in over a month.  Even prior to this she had difficulty with ambulation secondary to the multiple sclerosis.  The family are asking about the possibility of rehabilitation placement since she is continuing to decline.  She also has a history of COPD for which she does not use any medications at home nor does she use oxygen at home.  She is currently on 4 L of oxygen nasal cannula and according to the daughter she's been told in the past that she needs oxygen but she has not complied with this.  At the time of my evaluation patient has some rhonchorous breath sounds however according to the daughter this is her baseline.  I will start her on doing as well as Pulmicort.  I will also order an oscillating positive pressure valve to see if this will help with secretion clearance.      Review Of Systems:     General ROS: Patient denies any fevers, chills or loss of consciousness. Complains of generalized weakness.    Psychological ROS: Denies any hallucinations and delusions.  Ophthalmic ROS: Denies any diplopia or transient loss of vision.  ENT ROS: Denies sore throat, nasal congestion or ear pain.   Allergy and Immunology ROS: Denies rash or itching.  Hematological and Lymphatic ROS: Denies neck swelling or easy bleeding.  Endocrine ROS: Denies any recent unintentional weight gain or loss.  Breast ROS: Denies any pain or swelling.  Respiratory ROS: chronic cough with clear phlegm No shortness of breath.   Cardiovascular ROS: Denies chest pain or palpitations. No history of exertional chest pain.   Gastrointestinal ROS: Denies nausea and vomiting. Denies any abdominal pain. No diarrhea.   Genito-Urinary ROS: Denies dysuria or hematuria.  Musculoskeletal ROS: Complains of chronic back pain. No muscle pain. No calf pain. Neurological ROS: Denies any  "focal weakness. No loss of consciousness. Denies any numbness. Denies neck pain. Right shoulder pain  Dermatological ROS: Denies any redness or pruritis.     Past Medical History:    Past Medical History:   Diagnosis Date   • Body piercing     EARS   • Constipation    • COPD (chronic obstructive pulmonary disease) (CMS/[facility])    • Cough     NOTED WHILE PATIENT WAS IN PREADMISSION TESTING. PATIENT REPORTS \"CLEAR PHLEM\" WITH NO FEVER AND STATED \"IT'S JUST A SMOKERS COUGH\"   • Full dentures     INSTRUCTED NO ADHESIVES THE DOS   • GERD (gastroesophageal reflux disease)     REPORTS ONLY INTERMITTENT EPISODES   • Hearing loss     PATIENT REPORTS AGE RELATED. NO USE OF HEARING AIDS.    • History of fracture     REPORTS HISTORY OF FRACTURED PELVIS.  REPORTS FALL EARLY JAN 2019 RESULTING IN FRACTURED LEFT SHOULDER.   • History of pneumonia     REPORTS MULTIPLE TIMES   • History of transfusion     REPORTS AS A TEEN AND THAT SHE DID NOT HAVE ANY REACTIONS TO TRANSFUSION   • Hypotension     REPORTS \"MY BLOOD PRESSURE IS ALWAYS LOW - I HAVE NEVER BEEN ABLE TO DONATE BLOOD\"   • Impaired mobility     RELATED TO MULTIPLE SCLEROSIS   • MS (multiple sclerosis) (CMS/[facility])    • Tattoo    • Wears glasses        Past Surgical history:    Past Surgical History:   Procedure Laterality Date   • APPENDECTOMY      REPORTS SHE THINKS SHE HAD APPENDIX REMOVED WHEN GALLBLADDER WAS REMOVED.   • CHOLECYSTECTOMY     • EYE SURGERY      Corneal implant, PATIENT REPORTS SHE IS UNSURE LATERALITY   • MOUTH SURGERY      FULL MOUTH EXTRACTION       Social History:    Social History     Socioeconomic History   • Marital status:      Spouse name: Not on file   • Number of children: Not on file   • Years of education: Not on file   • Highest education level: Not on file   Social Needs   • Financial resource strain: Not on file   • Food insecurity - worry: Not on file   • Food insecurity - inability: Not on file   • Transportation needs - medical: Not on " file   • Transportation needs - non-medical: Not on file   Occupational History   • Not on file   Tobacco Use   • Smoking status: Current Every Day Smoker     Packs/day: 0.50     Years: 58.00     Pack years: 29.00     Types: Cigarettes   • Smokeless tobacco: Never Used   • Tobacco comment: REPORTS SHE HAS SMOKED UP TO 1 PPD IN THE PAST   Substance and Sexual Activity   • Alcohol use: No   • Drug use: No   • Sexual activity: Defer   Other Topics Concern   • Not on file   Social History Narrative    Right hand dominant       Family History:    Family History   Family history unknown: Yes       Allergies:      Patient has no known allergies.    Home Medications:    Prior to Admission Medications     Prescriptions Last Dose Informant Patient Reported? Taking?    Acetaminophen-Caffeine (EXCEDRIN TENSION HEADACHE) 500-65 MG tablet Past Week Self Yes Yes    Take 1 tablet by mouth As Needed (HEADACHE).    Calcium 600-400 MG-UNIT chewable tablet 2/6/2019 Self Yes Yes    Chew 1 tablet 2 (Two) Times a Day.    carBAMazepine (TEGretol) 200 MG tablet 2/6/2019 Self Yes Yes    Take 200 mg by mouth 2 (Two) Times a Day.    HYDROcodone-acetaminophen (NORCO) 5-325 MG per tablet 2/6/2019 Self Yes Yes    Take 1 tablet by mouth Every 8 (Eight) Hours As Needed for Mild Pain  or Moderate Pain .    Multiple Vitamins-Minerals (MULTIVITAMIN ADULTS PO) 2/6/2019 Self Yes Yes    Take 1 tablet by mouth Daily.    nicotine (NICODERM CQ) 21 MG/24HR patch Past Week Self No Yes    Place 1 patch on the skin as directed by provider Daily.    PARoxetine (PAXIL) 20 MG tablet 2/6/2019 Self Yes Yes    Take 20 mg by mouth Every Morning.    propranolol (INDERAL) 40 MG tablet 2/6/2019 Self Yes Yes    Take 20 mg by mouth 3 (Three) Times a Day.    sennosides-docusate sodium (SENOKOT-S) 8.6-50 MG tablet 2/6/2019 Self Yes Yes    Take 2 tablets by mouth Every Night.    levoFLOXacin (LEVAQUIN) 500 MG tablet   No No    Take 1 tablet by mouth Daily for 7 days.              Hospital Scheduled Meds:      [START ON 2/8/2019] calcium-vitamin D 500 mg Oral Daily   ceFAZolin 1 g Intravenous Q8H   [START ON 2/8/2019] fondaparinux 2.5 mg Subcutaneous Q24H   ketorolac 15 mg Intravenous Q8H   [START ON 2/8/2019] multivitamin with minerals 1 tablet Oral Daily   nicotine 1 patch Transdermal Q24H   PARoxetine 20 mg Oral QAM   propranolol 20 mg Oral TID   sennosides-docusate sodium 2 tablet Oral Nightly   sodium chloride 3 mL Intravenous Q12H         Bupivacaine HCl-NaCl 4-14 mL/hr Last Rate: 4 mL/hr (02/07/19 1252)   lactated ringers 100 mL/hr         Vital Signs:    Temp:  [97.1 °F (36.2 °C)-97.6 °F (36.4 °C)] 97.6 °F (36.4 °C)  Heart Rate:  [67-97] 97  Resp:  [14-18] 14  BP: (91-97)/(60-68) 91/68        02/07/19  1341   Weight: 52.4 kg (115 lb 7 oz)       Body mass index is 20.45 kg/m².    Physical Exam:    General Appearance:    Alert and cooperative, not in any acute distress.   Head:    Atraumatic and normocephalic, without obvious abnormality.   Eyes:            PERRLA, conjunctivae and sclerae normal, no Icterus. No pallor. Extraocular movements are within normal limits.   Ears:    Ears appear intact with no abnormalities noted.   Throat:   No oral lesions, no thrush, oral mucosa moist.   Neck:   Supple, trachea midline, no thyromegaly, no carotid bruit.         Lungs:     Breath sounds heard bilaterally equally.  No crackles or wheezing. Bilateral rhonchi noted.     Heart:    Normal S1 and S2, no murmur, no gallop, no rub. No JVD   Abdomen:     Normal bowel sounds, no masses, no organomegaly. Soft        non-tender, non-distended, no guarding, no rebound                tenderness   Extremities:  Can move extremities but her strength is decreased bilaterally.  Cannot move left shoulder, no edema, no cyanosis, no Clubbing,  Muscle wasting noted in upper and lower extremities.  Dressing to left shoulder.      Pulses:   Pulses palpable and equal bilaterally   Skin:   No bleeding, bruising  or rash, no skin breakdown, pink, warm and dry       Neurologic:   Alert and oriented.           Labs:    Lab Results (last 24 hours)     Procedure Component Value Units Date/Time    Tissue Pathology Exam [[id number]] Collected:  02/07/19 1017    Specimen:  Bone from Shoulder, Left Updated:  02/07/19 1310    Urinalysis With Culture If Indicated - Urine, Catheter In/Out [[id number]]  (Normal) Collected:  02/07/19 0710    Specimen:  Urine, Catheter In/Out Updated:  02/07/19 0717     Color, UA Yellow     Appearance, UA Clear     pH, UA 7.0     Specific Gravity, UA 1.015     Glucose, UA Negative     Ketones, UA Negative     Bilirubin, UA Negative     Blood, UA Negative     Protein, UA Negative     Leuk Esterase, UA Negative     Nitrite, UA Negative     Urobilinogen, UA 0.2 E.U./dL    Narrative:       Urine microscopic not indicated.          Radiology:    Imaging Results (last 72 hours)     Procedure Component Value Units Date/Time    XR Shoulder 2+ View Left [[id number]] Collected:  02/07/19 1252     Updated:  02/07/19 1305    Narrative:       LEFT SHOULDER     INDICATION: Postoperative assessment.     FINDINGS: 2 views of the left shoulder, compared with 01/16/2019  demonstrate postoperative changes of a reverse shoulder arthroplasty.  Soft tissue gas and skin staples are consistent with the recent surgery.  There does appear to be a residual fracture fragment adjacent to the  proximal humerus. Some questionable irregularity of the periphery of the  scapula/glenoid just superior to the scapular component of the  prosthesis which may also represent fracture fragments. Opacification at  the base of the left chest is likely similar to a portable chest  radiograph dated 01/30/2019 and may represent a combination of effusion  and atelectasis or infiltrate.       Impression:       Postoperative changes of the left shoulder with some  possible residual fracture fragments as detailed above.     This report was finalized on  2/7/2019 12:53 PM by Jared Brothers MD.          Assessment/Recommendations/Plan:   1.  COPD-  I will place patient on duo nebs and Pulmicort as well as order an oscillating positive pressure device to help with secretion clearance.  Patient does have a chronic cough.    2.  Multiple sclerosis with progressive decline-PT OT has been consulted.  Family does want to discuss rehab placement.      3.  Status post left reverse total shoulder arthroplasty with rotator cuff and tuberosity reconstruction for a complex fracture-orthopedics is managing    4.  Tobacco abuse- Counseling          LENO Santos  02/07/19  2:42 PM    Electronically signed by Kay Sanchez APRN at 2/7/2019  3:42 PM          Physical Therapy Notes (last 24 hours) (Notes from 2/7/2019 10:31 AM through 2/8/2019 10:31 AM)      Nila Torrez, PT at 2/7/2019  3:43 PM  Version 1 of 1            02/07/19 1533   Rehab Time/Intention   Evaluation Not Performed patient/family declined evaluation  (Pt adamantly states she has had too many people in and out of her room today asking questions and she is not participating with PT eval today. PT will follow up with patient tomorrow and proceed with eval as tolerated and appropriate.)   Rehab Treatment   Discipline physical therapist       Electronically signed by Nila Torrez, PT at 2/7/2019  3:43 PM          Occupational Therapy Notes (last 24 hours) (Notes from 2/7/2019 10:31 AM through 2/8/2019 10:31 AM)      Nitza Alexander OT at 2/7/2019  4:18 PM             02/07/19 1535   Rehab Time/Intention   Evaluation Not Performed patient/family declined evaluation  (Patient states she would prefer to wait until tomorrow. )   Rehab Treatment   Discipline occupational therapist       Electronically signed by Nitza Alexander OT at 2/7/2019  4:18 PM

## 2019-02-08 NOTE — PROGRESS NOTES
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        Subjective:    She has problems getting up to the commode, otherwise seems to be doing ok, operative site pain appears to be controlled, no increasing dyspnea or cough or chest pain complaints.      Vital Signs  Temp:  [97.3 °F (36.3 °C)-98.9 °F (37.2 °C)] 98.9 °F (37.2 °C)  Heart Rate:  [] 83  Resp:  [15-18] 17  BP: ()/(56-67) 96/61    Physical Exam:   General Appearance:    Alert, cooperative, in no acute distress   Head:    Normocephalic, without obvious abnormality, atraumatic       Throat:      Neck:     Back:        Lungs:     Clear no rales rhonchi or wheeze    Heart:   regular rhythm no murmur   Chest Wall:       Abdomen:        Extremities:      Pulses:      Skin:      Lymph nodes:      Neurologic:            Results Review:     I reviewed the patient's new clinical results.    Lab Results (last 24 hours)     Procedure Component Value Units Date/Time    Basic Metabolic Panel [[id number]]  (Abnormal) Collected:  02/08/19 0511    Specimen:  Blood Updated:  02/08/19 0625     Glucose 92 mg/dL      BUN 26 mg/dL      Creatinine 0.60 mg/dL      Sodium 138 mmol/L      Potassium 4.3 mmol/L      Chloride 106 mmol/L      CO2 26.0 mmol/L      Calcium 8.3 mg/dL      eGFR Non African Amer 97 mL/min/1.73      BUN/Creatinine Ratio 43.3     Anion Gap 10.3 mmol/L     Narrative:       The MDRD GFR formula is only valid for adults with stable renal function between ages 18 and 70.    CBC & Differential [[id number]] Collected:  02/08/19 0511    Specimen:  Blood Updated:  02/08/19 0613    Narrative:       The following orders were created for panel order CBC & Differential.  Procedure                               Abnormality         Status                     ---------                               -----------         ------                     CBC Auto Differential[[id number]]         Abnormal            Final result                 Please view results for these tests on the individual orders.    CBC Auto Differential [[id number]]  (Abnormal) Collected:  02/08/19 0511    Specimen:  Blood Updated:  02/08/19 0613     WBC 6.88 10*3/mm3      RBC 3.48 10*6/mm3      Hemoglobin 11.1 g/dL      Hematocrit 34.9 %      .3 fL      MCH 31.9 pg      MCHC 31.8 g/dL      RDW 12.6 %      RDW-SD 46.3 fl      MPV 10.8 fL      Platelets 208 10*3/mm3      Neutrophil % 66.4 %      Lymphocyte % 21.4 %      Monocyte % 11.2 %      Eosinophil % 0.4 %      Basophil % 0.3 %      Immature Grans % 0.3 %      Neutrophils, Absolute 4.57 10*3/mm3      Lymphocytes, Absolute 1.47 10*3/mm3      Monocytes, Absolute 0.77 10*3/mm3      Eosinophils, Absolute 0.03 10*3/mm3      Basophils, Absolute 0.02 10*3/mm3      Immature Grans, Absolute 0.02 10*3/mm3      nRBC 0.0 /100 WBC         Imaging Results (last 24 hours)     ** No results found for the last 24 hours. **            Gregorio Jackson MD  02/08/19  4:59 PM

## 2019-02-08 NOTE — PROGRESS NOTES
[facility]    Nerve Cath Post Op Call    Patient Name: Maria Dolores Campa  :  1940  MRN:  [id number]  Date of Discharge:     Nerve Cath Post Op Call:    Catheter Plan: Patient called/No answer/Message left to call [facility] pain service for any questions or complaints

## 2019-02-08 NOTE — PLAN OF CARE
Problem: Fall Risk (Adult)  Goal: Absence of Fall  Outcome: Ongoing (interventions implemented as appropriate)   02/07/19 2258   Fall Risk (Adult)   Absence of Fall making progress toward outcome       Problem: Shoulder Arthroplasty (Adult)  Goal: Signs and Symptoms of Listed Potential Problems Will be Absent, Minimized or Managed (Shoulder Arthroplasty)  Outcome: Ongoing (interventions implemented as appropriate)   02/07/19 2258   Goal/Outcome Evaluation   Problems Assessed (Shoulder Arthro) pain   Problems Present (Shoulder Arthro) pain;functional deficit     Goal: Anesthesia/Sedation Recovery  Outcome: Ongoing (interventions implemented as appropriate)   02/07/19 2258   Goal/Outcome Evaluation   Anesthesia/Sedation Recovery recovered to baseline       Problem: Skin Injury Risk (Adult)  Goal: Identify Related Risk Factors and Signs and Symptoms  Outcome: Ongoing (interventions implemented as appropriate)   02/07/19 2258   Skin Injury Risk (Adult)   Related Risk Factors (Skin Injury Risk) mobility impaired;cognitive impairment     Goal: Skin Health and Integrity  Outcome: Ongoing (interventions implemented as appropriate)   02/07/19 2258   Skin Injury Risk (Adult)   Skin Health and Integrity making progress toward outcome

## 2019-02-09 PROCEDURE — 99232 SBSQ HOSP IP/OBS MODERATE 35: CPT | Performed by: INTERNAL MEDICINE

## 2019-02-09 PROCEDURE — 25010000002 FONDAPARINUX PER 0.5 MG: Performed by: ORTHOPAEDIC SURGERY

## 2019-02-09 PROCEDURE — 25010000002 MORPHINE PER 10 MG: Performed by: ORTHOPAEDIC SURGERY

## 2019-02-09 PROCEDURE — 99024 POSTOP FOLLOW-UP VISIT: CPT | Performed by: ORTHOPAEDIC SURGERY

## 2019-02-09 RX ORDER — PROPRANOLOL HYDROCHLORIDE 10 MG/1
20 TABLET ORAL
Status: DISCONTINUED | OUTPATIENT
Start: 2019-02-09 | End: 2019-02-11 | Stop reason: HOSPADM

## 2019-02-09 RX ADMIN — CALCIUM CARBONATE 500 MG (1,250 MG)-VITAMIN D3 200 UNIT TABLET 500 MG: at 09:30

## 2019-02-09 RX ADMIN — CARBAMAZEPINE 200 MG: 200 TABLET ORAL at 09:31

## 2019-02-09 RX ADMIN — HYDROCODONE BITARTRATE AND ACETAMINOPHEN 1 TABLET: 5; 325 TABLET ORAL at 06:10

## 2019-02-09 RX ADMIN — SODIUM CHLORIDE, PRESERVATIVE FREE 3 ML: 5 INJECTION INTRAVENOUS at 21:47

## 2019-02-09 RX ADMIN — PAROXETINE HYDROCHLORIDE 20 MG: 20 TABLET, FILM COATED ORAL at 06:10

## 2019-02-09 RX ADMIN — MORPHINE SULFATE 4 MG: 4 INJECTION INTRAVENOUS at 03:35

## 2019-02-09 RX ADMIN — NICOTINE 1 PATCH: 21 PATCH TRANSDERMAL at 14:23

## 2019-02-09 RX ADMIN — HYDROCODONE BITARTRATE AND ACETAMINOPHEN 1 TABLET: 5; 325 TABLET ORAL at 17:21

## 2019-02-09 RX ADMIN — PROPRANOLOL HYDROCHLORIDE 20 MG: 10 TABLET ORAL at 17:21

## 2019-02-09 RX ADMIN — PROPRANOLOL HYDROCHLORIDE 20 MG: 10 TABLET ORAL at 09:30

## 2019-02-09 RX ADMIN — SENNOSIDES AND DOCUSATE SODIUM 2 TABLET: 8.6; 5 TABLET ORAL at 21:47

## 2019-02-09 RX ADMIN — SODIUM CHLORIDE, POTASSIUM CHLORIDE, SODIUM LACTATE AND CALCIUM CHLORIDE 100 ML/HR: 600; 310; 30; 20 INJECTION, SOLUTION INTRAVENOUS at 14:22

## 2019-02-09 RX ADMIN — FONDAPARINUX SODIUM 2.5 MG: 2.5 INJECTION, SOLUTION SUBCUTANEOUS at 09:31

## 2019-02-09 RX ADMIN — MULTIPLE VITAMINS W/ MINERALS TAB 1 TABLET: TAB at 09:30

## 2019-02-09 NOTE — PLAN OF CARE
Problem: Fall Risk (Adult)  Goal: Absence of Fall  Outcome: Ongoing (interventions implemented as appropriate)   02/09/19 0127   Fall Risk (Adult)   Absence of Fall making progress toward outcome       Problem: Shoulder Arthroplasty (Adult)  Goal: Signs and Symptoms of Listed Potential Problems Will be Absent, Minimized or Managed (Shoulder Arthroplasty)  Outcome: Ongoing (interventions implemented as appropriate)   02/09/19 0127   Goal/Outcome Evaluation   Problems Assessed (Shoulder Arthro) pain   Problems Present (Shoulder Arthro) pain     Goal: Anesthesia/Sedation Recovery  Outcome: Ongoing (interventions implemented as appropriate)   02/09/19 0127   Goal/Outcome Evaluation   Anesthesia/Sedation Recovery recovered to baseline       Problem: Skin Injury Risk (Adult)  Goal: Identify Related Risk Factors and Signs and Symptoms  Outcome: Ongoing (interventions implemented as appropriate)   02/09/19 0127   Skin Injury Risk (Adult)   Related Risk Factors (Skin Injury Risk) advanced age;mobility impaired;cognitive impairment     Goal: Skin Health and Integrity  Outcome: Ongoing (interventions implemented as appropriate)   02/09/19 0127   Skin Injury Risk (Adult)   Skin Health and Integrity making progress toward outcome

## 2019-02-09 NOTE — PROGRESS NOTES
[facility]  PROGRESS NOTE    Name:  Maria Dolores Cmapa   Age:  78 y.o.  Sex:  female  :  1940  MRN:  [id number]   Visit Number:  [id number]  Admission Date:  2019  Date Of Service:  19  Primary Care Physician:  Gregorio Jackson MD     LOS: 2 days :  Patient Care Team:  Gregorio Jackson MD as PCP - General  Gregorio Jackson MD as PCP - Claims Attributed:    Chief Complaint:      POD #2 left reverse total shoulder arthroplasty for complex proximal humerus fracture.    Subjective / Interval History:     Patient alert, conversational, pain well controlled, no respiratory distress.    Review of Systems:     General ROS: No fever spike, no cognitive change.  Ophthalmic ROS: no acute visual disturbance.  ENT ROS: No sinus congestion or sore throat.   Respiratory ROS: No shortness of breath.   Cardiovascular ROS: No chest pain or palpitations.  Gastrointestinal ROS: No acute abdominal change. Non-distended.  Genito-Urinary ROS: No reported dysuria or hematuria.  Musculoskeletal ROS: No deep calf pain. No acute focal motor deficit.  Neurological ROS: Left hand diffuse tingling, patient with pain block and motor at arm, wrist and hand fully intact and normal.  Dermatological ROS: No erythema.  No cyanosis. No rash or pruritis.    Vital Signs:    Temp:  [97.3 °F (36.3 °C)-98.9 °F (37.2 °C)] 97.9 °F (36.6 °C)  Heart Rate:  [] 102  Resp:  [16-20] 20  BP: ()/(52-79) 130/73    Intake and output:    I/O last 3 completed shifts:  In: 1800 [P.O.:600; I.V.:1200]  Out: 800 [Urine:800]  I/O this shift:  In: 240 [P.O.:240]  Out: 100 [Urine:100]    Physical Examination:    General Appearance:    Alert and cooperative, no acute distress.   Head:    Atraumatic and normocephalic, without obvious abnormality.   Eyes:    Extraocular movements are within normal limits.   ENT:   No acute change.   Neck:   Supple, trachea midline.   Lungs:     Normal respirations, unlabored.     Heart:    Regular rate.   Abdomen:     Soft non-tender, non-distended.   Extremities:   No new motor deficit, no calf pain, Rochelle sign negative.   Skin:     No rash.  No cyanosis.  No erythema.  No active drainage.    Left shoulder incision line pristine.  With contact precautions, sterile dressing change done.   Neurologic:   Sensation grossly intact, Left wrist and hand radial, PIN, median, AIN and ulna motor fully intact.  Pulses intact.     Laboratory results:    Lab Results (last 24 hours)     ** No results found for the last 24 hours. **          I have reviewed the patient's laboratory results.    Radiology results:    Imaging Results (last 24 hours)     ** No results found for the last 24 hours. **          I have reviewed the patient's radiology reports.    Left shoulder views taken in recovery room shows no acute concern.  Good position and alignment of implants and/or fracture.       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    Continue current management per the detailed orders and instructions, including skin, safety and fall precautions, respiratory therapy with incentive spirometer, advance diet as tolerated, bowel regimen, multi-modal analgesia, multi-modal DVT prophylaxis, Hospitalist consult, PT/OT following post-surgical rehab protocol, and Case Management for discharge and rehabilitation placement plans.    Eldon Valadez MD  02/09/19  10:38 AM

## 2019-02-09 NOTE — PROGRESS NOTES
[facility]     LOS: 2 days    Patient Care Team:  Gregorio Jackson MD as PCP - General  Gregorio Jackson MD as PCP - Claims Attributed    Chief Complaint:  No chief complaint on file.      Subjective   I have reviewed labs/imaging/records from this hospitalization, including ER staff and admitting/attending physicians H/P's and progress notes to establish a comprehensive understanding of this patient's clinical hospital course, as well as to establish plan of care appropriately.     Patient seen and examined this morning.  Events from last night noted.  Patient denies having any fevers chills.  No nausea or vomiting no abdominal pain.  Denies any chest pain, shortness of breath or cough and sputum production.  There is no significant edema.   Patient also denies having new onset weakness of numbness of either extremity.  She is not clear about when and where she'll be going for rehabilitation.  She said her  and daughter takes care of all those issues.      Review of Systems:    The pertinent  ROS was done and it is noted above, rest  was negative.    Objective     Vital Signs  /73 (BP Location: Right arm, Patient Position: Lying)   Pulse 102   Temp 97.9 °F (36.6 °C) (Oral)   Resp 20   Ht 160 cm (63\")   Wt 54 kg (119 lb)   SpO2 (!) 81%   BMI 21.08 kg/m²       No intake/output data recorded.    Intake/Output Summary (Last 24 hours) at 2/9/2019 0851  Last data filed at 2/9/2019 0500  Gross per 24 hour   Intake 1800 ml   Output 450 ml   Net 1350 ml       Physical Exam:    General Appearance: alert, oriented x 3, no acute distress,   HEENT: Oral mucosa dry, extra occular movements intact. Sclera clear.  Skin: Warm and dry  Neck: supple, no JVD, trachea midline  Lungs:Chest shape is normal. Breath sounds heard bilaterally equally. No crackles, No wheezing.   Heart: RRR, normal S1 and S2, no S3, no rub, peripheral pulses weak but " palpable.  Abdomen: soft, non-tender,  present bowel sounds to auscultation  : no palpable bladder.  Extremities: no edema, cyanosis or clubbing.   Neuro: Able to keep a conversation, moving both upper extremities.     Results Review:    Results from last 7 days   Lab Units 02/08/19  0511   SODIUM mmol/L 138   POTASSIUM mmol/L 4.3   CHLORIDE mmol/L 106   CO2 mmol/L 26.0   BUN mg/dL 26*   CREATININE mg/dL 0.60   CALCIUM mg/dL 8.3*   GLUCOSE mg/dL 92       Estimated Creatinine Clearance: 49.4 mL/min (by C-G formula based on SCr of 0.6 mg/dL).                Results from last 7 days   Lab Units 02/08/19  0511   WBC 10*3/mm3 6.88   HEMOGLOBIN g/dL 11.1*   PLATELETS 10*3/mm3 208               Imaging Results (last 24 hours)     ** No results found for the last 24 hours. **          budesonide 0.5 mg Nebulization BID - RT   calcium-vitamin D 500 mg Oral Daily   fondaparinux 2.5 mg Subcutaneous Q24H   ipratropium-albuterol 3 mL Nebulization Q6H - RT   multivitamin with minerals 1 tablet Oral Daily   nicotine 1 patch Transdermal Q24H   PARoxetine 20 mg Oral QAM   propranolol 20 mg Oral TID   sennosides-docusate sodium 2 tablet Oral Nightly   sodium chloride 3 mL Intravenous Q12H       Bupivacaine HCl-NaCl 4-14 mL/hr Last Rate: 4 mL/hr (02/07/19 1252)   lactated ringers 100 mL/hr Last Rate: 100 mL/hr (02/08/19 1141)       Medication Review:   Current Facility-Administered Medications   Medication Dose Route Frequency Provider Last Rate Last Dose   • bisacodyl (DULCOLAX) suppository 10 mg  10 mg Rectal Daily PRN Eldon Valadez MD       • budesonide (PULMICORT) nebulizer solution 0.5 mg  0.5 mg Nebulization BID - RT Kay Sanchez APRN   0.5 mg at 02/08/19 0724   • Bupivacaine HCl-NaCl 0.125-0.9 % On-Q solution  4-14 mL/hr Peripheral Nerve Continuous Estevan Stewart CRNA 4 mL/hr at 02/07/19 1252 4 mL/hr at 02/07/19 1252   • calcium-vitamin D 500-200 MG-UNIT tablet 500 mg  500 mg Oral Daily Eldon Valadez  MD EVON   500 mg at 02/08/19 0852   • carBAMazepine (TEGretol) tablet 200 mg  200 mg Oral BID PRN Kay Sancehz APRN   200 mg at 02/08/19 1836   • docusate sodium (COLACE) capsule 100 mg  100 mg Oral BID PRN Eldon Valadez MD       • fondaparinux (ARIXTRA) injection 2.5 mg  2.5 mg Subcutaneous Q24H Eldon Valadez MD   2.5 mg at 02/08/19 0852   • HYDROcodone-acetaminophen (NORCO) 5-325 MG per tablet 1 tablet  1 tablet Oral Q8H PRN Eldon Valadez MD   1 tablet at 02/09/19 0610   • ipratropium-albuterol (DUO-NEB) nebulizer solution 3 mL  3 mL Nebulization Q6H - RT Kay Sanchez APRN   3 mL at 02/08/19 0724   • lactated ringers infusion  100 mL/hr Intravenous Continuous Eldon Valadez  mL/hr at 02/08/19 1141 100 mL/hr at 02/08/19 1141   • Morphine sulfate (PF) injection 4 mg  4 mg Intravenous Q2H PRN Eldon Valadez MD   4 mg at 02/09/19 0335    And   • naloxone (NARCAN) injection 0.4 mg  0.4 mg Intravenous Q5 Min PRN Eldon Valadez MD       • multivitamin with minerals 1 tablet  1 tablet Oral Daily Eldon Valadez MD   1 tablet at 02/08/19 0852   • nicotine (NICODERM CQ) 21 MG/24HR patch 1 patch  1 patch Transdermal Q24H Eldon Valadez MD   1 patch at 02/08/19 1836   • ondansetron (ZOFRAN) tablet 4 mg  4 mg Oral Q6H PRN Eldon Valadez MD        Or   • ondansetron ODT (ZOFRAN-ODT) disintegrating tablet 4 mg  4 mg Oral Q6H PRN Eldon Valadez MD        Or   • ondansetron (ZOFRAN) injection 4 mg  4 mg Intravenous Q6H PRN Eldon Valadez MD       • PARoxetine (PAXIL) tablet 20 mg  20 mg Oral QAM Eldon Valadez MD   20 mg at 02/09/19 0610   • propranolol (INDERAL) tablet 20 mg  20 mg Oral TID Eldon Valadez MD   20 mg at 02/08/19 1807   • sennosides-docusate sodium (SENOKOT-S) 8.6-50 MG tablet 2 tablet  2 tablet Oral Nightly Eldon Valadez MD   2 tablet at 02/08/19 2022   • sodium chloride 0.9 % flush 1-10 mL  1-10 mL Intravenous PRN  Eldon Valadez MD       • sodium chloride 0.9 % flush 3 mL  3 mL Intravenous Q12H Eldon Valadez MD   3 mL at 02/08/19 0853       Assessment/Plan         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  ·  consultation for placement.  · Likely will be able to go to a rehabilitation place if accepted by Monday.  · Surveillance lab.  · Details were discussed with the patient no family in the room.    · Further recommendations will depend on clinical course of the patient during the current hospitalization.   · I also discussed the details with the nursing staff.  · Rest as ordered.    Cameron Currie MD, FASN  02/09/19  8:51 AM    Dictated using Dragon.

## 2019-02-09 NOTE — PROGRESS NOTES
[facility]    Nerve Cath Post Op Call    Patient Name: Maria Dolores Campa  :  1940  MRN:  [id number]  Date of Discharge:     POST OP CALL    No answer. Message left to call if any questions or concerns

## 2019-02-09 NOTE — PLAN OF CARE
Problem: Patient Care Overview  Goal: Plan of Care Review  Outcome: Ongoing (interventions implemented as appropriate)   02/09/19 1425   Coping/Psychosocial   Plan of Care Reviewed With patient;daughter;family   Plan of Care Review   Progress no change     Goal: Individualization and Mutuality  Outcome: Ongoing (interventions implemented as appropriate)    Goal: Discharge Needs Assessment  Outcome: Ongoing (interventions implemented as appropriate)    Goal: Interprofessional Rounds/Family Conf  Outcome: Ongoing (interventions implemented as appropriate)      Problem: Fall Risk (Adult)  Goal: Absence of Fall  Outcome: Ongoing (interventions implemented as appropriate)      Problem: Shoulder Arthroplasty (Adult)  Goal: Signs and Symptoms of Listed Potential Problems Will be Absent, Minimized or Managed (Shoulder Arthroplasty)  Outcome: Ongoing (interventions implemented as appropriate)    Goal: Anesthesia/Sedation Recovery  Outcome: Ongoing (interventions implemented as appropriate)      Problem: Skin Injury Risk (Adult)  Goal: Identify Related Risk Factors and Signs and Symptoms  Outcome: Ongoing (interventions implemented as appropriate)    Goal: Skin Health and Integrity  Outcome: Ongoing (interventions implemented as appropriate)

## 2019-02-10 PROBLEM — Z78.9 IMPAIRED MOBILITY AND ADLS: Status: ACTIVE | Noted: 2019-02-10

## 2019-02-10 PROBLEM — Z74.09 IMPAIRED MOBILITY AND ADLS: Status: ACTIVE | Noted: 2019-02-10

## 2019-02-10 LAB
ALBUMIN SERPL-MCNC: 2.9 G/DL (ref 3.5–5)
ANION GAP SERPL CALCULATED.3IONS-SCNC: 6.2 MMOL/L (ref 10–20)
BUN BLD-MCNC: 12 MG/DL (ref 7–20)
BUN/CREAT SERPL: 30 (ref 7.1–23.5)
CALCIUM SPEC-SCNC: 8.8 MG/DL (ref 8.4–10.2)
CHLORIDE SERPL-SCNC: 104 MMOL/L (ref 98–107)
CO2 SERPL-SCNC: 30 MMOL/L (ref 26–30)
CREAT BLD-MCNC: 0.4 MG/DL (ref 0.6–1.3)
DEPRECATED RDW RBC AUTO: 44.5 FL (ref 37–54)
ERYTHROCYTE [DISTWIDTH] IN BLOOD BY AUTOMATED COUNT: 12.5 % (ref 11.5–14.5)
GFR SERPL CREATININE-BSD FRML MDRD: >150 ML/MIN/1.73
GLUCOSE BLD-MCNC: 91 MG/DL (ref 74–98)
HCT VFR BLD AUTO: 32.6 % (ref 37–47)
HGB BLD-MCNC: 10.3 G/DL (ref 12–16)
MCH RBC QN AUTO: 30.6 PG (ref 27–31)
MCHC RBC AUTO-ENTMCNC: 31.6 G/DL (ref 30–37)
MCV RBC AUTO: 96.7 FL (ref 81–99)
PHOSPHATE SERPL-MCNC: 3.1 MG/DL (ref 2.5–4.5)
PLATELET # BLD AUTO: 208 10*3/MM3 (ref 130–400)
PMV BLD AUTO: 11.2 FL (ref 6–12)
POTASSIUM BLD-SCNC: 4.2 MMOL/L (ref 3.5–5.1)
RBC # BLD AUTO: 3.37 10*6/MM3 (ref 4.2–5.4)
SODIUM BLD-SCNC: 136 MMOL/L (ref 137–145)
WBC NRBC COR # BLD: 6.21 10*3/MM3 (ref 4.8–10.8)

## 2019-02-10 PROCEDURE — 25010000002 FONDAPARINUX PER 0.5 MG: Performed by: ORTHOPAEDIC SURGERY

## 2019-02-10 PROCEDURE — 80069 RENAL FUNCTION PANEL: CPT | Performed by: INTERNAL MEDICINE

## 2019-02-10 PROCEDURE — 97116 GAIT TRAINING THERAPY: CPT

## 2019-02-10 PROCEDURE — 85027 COMPLETE CBC AUTOMATED: CPT | Performed by: INTERNAL MEDICINE

## 2019-02-10 PROCEDURE — 97530 THERAPEUTIC ACTIVITIES: CPT

## 2019-02-10 PROCEDURE — 99232 SBSQ HOSP IP/OBS MODERATE 35: CPT | Performed by: FAMILY MEDICINE

## 2019-02-10 PROCEDURE — 94799 UNLISTED PULMONARY SVC/PX: CPT

## 2019-02-10 RX ADMIN — NICOTINE 1 PATCH: 21 PATCH TRANSDERMAL at 16:34

## 2019-02-10 RX ADMIN — DOCUSATE SODIUM 100 MG: 100 CAPSULE, LIQUID FILLED ORAL at 09:28

## 2019-02-10 RX ADMIN — FONDAPARINUX SODIUM 2.5 MG: 2.5 INJECTION, SOLUTION SUBCUTANEOUS at 09:28

## 2019-02-10 RX ADMIN — HYDROCODONE BITARTRATE AND ACETAMINOPHEN 1 TABLET: 5; 325 TABLET ORAL at 02:33

## 2019-02-10 RX ADMIN — CARBAMAZEPINE 200 MG: 200 TABLET ORAL at 21:08

## 2019-02-10 RX ADMIN — CARBAMAZEPINE 200 MG: 200 TABLET ORAL at 09:29

## 2019-02-10 RX ADMIN — BUDESONIDE 0.5 MG: 0.5 INHALANT RESPIRATORY (INHALATION) at 07:18

## 2019-02-10 RX ADMIN — HYDROCODONE BITARTRATE AND ACETAMINOPHEN 1 TABLET: 5; 325 TABLET ORAL at 21:08

## 2019-02-10 RX ADMIN — IPRATROPIUM BROMIDE AND ALBUTEROL SULFATE 3 ML: .5; 3 SOLUTION RESPIRATORY (INHALATION) at 07:18

## 2019-02-10 RX ADMIN — CALCIUM CARBONATE 500 MG (1,250 MG)-VITAMIN D3 200 UNIT TABLET 500 MG: at 09:28

## 2019-02-10 RX ADMIN — SODIUM CHLORIDE, POTASSIUM CHLORIDE, SODIUM LACTATE AND CALCIUM CHLORIDE 100 ML/HR: 600; 310; 30; 20 INJECTION, SOLUTION INTRAVENOUS at 09:29

## 2019-02-10 RX ADMIN — SODIUM CHLORIDE, PRESERVATIVE FREE 3 ML: 5 INJECTION INTRAVENOUS at 09:28

## 2019-02-10 RX ADMIN — PROPRANOLOL HYDROCHLORIDE 20 MG: 10 TABLET ORAL at 11:43

## 2019-02-10 RX ADMIN — PAROXETINE HYDROCHLORIDE 20 MG: 20 TABLET, FILM COATED ORAL at 06:49

## 2019-02-10 RX ADMIN — SENNOSIDES AND DOCUSATE SODIUM 2 TABLET: 8.6; 5 TABLET ORAL at 21:08

## 2019-02-10 RX ADMIN — MULTIPLE VITAMINS W/ MINERALS TAB 1 TABLET: TAB at 09:28

## 2019-02-10 RX ADMIN — CARBAMAZEPINE 200 MG: 200 TABLET ORAL at 02:33

## 2019-02-10 RX ADMIN — PROPRANOLOL HYDROCHLORIDE 20 MG: 10 TABLET ORAL at 06:49

## 2019-02-10 RX ADMIN — PROPRANOLOL HYDROCHLORIDE 20 MG: 10 TABLET ORAL at 16:34

## 2019-02-10 NOTE — PROGRESS NOTES
[facility] HOSPITALIST    PROGRESS NOTE    Name:  Maria Dolores Campa   Age:  78 y.o.  Sex:  female  :  1940  MRN:  [id number]   Visit Number:  [id number]  Admission Date:  2019  Date Of Service:  02/10/19  Primary Care Physician:  Gregorio Jackson MD     LOS: 3 days :      Chief Complaint:  Follow-up shoulder surgery        Subjective / Interval History: The patient is a 78-year-old lady post left total shoulder arthroplasty for complex proximal humerus fracture, admitted to the hospital.  The patient normally sees Dr. Jackson and he is consulted for medical management and the hospitalist service is covering for him today.  Her anemia is stable.  No acute issues occurred overnight.  Reports eating and drinking.  She is generally weak.  No family at the bedside.      Review of Systems:     General ROS: Patient denies any fevers, chills or loss of consciousness.  Positive generalized weakness  Ophthalmic ROS: Denies any diplopia or transient loss of vision.  ENT ROS: Denies sore throat, nasal congestion or ear pain.   Respiratory ROS: Denies cough or shortness of breath.  Cardiovascular ROS: Denies chest pain or palpitations. No history of exertional chest pain.   Gastrointestinal ROS: Denies nausea and vomiting. Denies any abdominal pain. No diarrhea.  Genito-Urinary ROS: Denies dysuria or hematuria.  Musculoskeletal ROS: Complains of left shoulder controlled postop pain. No muscle pain. No calf pain.  Neurological ROS: Denies any focal weakness. No loss of consciousness. Denies any numbness. Denies neck pain.   Dermatological ROS: Denies any redness or pruritis.    Vital Signs:    Temp:  [97.6 °F (36.4 °C)-98.2 °F (36.8 °C)] 97.9 °F (36.6 °C)  Heart Rate:  [72-94] 72  Resp:  [16-17] 17  BP: (111-121)/(64-73) 117/64    Intake and output:    I/O last 3 completed shifts:  In: 540 [P.O.:540]  Out: 850 [Urine:850]  I/O this shift:  In: 300 [P.O.:300]  Out: 175  [Urine:175]    Physical Examination:    General Appearance:   Asleep on my arrival.  Easily awakened.  Alert and cooperative, not in any acute distress. elderly lady    Head:    Atraumatic and normocephalic   Eyes:            PERRLA, no icterus.  EOMI    Throat:   No oral lesions, no thrush, oral mucosa moist.   Neck:   Supple, trachea midline, no thyromegaly   Lungs:     Chest shape is normal. Breath sounds heard bilaterally equally.  No crackles or wheezing. No Pleural rub or bronchial breathing.    Heart:    Normal S1 and S2, no murmur   Abdomen:     Soft, nontender, nondistended    Extremities:   Left arm in a sling, no edema, no cyanosis, no             clubbing   Skin:   No bleeding, or rash; left arm yellow bruising appropriate postop; postop area is clean dry and intact with bandage in place    Neurologic:   No tremor, sensation intact, cooperative and pleasant    Laboratory results:    Lab Results (last 24 hours)     Procedure Component Value Units Date/Time    Renal Function Panel [[id number]]  (Abnormal) Collected:  02/10/19 0514    Specimen:  Blood Updated:  02/10/19 0605     Glucose 91 mg/dL      BUN 12 mg/dL      Creatinine 0.40 mg/dL      Sodium 136 mmol/L      Potassium 4.2 mmol/L      Chloride 104 mmol/L      CO2 30.0 mmol/L      Calcium 8.8 mg/dL      Albumin 2.90 g/dL      Phosphorus 3.1 mg/dL      Anion Gap 6.2 mmol/L      BUN/Creatinine Ratio 30.0     eGFR Non African Amer >150 mL/min/1.73     Narrative:       The MDRD GFR formula is only valid for adults with stable renal function between ages 18 and 70.    CBC (No Diff) [[id number]]  (Abnormal) Collected:  02/10/19 0514    Specimen:  Blood Updated:  02/10/19 0546     WBC 6.21 10*3/mm3      RBC 3.37 10*6/mm3      Hemoglobin 10.3 g/dL      Hematocrit 32.6 %      MCV 96.7 fL      MCH 30.6 pg      MCHC 31.6 g/dL      RDW 12.5 %      RDW-SD 44.5 fl      MPV 11.2 fL      Platelets 208 10*3/mm3           I have reviewed the patient's laboratory  results.    Radiology results:    Imaging Results (last 24 hours)     ** No results found for the last 24 hours. **          I have reviewed the patient's radiology reports.    Medication Review:     I have reviewed the patients active and prn medications.     Assessment:      Postop left shoulder arthroplasty for humerus fracture  Multiple sclerosis  Frequent falls  Debility  Acute functional decline      Plan:  The patient awake acute rehabilitation placement.  No need to adjust any medications at this time.  Continue other orders per Dr. Valadez.  Dr. Jackson will follow in consultation again starting tomorrow on an as-needed basis.  Case management is following.    The patient requested Dr. Valadez reviewed the postop x-rays with her when possible and I did let him know about this.    Medication risks and benefits were discussed in detail. Patient reported satisfaction with care delivered and treatment plan.     Argelia Chi DO  02/10/19  1:14 PM

## 2019-02-10 NOTE — PROGRESS NOTES
[facility]    Acute pain service Inpatient Progress Note    Patient Name: Maria Dolores Campa  :  1940  MRN:  [id number]        Acute Pain  Service Inpatient Progress Note:    Analgesia:Good  Pain Score:3/10  LOC: alert and awake  Resp Status: supplemental oxygen  Cardiac: VS stable  Side Effects:None  Catheter Site:clean, dry and dressing intact  Cath type: peripheral nerve cath with ON Q  Infusion rate: 4ml/hr  Catheter Plan:Catheter to remain Insitu and Continue catheter infusion rate unchanged

## 2019-02-10 NOTE — PLAN OF CARE
Problem: Fall Risk (Adult)  Goal: Absence of Fall  Outcome: Ongoing (interventions implemented as appropriate)   02/10/19 0029   Fall Risk (Adult)   Absence of Fall making progress toward outcome       Problem: Shoulder Arthroplasty (Adult)  Goal: Signs and Symptoms of Listed Potential Problems Will be Absent, Minimized or Managed (Shoulder Arthroplasty)  Outcome: Ongoing (interventions implemented as appropriate)   02/10/19 0029   Goal/Outcome Evaluation   Problems Assessed (Shoulder Arthro) pain;functional deficit   Problems Present (Shoulder Arthro) pain;functional deficit     Goal: Anesthesia/Sedation Recovery  Outcome: Ongoing (interventions implemented as appropriate)   02/10/19 0029   Goal/Outcome Evaluation   Anesthesia/Sedation Recovery recovered to baseline       Problem: Skin Injury Risk (Adult)  Goal: Skin Health and Integrity  Outcome: Ongoing (interventions implemented as appropriate)   02/10/19 0029   Skin Injury Risk (Adult)   Skin Health and Integrity making progress toward outcome

## 2019-02-10 NOTE — THERAPY TREATMENT NOTE
Acute Care - Physical Therapy Treatment Note  [facility]     Patient Name: Maria Dolores Campa  : 1940  MRN: [id number]  Today's Date: 2/10/2019  Onset of Illness/Injury or Date of Surgery: 19  Date of Referral to PT: 19  Referring Physician: Dr. Valadez    Admit Date: 2019    Visit Dx:    ICD-10-CM ICD-9-CM   1. Impaired mobility and ADLs Z74.09 799.89   2. 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 Impaired functional mobility, balance, gait, and endurance Z74.09 V49.89     Patient Active Problem List   Diagnosis   • Urinary tract infection without hematuria   • Acute exacerbation of chronic obstructive pulmonary disease (COPD) (CMS/[facility])   • Moderate malnutrition (CMS/[facility])   • Acute metabolic encephalopathy   • MS (multiple sclerosis) (CMS/[facility])   • Tobacco abuse   • COPD (chronic obstructive pulmonary disease) (CMS/[facility])   • Fx humeral neck, left, sequela   • Declining functional status   • Frequent falls   • Dehydration   • 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, left   • Impaired mobility and ADLs       Therapy Treatment    Rehabilitation Treatment Summary     Row Name 02/10/19 1242             Treatment Time/Intention    Discipline  physical therapy assistant  -CC      Document Type  therapy note (daily note)  -CC      Subjective Information  complains of;weakness;fatigue  -CC      Mode of Treatment  individual therapy  -CC      Care Plan Review  care plan/treatment goals reviewed  -CC      Patient Effort  good  -CC      Existing Precautions/Restrictions  fall;non-weight bearing;left;shoulder  -CC      Recorded by [CC] Kim Reyes, PTA 02/10/19 1657      Row Name 02/10/19 1242             Bed Mobility Assessment/Treatment    Bed Mobility Assessment/Treatment  supine-sit  -CC      Supine-Sit [place] (Bed Mobility)  contact guard;verbal cues  -CC      Bed Mobility, Safety Issues  decreased use of arms for  pushing/pulling  -CC      Recorded by [CC] Kim Reyes, PTA 02/10/19 1657      Row Name 02/10/19 1242             Transfer Assessment/Treatment    Transfer Assessment/Treatment  sit-stand transfer;stand-sit transfer  -CC      Recorded by [CC] Kim Reyes, PTA 02/10/19 1657      Row Name 02/10/19 1242             Sit-Stand Transfer    Sit-Stand Ray (Transfers)  minimum assist (75% patient effort);contact guard;verbal cues  -CC      Assistive Device (Sit-Stand Transfers)  -- HHA  -CC      Recorded by [CC] Kim Reyes, PTA 02/10/19 1657      Row Name 02/10/19 1242             Stand-Sit Transfer    Stand-Sit Ray (Transfers)  minimum assist (75% patient effort);contact guard;verbal cues  -CC      Assistive Device (Stand-Sit Transfers)  -- HHA  -CC      Recorded by [CC] Kim Reyes, PTA 02/10/19 1657      Row Name 02/10/19 1242             Toilet Transfer    Type (Toilet Transfer)  sit-stand;stand-sit  -CC      Ray Level (Toilet Transfer)  minimum assist (75% patient effort);contact guard;verbal cues  -CC      Assistive Device (Toilet Transfer)  commode, bedside without drop arms HHA  -CC      Recorded by [CC] Kim Reyes, PTA 02/10/19 1657      Row Name 02/10/19 1242             Gait/Stairs Assessment/Training    57696 - Gait Training Minutes   16  -CC      Gait/Stairs Assessment/Training  gait/ambulation independence  -CC      Ray Level (Gait)  minimum assist (75% patient effort);contact guard HHA  -CC      Assistive Device (Gait)  -- HHA  -CC      Distance in Feet (Gait)  5 +12  -CC      Pattern (Gait)  step-to  -CC      Deviations/Abnormal Patterns (Gait)  base of support, narrow;senthil decreased;gait speed decreased;stride length decreased  -CC      Bilateral Gait Deviations  heel strike decreased;weight shift ability decreased  -CC      Recorded by [CC] Kim Reyes, PTA 02/10/19 1657      Row Name 02/10/19 1242             Motor Skills  Assessment/Interventions    Additional Documentation  Therapeutic Exercise (Group)  -CC      Recorded by [CC] Kim Reyes, VANI 02/10/19 1704      Row Name 02/10/19 1242             Therapeutic Exercise    93586 - PT Therapeutic Activity Minutes  29  -CC      Recorded by [CC] Kim Reyes, PTA 02/10/19 1704      Row Name 02/10/19 1242             Positioning and Restraints    Pre-Treatment Position  in bed  -CC      Post Treatment Position  chair  -CC      In Chair  reclined;call light within reach;encouraged to call for assist  -CC      Recorded by [CC] Kim Reyes, PTA 02/10/19 1657      Row Name 02/10/19 1242             Pain Scale: Numbers Pre/Post-Treatment    Pain Scale: Numbers, Pretreatment  2/10  -CC      Pain Scale: Numbers, Post-Treatment  1/10  -CC      Pain Location - Side  Left  -CC      Pain Location - Orientation  upper  -CC      Pain Location  extremity  -CC      Pain Intervention(s)  Repositioned  -CC      Recorded by [CC] Kim Reyes PTA 02/10/19 1657      Row Name                Wound 02/07/19 0917 Left shoulder incision    Wound - Properties Group Date first assessed: 02/07/19 [JS] Time first assessed: 0917 [JS] Side: Left [JS] Location: shoulder [JS] Type: incision [JS] Recorded by:  [JS] Argelia Martinez RN 02/07/19 0917    Row Name 02/10/19 1242             Outcome Summary/Treatment Plan (PT)    Daily Summary of Progress (PT)  progress toward functional goals is good  -CC      Plan for Continued Treatment (PT)  on't with PT POC and progress as pt tolerates  -CC      Recorded by [CC] Kim Reyes PTA 02/10/19 1657        User Key  (r) = Recorded By, (t) = Taken By, (c) = Cosigned By    Initials Name Effective Dates Discipline     Argelia Martinez RN 07/14/16 -  Nurse    CC Kim Reyes PTA 03/07/18 -  PT          Wound 02/07/19 0917 Left shoulder incision (Active)   Dressing Appearance dry;intact;no drainage 2/10/2019  8:00 AM   Closure Staples 2/10/2019   8:00 AM   Drainage Amount none 2/10/2019  8:00 AM           Physical Therapy Education     Title: PT OT SLP Therapies (In Progress)     Topic: Physical Therapy (Done)     Point: Mobility training (Done)     Learning Progress Summary           Patient Acceptance, E,TB, VU by CC at 2/10/2019  4:57 PM    Comment:  Importance of increasing amb daily    Acceptance, E,TB, VU by  at 2/8/2019  1:40 PM    Comment:  Purpose of PT/POC.                   Point: Home exercise program (Done)     Learning Progress Summary           Patient Acceptance, E,TB, VU by  at 2/8/2019  1:40 PM    Comment:  Purpose of PT/POC.                   Point: Precautions (Done)     Learning Progress Summary           Patient Acceptance, E,TB, VU by  at 2/8/2019  1:40 PM    Comment:  Purpose of PT/POC.                               User Key     Initials Effective Dates Name Provider Type Discipline     04/03/18 -  Nila Torrez, PT Physical Therapist PT    CC 03/07/18 -  Kim Reyes, PTA Physical Therapy Assistant PT                PT Recommendation and Plan     Outcome Summary/Treatment Plan (PT)  Daily Summary of Progress (PT): progress toward functional goals is good  Plan for Continued Treatment (PT): on't with PT POC and progress as pt tolerates  Plan of Care Reviewed With: patient  Progress: improving  Outcome Summary: Pt performed bed mobility and transfers with slight decreased assist and pt's amb limited d/t fear of falling   Outcome Measures     Row Name 02/10/19 1242 02/08/19 0924 02/08/19 0921       How much help from another person do you currently need...    Turning from your back to your side while in flat bed without using bedrails?  4  -CC  --  3  -LM    Moving from lying on back to sitting on the side of a flat bed without bedrails?  3  -CC  --  3  -LM    Moving to and from a bed to a chair (including a wheelchair)?  3  -CC  --  3  -LM    Standing up from a chair using your arms (e.g., wheelchair, bedside chair)?  3   -CC  --  3  -LM    Climbing 3-5 steps with a railing?  2  -CC  --  1  -LM    To walk in hospital room?  3  -CC  --  3  -LM    AM-PAC 6 Clicks Score  18  -CC  --  16  -LM       How much help from another is currently needed...    Putting on and taking off regular lower body clothing?  --  3  -AH  --    Bathing (including washing, rinsing, and drying)  --  3  -AH  --    Toileting (which includes using toilet bed pan or urinal)  --  3  -AH  --    Putting on and taking off regular upper body clothing  --  3  -AH  --    Taking care of personal grooming (such as brushing teeth)  --  3  -AH  --    Eating meals  --  3  -AH  --    Score  --  18  -AH  --       Functional Assessment    Outcome Measure Options  AM-[facility] 6 Clicks Basic Mobility (PT)  -CC  AM-[facility] 6 Clicks Daily Activity (OT)  -  AM-[facility] 6 Clicks Basic Mobility (PT)  -      User Key  (r) = Recorded By, (t) = Taken By, (c) = Cosigned By    Initials Name Provider Type     Deb Blum Occupational Therapist    LM Nila Torrez, PT Physical Therapist    CC Kim Reyes, VANI Physical Therapy Assistant         Time Calculation:   PT Charges     Row Name 02/10/19 1242             Time Calculation    Start Time  1242  -CC      PT Received On  02/10/19  -      PT Goal Re-Cert Due Date  02/18/19  -         Time Calculation- PT    Total Timed Code Minutes- PT  45 minute(s)  -         Timed Charges    09213 - Gait Training Minutes   16  -CC      95455 - PT Therapeutic Activity Minutes  29  -CC        User Key  (r) = Recorded By, (t) = Taken By, (c) = Cosigned By    Initials Name Provider Type    Kim Quezada, VANI Physical Therapy Assistant        Therapy Suggested Charges     Code   Minutes Charges    74658 (CPT®) Hc Pt Neuromusc Re Education Ea 15 Min      80794 (CPT®) Hc Pt Ther Proc Ea 15 Min      10785 (CPT®) Hc Gait Training Ea 15 Min 16 1    25243 (CPT®) Hc Pt Therapeutic Act Ea 15 Min 29 2    88515 (CPT®) Hc Pt Manual Therapy Ea 15 Min       15265 (CPT®) Hc Pt Iontophoresis Ea 15 Min      49614 (CPT®) Hc Pt Elec Stim Ea-Per 15 Min      07092 (CPT®) Hc Pt Ultrasound Ea 15 Min      68598 (CPT®) Hc Pt Self Care/Mgmt/Train Ea 15 Min      93179 (CPT®) Hc Pt Prosthetic (S) Train Initial Encounter, Each 15 Min      29361 (CPT®) Hc Pt Orthotic(S)/Prosthetic(S) Encounter, Each 15 Min      71869 (CPT®) Hc Orthotic(S) Mgmt/Train Initial Encounter, Each 15min      Total  45 3        Therapy Charges for Today     Code Description Service Date Service Provider Modifiers Qty    32574186592 HC GAIT TRAINING EA 15 MIN 2/10/2019 Kim Reyes, PTA GP 1    74422957164 HC GAIT TRAINING EA 15 MIN 2/10/2019 Kim Reyes, PTA GP 1    91260011208 HC PT THERAPEUTIC ACT EA 15 MIN 2/10/2019 Kim Reyes, PTA GP 2          PT G-Codes  Outcome Measure Options: AM-PAC 6 Clicks Basic Mobility (PT)  AM-PAC 6 Clicks Score: 18  Score: 18    Kim Reyes PTA  2/10/2019

## 2019-02-10 NOTE — PLAN OF CARE
Problem: Patient Care Overview  Goal: Plan of Care Review  Outcome: Ongoing (interventions implemented as appropriate)   02/10/19 1644   Coping/Psychosocial   Plan of Care Reviewed With patient     Goal: Individualization and Mutuality  Outcome: Ongoing (interventions implemented as appropriate)    Goal: Discharge Needs Assessment  Outcome: Ongoing (interventions implemented as appropriate)    Goal: Interprofessional Rounds/Family Conf  Outcome: Ongoing (interventions implemented as appropriate)      Problem: Fall Risk (Adult)  Goal: Absence of Fall  Outcome: Ongoing (interventions implemented as appropriate)      Problem: Shoulder Arthroplasty (Adult)  Goal: Signs and Symptoms of Listed Potential Problems Will be Absent, Minimized or Managed (Shoulder Arthroplasty)  Outcome: Ongoing (interventions implemented as appropriate)    Goal: Anesthesia/Sedation Recovery  Outcome: Ongoing (interventions implemented as appropriate)      Problem: Skin Injury Risk (Adult)  Goal: Identify Related Risk Factors and Signs and Symptoms  Outcome: Ongoing (interventions implemented as appropriate)    Goal: Skin Health and Integrity  Outcome: Ongoing (interventions implemented as appropriate)

## 2019-02-10 NOTE — PLAN OF CARE
Problem: Patient Care Overview  Goal: Plan of Care Review  Outcome: Ongoing (interventions implemented as appropriate)   02/10/19 1242   Coping/Psychosocial   Plan of Care Reviewed With patient   OTHER   Outcome Summary Pt performed bed mobility and transfers with slight decreased assist and pt's amb limited d/t fear of falling    Plan of Care Review   Progress improving

## 2019-02-10 NOTE — SIGNIFICANT NOTE
02/09/19 1946   PT Deferred Reason   PT Deferred Reason Patient/family declined treatment, not feeling well  (Pt with c/o headach [id number] hold until tomorrow)

## 2019-02-11 VITALS
SYSTOLIC BLOOD PRESSURE: 103 MMHG | WEIGHT: 121.8 LBS | RESPIRATION RATE: 18 BRPM | BODY MASS INDEX: 21.58 KG/M2 | TEMPERATURE: 98.1 F | OXYGEN SATURATION: 95 % | HEIGHT: 63 IN | DIASTOLIC BLOOD PRESSURE: 62 MMHG | HEART RATE: 66 BPM

## 2019-02-11 PROCEDURE — 94799 UNLISTED PULMONARY SVC/PX: CPT

## 2019-02-11 PROCEDURE — 25010000002 FONDAPARINUX PER 0.5 MG: Performed by: ORTHOPAEDIC SURGERY

## 2019-02-11 RX ORDER — HYDROCODONE BITARTRATE AND ACETAMINOPHEN 5; 325 MG/1; MG/1
1 TABLET ORAL EVERY 6 HOURS PRN
Qty: 120 TABLET | Refills: 0 | Status: SHIPPED | OUTPATIENT
Start: 2019-02-11 | End: 2019-02-28

## 2019-02-11 RX ORDER — IPRATROPIUM BROMIDE AND ALBUTEROL SULFATE 2.5; .5 MG/3ML; MG/3ML
3 SOLUTION RESPIRATORY (INHALATION) 4 TIMES DAILY PRN
Qty: 360 ML | Refills: 3 | Status: SHIPPED | OUTPATIENT
Start: 2019-02-11

## 2019-02-11 RX ORDER — BUDESONIDE 0.5 MG/2ML
0.5 INHALANT ORAL
Qty: 180 EACH | Refills: 3 | Status: SHIPPED | OUTPATIENT
Start: 2019-02-11

## 2019-02-11 RX ADMIN — CALCIUM CARBONATE 500 MG (1,250 MG)-VITAMIN D3 200 UNIT TABLET 500 MG: at 09:46

## 2019-02-11 RX ADMIN — SODIUM CHLORIDE, PRESERVATIVE FREE 3 ML: 5 INJECTION INTRAVENOUS at 09:46

## 2019-02-11 RX ADMIN — PROPRANOLOL HYDROCHLORIDE 20 MG: 10 TABLET ORAL at 11:49

## 2019-02-11 RX ADMIN — MULTIPLE VITAMINS W/ MINERALS TAB 1 TABLET: TAB at 09:46

## 2019-02-11 RX ADMIN — SODIUM CHLORIDE, POTASSIUM CHLORIDE, SODIUM LACTATE AND CALCIUM CHLORIDE 100 ML/HR: 600; 310; 30; 20 INJECTION, SOLUTION INTRAVENOUS at 06:54

## 2019-02-11 RX ADMIN — BUDESONIDE 0.5 MG: 0.5 INHALANT RESPIRATORY (INHALATION) at 07:24

## 2019-02-11 RX ADMIN — IPRATROPIUM BROMIDE AND ALBUTEROL SULFATE 3 ML: .5; 3 SOLUTION RESPIRATORY (INHALATION) at 07:24

## 2019-02-11 RX ADMIN — HYDROCODONE BITARTRATE AND ACETAMINOPHEN 1 TABLET: 5; 325 TABLET ORAL at 16:27

## 2019-02-11 RX ADMIN — PAROXETINE HYDROCHLORIDE 20 MG: 20 TABLET, FILM COATED ORAL at 06:53

## 2019-02-11 RX ADMIN — CARBAMAZEPINE 200 MG: 200 TABLET ORAL at 11:49

## 2019-02-11 RX ADMIN — FONDAPARINUX SODIUM 2.5 MG: 2.5 INJECTION, SOLUTION SUBCUTANEOUS at 09:46

## 2019-02-11 RX ADMIN — PROPRANOLOL HYDROCHLORIDE 20 MG: 10 TABLET ORAL at 06:53

## 2019-02-11 RX ADMIN — HYDROCODONE BITARTRATE AND ACETAMINOPHEN 1 TABLET: 5; 325 TABLET ORAL at 06:53

## 2019-02-11 NOTE — PROGRESS NOTES
Case Management Discharge Note         Destination      No service has been selected for the patient.      Durable Medical Equipment      No service has been selected for the patient.      Dialysis/Infusion      No service has been selected for the patient.      Home Medical Care      No service has been selected for the patient.      Community Resources      No service has been selected for the patient.             Final Discharge Disposition Code: 03 - skilled nursing facility (SNF)   Via car

## 2019-02-11 NOTE — PROGRESS NOTES
[facility] Planning [facility]     Patient Name: Maria Dolores Campa  MRN: [id number]  Today's Date: 2/11/2019    Admit Date: 2/7/2019    Discharge Needs Assessment    No documentation.       Discharge Plan     Row Name 02/11/19 1116       Plan    Plan Visited patient and family in room to follow up if they had made their decision on which rehab facility they have chosen as the patient has had a DC order since this morning. They advised they have not made a decision yet.   Offered to answer any questions they may have and this CM was asked to leave the room and they would notify CM when they made a decision.     Row Name 02/11/19 7134       Plan    Plan  Spoke to daughter Peace and she is touring [facility] this morning and will make decision after.  [facility] has accepted patient if daughter chooses them.     Patient/Family in Agreement with Plan  yes        Destination      Service Provider Request Status Selected Services Address Phone Number Fax Number    [facility] - [facility] N/A [address] [facility] [address] [phone]    [facility] - Request Sent N/A [address] [address] [phone]    [facility] Pending - Request Sent N/A [address] [facility] [address] [phone]      Durable Medical Equipment      No service coordination in this encounter.      Dialysis/Infusion      No service coordination in this encounter.      Home Medical Care      No service coordination in this encounter.      Community Resources      No service coordination in this encounter.        Expected Discharge Date and Time     Expected Discharge Date Expected Discharge Time    Feb 11, 2019         Demographic Summary    No documentation.       Functional Status    No documentation.       Psychosocial    No documentation.       Abuse/Neglect    No documentation.       Legal     No documentation.       Substance Abuse    No documentation.       Patient Forms    No documentation.           Nina Rojas

## 2019-02-11 NOTE — PROGRESS NOTES
Discharge Planning Assessment  [facility]     Patient Name: Maria Dolores Campa  MRN: [id number]  Today's Date: 2/11/2019    Admit Date: 2/7/2019    Discharge Needs Assessment    No documentation.       Discharge Plan     Row Name 02/11/19 1129       Plan    Plan  Patient and family has chosen [facility].  Patient's bed will not be available until early afternoon per Candace at [facility].  Nurse to call report to [facility] [phone] and fax DC report to [phone]. Family to transport.     Patient/Family in Agreement with Plan  yes    Row Name 02/11/19 1116       Plan    Plan  Visited patient and family in room.  Asked if they had made their decision on which facility as the patient has had a DC order since this morning and they advised they have not made a decision yet.   Offered to answer any questions they may have and this CM was asked to leave the room and they would notify CM when they made a decision.     Row Name 02/11/19 6981       Plan    Plan  Spoke to daughter Peace and she is touring [facility] this morning and will make decision after.  [facility] has accepted patient if daughter chooses them.     Patient/Family in Agreement with Plan  yes        Destination      Service Provider Request Status Selected Services Address Phone Number Fax Number    [facility] Pending - [facility] Sent N/A [address] [address] [phone]    [facility] - [facility] Sent N/A [address] [facility] [address] [phone]    [facility] Pending - Request Sent N/A [address] [facility] [address] [phone]      Durable Medical Equipment      No service coordination in this encounter.      Dialysis/Infusion      No service coordination in this encounter.      Home Medical Care      No service coordination in this encounter.      Community Resources      No service coordination in this encounter.        Expected  Discharge Date and Time     Expected Discharge Date Expected Discharge Time    Feb 11, 2019         Demographic Summary    No documentation.       Functional Status    No documentation.       Psychosocial    No documentation.       Abuse/Neglect    No documentation.       Legal    No documentation.       Substance Abuse    No documentation.       Patient Forms    No documentation.           Nina Rojas

## 2019-02-11 NOTE — PROGRESS NOTES
[facility]    Acute pain service Inpatient Progress Note    Patient Name: Maria Dolores Campa  :  1940  MRN:  [id number]        Acute Pain  Service Inpatient Progress Note:    Analgesia:Excellent  Pain Score:0/10  LOC: alert and awake  Resp Status: room air  Cardiac: VS stable  Side Effects:None  Catheter Site:clean, dry and dressing intact  Cath type: peripheral nerve cath with ON Q  Infusion rate: 4ml/hr  Catheter Plan:RN to remove catheter and Patient to be discharged home

## 2019-02-11 NOTE — PROGRESS NOTES
Discharge Planning Assessment   [facility]     Patient Name: Maria Dolores Campa  MRN: [id number]  Today's Date: 2/11/2019    Admit Date: 2/7/2019    Discharge Needs Assessment    No documentation.       Discharge Plan     Row Name 02/11/19 0930       Plan    Plan Spoke to daughter Peace and she is touring [employer] this morning and will make decision after.  [facility] has accepted patient if daughter chooses them.     Patient/Family in Agreement with Plan  yes        Destination      Service Provider Request Status Selected Services Address Phone Number Fax Number    [facility] Pending - Request Sent N/A [address] [facility] [address] [phone]    [facility] Pending - Request Sent N/A [address] [facility] [address] [phone]    [facility] Pending - Request Sent N/A [address] [facility] [address] [phone]      Durable Medical Equipment      No service coordination in this encounter.      Dialysis/Infusion      No service coordination in this encounter.      Home Medical Care      No service coordination in this encounter.      Community Resources      No service coordination in this encounter.        Expected Discharge Date and Time     Expected Discharge Date Expected Discharge Time    Feb 11, 2019         Demographic Summary    No documentation.       Functional Status    No documentation.       Psychosocial    No documentation.       Abuse/Neglect    No documentation.       Legal    No documentation.       Substance Abuse    No documentation.       Patient Forms    No documentation.           Nina Rojas

## 2019-02-11 NOTE — DISCHARGE SUMMARY
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 female presented with history of multiple sclerosis and impaired balance.  She had fallen resulting in a left proximal humerus fracture and is admitted for operative management of the unstable 4 part fracture with delayed union.  Vision there is a long history of cigarette smoking obstructive lung disease.  He was admitted by orthopedic surgery underwent operative repair but remained dependent on oxygen with generalized weakness and continued impaired ambulation and high risk for falls she was advised to proceed with rehabilitation placement she is agreed but reluctantly.  At discharge her O2 saturation is 90% on nasal cannula oxygen pulse 70 respirations 16 blood pressure 107/57 temperature 97.9.    Pertinent Test Results:  On February 10 the white blood cell count was 6.21 hemoglobin 10.3 glucose 91 BUN 12 creatinine 0.4 sodium 136 potassium 4.2  Shoulder films showed postoperative changes CT of the head showed chronic small vessel ischemic change in no acute intracranial abnormality; S x-ray showed elevation of the hemidiaphragm on the left  Physical Exam:   Head appeared nontraumatic the chest reveal distant breath sounds cardiac regular rhythm no murmur or S3 abdomen soft nontender bowel sounds normal there is a dressing and  sling involving the left shoulder    Discharge Disposition  Skilled Nursing Facility (DC - External)    Discharge Medications     Discharge Medications      New Medications      Instructions Start Date   budesonide 0.5 MG/2ML nebulizer solution  Commonly known as:  PULMICORT   0.5 mg, Nebulization, 2 Times Daily - RT73      ipratropium-albuterol 0.5-2.5 mg/3 ml nebulizer  Commonly known as:  DUO-NEB   3 mL, Nebulization, 4 Times Daily PRN         Changes to Medications      Instructions Start Date   HYDROcodone-acetaminophen 5-325 MG per tablet  Commonly known as:  NORCO  What changed:  when to take this   1 tablet, Oral, Every 6 Hours PRN         Continue These Medications      Instructions Start Date   carBAMazepine 200 MG tablet  Commonly known as:  TEGretol   200 mg, Oral, 2 Times Daily      EXCEDRIN TENSION HEADACHE 500-65 MG tablet  Generic drug:  Acetaminophen-Caffeine   1 tablet, Oral, As Needed      MULTIVITAMIN ADULTS PO   1 tablet, Oral, Daily      nicotine 21 MG/24HR patch  Commonly known as:  NICODERM CQ   1 patch, Transdermal, Every 24 Hours      PARoxetine 20 MG tablet  Commonly known as:  PAXIL   20 mg, Oral, Every Morning      propranolol 20 MG tablet  Commonly known as:  INDERAL   20 mg, Oral, 3 Times Daily      sennosides-docusate sodium 8.6-50 MG tablet  Commonly known as:  SENOKOT-S   2 tablets, Oral, Nightly         Stop These Medications    Calcium 600-400 MG-UNIT chewable tablet     levoFLOXacin 500 MG tablet  Commonly known as:  BERKLEY        Have advised her to proceed with rehabilitation placement at [facility] where a fall prevention decubitus prevention and aspiration prevention measures should be employed she should be up in a chair at least 3 times per day she is to PT seen by OT PT ST treated as appropriate.  He is to use continuous nasal cannula oxygen and titrate to maintain O2 saturations greater than 90% complete cessation of cigarette smoking is advised I would recommend a CBC and  renal panel to be obtained in one week.    Discharge Diet:   She may have a regular diet with preferences  Activity at Discharge:     Follow-up Appointments  Future Appointments   Date Time Provider Department Center   2/19/2019  1:15 PM Hector Brown PA-C [facility] None         Test Results Pending at Discharge   Order Current Status    Tissue Pathology Exam In process           Gregorio [place] MD  02/11/19  8:53 AM

## 2019-02-11 NOTE — NURSING NOTE
"Dr. Valadez contacted by phone and ok for patient to have sling and swathe off when she is sleeping and in \"safe situations\" but suggest that she might wear it when ambulated with physical therapy.  He also wanted to have me dc the OnQ pump prior to trx to [place].  "

## 2019-02-11 NOTE — NURSING NOTE
"Patient has no O2 at home or portable tank.  Dr. Jackson was contacted by phone and enquiry was made about ambulance transport for O2 administration vs private transport without O2.  Dr. Jackson stated that is was \"ok to send her by car.\"  "

## 2019-02-11 NOTE — PLAN OF CARE
Problem: Fall Risk (Adult)  Goal: Absence of Fall  Outcome: Ongoing (interventions implemented as appropriate)   02/11/19 0601   Fall Risk (Adult)   Absence of Fall (BED ALARM ,CLOSE TO NURSES STATION,FREQUENT CHECKS)

## 2019-02-13 LAB
LAB AP CASE REPORT: NORMAL
PATH REPORT.FINAL DX SPEC: NORMAL

## 2019-02-15 ENCOUNTER — LAB REQUISITION (OUTPATIENT)
Dept: LAB | Facility: HOSPITAL | Age: 79
End: 2019-02-15

## 2019-02-15 DIAGNOSIS — Z00.00 ROUTINE GENERAL MEDICAL EXAMINATION AT A HEALTH CARE FACILITY: ICD-10-CM

## 2019-02-15 LAB — CARBAMAZEPINE SERPL-MCNC: 5.6 MCG/ML (ref 4–10)

## 2019-02-15 PROCEDURE — 80156 ASSAY CARBAMAZEPINE TOTAL: CPT

## 2019-02-19 ENCOUNTER — NURSING HOME (OUTPATIENT)
Dept: INTERNAL MEDICINE | Facility: CLINIC | Age: 79
End: 2019-02-19

## 2019-02-19 ENCOUNTER — OFFICE VISIT (OUTPATIENT)
Dept: ORTHOPEDIC SURGERY | Facility: CLINIC | Age: 79
End: 2019-02-19

## 2019-02-19 VITALS — HEIGHT: 63 IN | RESPIRATION RATE: 18 BRPM | BODY MASS INDEX: 21.44 KG/M2 | WEIGHT: 121 LBS

## 2019-02-19 DIAGNOSIS — Z96.612 S/P REVERSE TOTAL SHOULDER ARTHROPLASTY, LEFT: Primary | ICD-10-CM

## 2019-02-19 DIAGNOSIS — S42.292K: Primary | ICD-10-CM

## 2019-02-19 DIAGNOSIS — G35 MS (MULTIPLE SCLEROSIS) (HCC): ICD-10-CM

## 2019-02-19 PROCEDURE — 99024 POSTOP FOLLOW-UP VISIT: CPT | Performed by: PHYSICIAN ASSISTANT

## 2019-02-19 PROCEDURE — 99305 1ST NF CARE MODERATE MDM 35: CPT | Performed by: INTERNAL MEDICINE

## 2019-02-19 NOTE — PROGRESS NOTES
"Subjective   Patient ID: Maria Dolores Campa is a 79 y.o. right hand dominant female is here today for a post-operative visit.  Post-op of the Left Shoulder (Left reverse total shoulder arthroplasty 2/7/19)          CHIEF COMPLAINT:    History of Present Illness      Pain controlled: [] no   [x] yes   Medication refill requested: [x] no   [] yes    Patient compliant with instructions: [] no   [x] yes   Other: Reports good progress since surgery.  Patient states she has minimal pain.   She is wanting to go home as soon as possible from [facility]. Patient denies CP or SOA     Past Medical History:   Diagnosis Date   • Body piercing     EARS   • Constipation    • COPD (chronic obstructive pulmonary disease) (CMS/[facility])    • Cough     NOTED WHILE PATIENT WAS IN PREADMISSION TESTING. PATIENT REPORTS \"CLEAR PHLEM\" WITH NO FEVER AND STATED \"IT'S JUST A SMOKERS COUGH\"   • Full dentures     INSTRUCTED NO ADHESIVES THE DOS   • GERD (gastroesophageal reflux disease)     REPORTS ONLY INTERMITTENT EPISODES   • Hearing loss     PATIENT REPORTS AGE RELATED. NO USE OF HEARING AIDS.    • History of fracture     REPORTS HISTORY OF FRACTURED PELVIS.  REPORTS FALL EARLY JAN 2019 RESULTING IN FRACTURED LEFT SHOULDER.   • History of pneumonia     REPORTS MULTIPLE TIMES   • History of transfusion     REPORTS AS A TEEN AND THAT SHE DID NOT HAVE ANY REACTIONS TO TRANSFUSION   • Hypotension     REPORTS \"MY BLOOD PRESSURE IS ALWAYS LOW - I HAVE NEVER BEEN ABLE TO DONATE BLOOD\"   • Impaired mobility     RELATED TO MULTIPLE SCLEROSIS   • MS (multiple sclerosis) (CMS/[facility])    • Tattoo    • Wears glasses         Past Surgical History:   Procedure Laterality Date   • APPENDECTOMY      REPORTS SHE THINKS SHE HAD APPENDIX REMOVED WHEN GALLBLADDER WAS REMOVED.   • CHOLECYSTECTOMY     • EYE SURGERY      Corneal implant, PATIENT REPORTS SHE IS UNSURE LATERALITY   • MOUTH SURGERY      FULL MOUTH EXTRACTION   • TOTAL SHOULDER ARTHROPLASTY Left " "2/7/2019    Procedure: Left reverse total shoulder arthroplasty;  Surgeon: Eldon Valadez MD;  Location: [facility];  Service: Orthopedics       No Known Allergies    Review of Systems   Constitutional: Negative for fever.   HENT: Negative for voice change.    Eyes: Negative for visual disturbance.   Respiratory: Negative for shortness of breath.    Cardiovascular: Negative for chest pain.   Gastrointestinal: Negative for abdominal distention and abdominal pain.   Genitourinary: Negative for dysuria.   Musculoskeletal: Positive for arthralgias. Negative for gait problem and joint swelling.   Skin: Negative for rash.   Neurological: Negative for speech difficulty.   Hematological: Does not bruise/bleed easily.   Psychiatric/Behavioral: Negative for confusion.       Objective   Resp 18   Ht 160 cm (63\")   Wt 54.9 kg (121 lb)   LMP  (LMP Unknown)   BMI 21.43 kg/m²       Signs of infection: [x] no                    [] yes   Drainage: [x] no                    [] yes   Incision: [x] healing well     []healed well   Motor exam intact: [] no                    [x] yes   Neurovascular exam intact: [] no                    [x] yes   Signs of compartment syndrome: [x] no                    [] yes   Signs of DVT: [x] no                    [] yes   Other:      Physical Exam  Ortho Exam    Extremity DVT signs are Negative by clinical screen.  Neurologic Exam    Shoulder sling is intact    Assessment/Plan   Independent Review of Radiographic Studies:    No new imaging done today.  Laboratory and Other Studies:  No new results reviewed today.   Medical Decision Making:    Stable neurovascular exam.      Procedures     Maria Dolores was seen today for post-op.    Diagnoses and all orders for this visit:    S/P reverse total shoulder arthroplasty, left  -     Ambulatory Referral to Physical Therapy Evaluate and treat (please follow [facility] rehab protocl given to patient on 2-19-19), Ortho         Recommendations/Plan:     Sutures " Staples or Pins [x] Removed today  [] At prior visit  [] Plan removal later   Physical therapy: [x]rehab facility patient was given rehab protocol specific for [facility]  []outpatient referral  [] therapy ongoing   Ultrasound: [x]not ordered         []order given to patient   Labs: [x]not ordered         []order given to patient   Weight Bearing status: []Full []WBAT []PWB [x]NWB []Other            Exercise, medications, injections, other patient advice, and return appointment as noted.    Patient is encouraged to call or return for any issues or concerns.  Please see the progress note handwritten for [facility] for additional instructions    FU in 8 weeks    Patient agreeable to call or return sooner for any concerns.

## 2019-02-28 RX ORDER — HYDROCODONE BITARTRATE AND ACETAMINOPHEN 5; 325 MG/1; MG/1
1 TABLET ORAL EVERY 6 HOURS PRN
Qty: 60 TABLET | Refills: 0 | Status: SHIPPED | OUTPATIENT
Start: 2019-02-28 | End: 2020-02-12

## 2019-05-15 ENCOUNTER — OFFICE VISIT (OUTPATIENT)
Dept: ORTHOPEDIC SURGERY | Facility: CLINIC | Age: 79
End: 2019-05-15

## 2019-05-15 VITALS — HEIGHT: 69 IN | WEIGHT: 100 LBS | RESPIRATION RATE: 18 BRPM | BODY MASS INDEX: 14.81 KG/M2

## 2019-05-15 DIAGNOSIS — M25.512 ACUTE PAIN OF LEFT SHOULDER: Primary | ICD-10-CM
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
## 2019-05-15 DIAGNOSIS — S42.292G OTHER CLOSED DISPLACED FRACTURE OF PROXIMAL END OF LEFT HUMERUS WITH DELAYED HEALING, SUBSEQUENT ENCOUNTER: ICD-10-CM

## 2019-05-15 DIAGNOSIS — Z96.612 S/P REVERSE TOTAL SHOULDER ARTHROPLASTY, LEFT: ICD-10-CM

## 2019-05-15 PROCEDURE — 72040 X-RAY EXAM NECK SPINE 2-3 VW: CPT | Performed by: ORTHOPAEDIC SURGERY

## 2019-05-15 PROCEDURE — 99213 OFFICE O/P EST LOW 20 MIN: CPT | Performed by: ORTHOPAEDIC SURGERY

## 2019-05-15 RX ORDER — ALBUTEROL SULFATE 90 UG/1
AEROSOL, METERED RESPIRATORY (INHALATION)
COMMUNITY

## 2019-05-15 NOTE — PROGRESS NOTES
"Subjective   Patient ID: Maria Dolores Campa is a 79 y.o. right hand dominant female is here today for orthopaedic evaluation of recovery progress with treatment.  Also after she sustained another and a more recent accidental mechanical fall.  Post-op of the Left Shoulder       CHIEF COMPLAINT:    Progress and recovery after surgery.    History of Present Illness     Progress Note:  Patient reports that with treatments and since the last visit, overall pain is slight, strength is unchanged, and range of motion is limited.  Patient is not currently taking medication for shoulderpain.  Physical therapy has been helpful. She is going to [facility] outpatient therapy twice weekly.  Before she fall getting off of the commode, she and her  noted in therapy her shoulder motion had improved to about 90 degrees elevation.  After she fell it is more difficult to raise her arm, now only to about 50 degrees elevation.  Injection therapy has not recently been given..  Patient does  present with recent past labs, imaging or other studies for review today. She went to [facility] after she fell at home on her left shoulder. She was at home getting off commode and fell. She states she did not hit her head, she has some radiating pain and tingling down left arm since fall.     Past Medical History:   Diagnosis Date   • Body piercing     EARS   • Constipation    • COPD (chronic obstructive pulmonary disease) (CMS/[facility])    • Cough     NOTED WHILE PATIENT WAS IN PREADMISSION TESTING. PATIENT REPORTS \"CLEAR PHLEM\" WITH NO FEVER AND STATED \"IT'S JUST A SMOKERS COUGH\"   • Full dentures     INSTRUCTED NO ADHESIVES THE DOS   • GERD (gastroesophageal reflux disease)     REPORTS ONLY INTERMITTENT EPISODES   • Hearing loss     PATIENT REPORTS AGE RELATED. NO USE OF HEARING AIDS.    • History of fracture     REPORTS HISTORY OF FRACTURED PELVIS.  REPORTS FALL EARLY JAN 2019 RESULTING IN FRACTURED LEFT SHOULDER.   • History of " "pneumonia     REPORTS MULTIPLE TIMES   • History of transfusion     REPORTS AS A TEEN AND THAT SHE DID NOT HAVE ANY REACTIONS TO TRANSFUSION   • Hypotension     REPORTS \"MY BLOOD PRESSURE IS ALWAYS LOW - I HAVE NEVER BEEN ABLE TO DONATE BLOOD\"   • Impaired mobility     RELATED TO MULTIPLE SCLEROSIS   • MS (multiple sclerosis) (CMS/[facility])    • Tattoo    • Wears glasses         Past Surgical History:   Procedure Laterality Date   • APPENDECTOMY      REPORTS SHE THINKS SHE HAD APPENDIX REMOVED WHEN GALLBLADDER WAS REMOVED.   • CHOLECYSTECTOMY     • EYE SURGERY      Corneal implant, PATIENT REPORTS SHE IS UNSURE LATERALITY   • MOUTH SURGERY      FULL MOUTH EXTRACTION   • TOTAL SHOULDER ARTHROPLASTY Left 2/7/2019    Procedure: Left reverse total shoulder arthroplasty;  Surgeon: Eldon Valadez MD;  Location: [facility];  Service: Orthopedics        Family History   Family history unknown: Yes        Social History     Socioeconomic History   • Marital status:      Spouse name: Not on file   • Number of children: Not on file   • Years of education: Not on file   • Highest education level: Not on file   Tobacco Use   • Smoking status: Current Every Day Smoker     Packs/day: 0.50     Years: 58.00     Pack years: 29.00     Types: Cigarettes   • Smokeless tobacco: Never Used   • Tobacco comment: REPORTS SHE HAS SMOKED UP TO 1 PPD IN THE PAST   Substance and Sexual Activity   • Alcohol use: No   • Drug use: No   • Sexual activity: Defer   Social History Narrative    Right hand dominant       No Known Allergies    Review of Systems   Constitutional: Negative for fever.   HENT: Negative for voice change.    Eyes: Negative for visual disturbance.   Respiratory: Negative for shortness of breath.    Cardiovascular: Negative for chest pain.   Gastrointestinal: Negative for abdominal distention and abdominal pain.   Genitourinary: Negative for dysuria.   Musculoskeletal: Positive for arthralgias and gait problem ( uses walker " "at all times for stability). Negative for joint swelling.   Skin: Negative for rash.   Neurological: Negative for speech difficulty.   Hematological: Does not bruise/bleed easily.   Psychiatric/Behavioral: Negative for confusion.         Objective   Resp 18   Ht 175.3 cm (69\")   Wt 45.4 kg (100 lb)   LMP  (LMP Unknown)   BMI 14.77 kg/m²     Physical Exam   Constitutional: She appears well-developed. No distress.   Neurological: She is alert.   Skin: Skin is warm and dry. No rash noted. No erythema.   Psychiatric: She has a normal mood and affect. Her speech is normal.   Vitals reviewed.    Back Exam     Tenderness   The patient is experiencing tenderness in the cervical.      Left Shoulder Exam     Tenderness   Left shoulder tenderness location: little or no shoulder pain today.    Range of Motion   Active abduction: 40   External rotation: 50   Forward flexion: 50   Internal rotation 0 degrees: T12     Muscle Strength   Abduction: 3/5   Internal rotation: 4/5   Supraspinatus: 3/5   Biceps: 4/5     Tests   Apprehension: negative  Montilla test: positive  Impingement: positive  Drop arm: negative    Other   Erythema: absent  Scars: present (well healed)  Sensation: decreased (nondermatomal, diffuse, occasional of left arm and hand)  Pulse: present           Neurologic Exam     Mental Status   Attention: normal.   Speech: speech is normal   Level of consciousness: alert  Knowledge: good.     Motor Exam   Overall muscle tone: normal      Assessment/Plan    Independent Review of Radiographic Studies:    Reviewed images and agree with radiologist interpretation.  AP and lateral cervical spine radiographs obtained in office today, indication to evaluate pain, no comparison views, shows no acute fracture or dislocation evident.  There is moderate C4-5 and severe C5-6 and C6-7 DDD with facet arthropathy and sclerosis as well.  No prevertebral soft tissue swelling and no evident acute process.  Slight C3 anterior " translation from C4 thought he posterior vertebral body line remains smooth.  No evident facet subluxation or dislocation.  Likely relates to the mild cervical lordosis of the upper cervical spine that transitions to abnormal loss of lordosis and mild kyphosis of the degenerative mid and lower cervical spine levels.   Laboratory and Other Studies:  No new results reviewed today.   Medical Decision Making:    Stable neurovascular exam.  Fair progress though ongoing with residual symptoms.  Continue current management and any additional treatments and workup as outlined in plan.  Sequelae of setback with therapy since her mechanical fall.  Will try to resume PT/OT next week to help recover shoulder mobility and gradual strenghtening.  Given that she fell, and has some neck tenderness (not midline though along the cervical and trapezius muscles, we obtained C-spine AP and lateral views that show no definitive acute fracture.  There is normal pre-vertebral soft tissue shadow with no evident swelling or hematoma.  We discussed additional options for shoulder pain and a CT scan of the left shoudler can assess for stress fracture, rotator cuff tear or other condition.    Procedures    Maria Dolores was seen today for post-op.    Diagnoses and all orders for this visit:    Acute pain of left shoulder  -     XR Spine Cervical 2 or 3 View  -     CT shoulder left wo contrast    Fall, initial encounter  -     XR Spine Cervical 2 or 3 View  -     CT shoulder left wo contrast    S/P reverse total shoulder arthroplasty, left  -     CT shoulder left wo contrast    Other closed displaced fracture of proximal end of left humerus with delayed healing, subsequent encounter       Discussion of orthopaedic goals and activities and patient and/or guardian expressed appreciation.  Risk, benefits, and merits of treatment alternatives reviewed with the patient and/or guardian and questions answered  Regular exercise as tolerated  Guided on proper  techniques for mobility, strength, agility and/or conditioning exercises  Ice, heat, and/or modalities as beneficial  Weight bearing parameters reviewed  Physical therapy program ongoing  After care and dental prophylaxis for joint replacement prosthesis    Recommendations/Plan:  Exercise, medications, injections, other patient advice, and return appointment as noted.  Brace: No brace was given at today's visit  Referral: Physical and/or Occupational Therapy referral  Test/Studies: CT scan Left shoulder  Surgery: No surgery proposed at this visit  Work/Activity Status: May perform usual activities as tolerated and No strenuous activity.  Avoid outpatient therapy until CT results reviewed. Patient to continue gentle home exercises and if CT is stable plan resuming formal PT/OT.    Return in about 3 weeks (around 6/5/2019) for Review studies, repeat exam, update plan.  Patient is encouraged and agreeable to call or return sooner for any issues or concerns.          Portions of this note have been scribed for Eldon Valadez MD by Clau Mujica CMA.. 5/27/2019  1:36 PM

## 2019-06-03 ENCOUNTER — TELEPHONE (OUTPATIENT)
Dept: ORTHOPEDIC SURGERY | Facility: CLINIC | Age: 79
End: 2019-06-03

## 2019-06-03 NOTE — PROGRESS NOTES
"  Nursing Home History and Physical        Wily Quan, DO ?  Kierra Thomson, APRN ?  [address] [place] [address]  Phone: [phone]  Fax: [phone] David Christianson MD[x]  Antonio Montana, DO ?   ADALG[facility] Mariscal ?    [address] [place] [address]  Phone: [phone]  Fax: [phone]     PATIENT NAME: Maria Dolores Campa                                                                          YOB: 1940           DATE OF SERVICE: 02/19/2019  FACILITY:   [x] [facility]  [] [facility]    [] [facility]      ______________________________________________________________________    CHIEF COMPLAINT:  Visit, posthospitalization for left humerus fractures      HISTORY OF PRESENT ILLNESS:   This Lokesh 78 years old female with multiple sclerosis and secondary lens impairment and progressive functional decline who was transferred from [facility] after being hospitalized with a diagnosis of closed 4 part fracture of proximal left humerus as well as acute urinary tract infection and acute exacerbation of COPD.  Patient was transferred to this facility in stable condition with plans to proceed with PT, OT and skilled nursing care.  During my visit, patient appeared comfortable, denied current acute problems.      PAST MEDICAL & SURGICAL HISTORY:   Past Medical History:   Diagnosis Date   • Body piercing     EARS   • Constipation    • COPD (chronic obstructive pulmonary disease) (CMS/[facility])    • Cough     NOTED WHILE PATIENT WAS IN PREADMISSION TESTING. PATIENT REPORTS \"CLEAR PHLEM\" WITH NO FEVER AND STATED \"IT'S JUST A SMOKERS COUGH\"   • Full dentures     INSTRUCTED NO ADHESIVES THE DOS   • GERD (gastroesophageal reflux disease)     REPORTS ONLY INTERMITTENT EPISODES   • Hearing loss     PATIENT REPORTS AGE RELATED. NO USE OF HEARING AIDS.    • History of fracture     REPORTS HISTORY OF FRACTURED PELVIS.  REPORTS FALL EARLY JAN 2019 RESULTING " "IN FRACTURED LEFT SHOULDER.   • History of pneumonia     REPORTS MULTIPLE TIMES   • History of transfusion     REPORTS AS A TEEN AND THAT SHE DID NOT HAVE ANY REACTIONS TO TRANSFUSION   • Hypotension     REPORTS \"MY BLOOD PRESSURE IS ALWAYS LOW - I HAVE NEVER BEEN ABLE TO DONATE BLOOD\"   • Impaired mobility     RELATED TO MULTIPLE SCLEROSIS   • MS (multiple sclerosis) (CMS/[facility])    • Tattoo    • Wears glasses       Past Surgical History:   Procedure Laterality Date   • APPENDECTOMY      REPORTS SHE THINKS SHE HAD APPENDIX REMOVED WHEN GALLBLADDER WAS REMOVED.   • CHOLECYSTECTOMY     • EYE SURGERY      Corneal implant, PATIENT REPORTS SHE IS UNSURE LATERALITY   • MOUTH SURGERY      FULL MOUTH EXTRACTION   • TOTAL SHOULDER ARTHROPLASTY Left 2/7/2019    Procedure: Left reverse total shoulder arthroplasty;  Surgeon: Eldon Valadez MD;  Location: [facility];  Service: Orthopedics         MEDICATIONS:  I have reviewed and reconciled the patients medication list in the patients chart at the skilled nursing facility today.      ALLERGIES:  No Known Allergies      SOCIAL HISTORY:  Social History     Socioeconomic History   • Marital status:      Spouse name: Not on file   • Number of children: Not on file   • Years of education: Not on file   • Highest education level: Not on file   Tobacco Use   • Smoking status: Current Every Day Smoker     Packs/day: 0.50     Years: 58.00     Pack years: 29.00     Types: Cigarettes   • Smokeless tobacco: Never Used   • Tobacco comment: REPORTS SHE HAS SMOKED UP TO 1 PPD IN THE PAST   Substance and Sexual Activity   • Alcohol use: No   • Drug use: No   • Sexual activity: Defer   Social History Narrative    Right hand dominant       FAMILY HISTORY:  Family History   Family history unknown: Yes       REVIEW OF SYSTEMS:  Review of Systems   Constitutional: Positive for fatigue.   HENT: Negative.    Eyes: Negative.    Respiratory: Negative.    Cardiovascular: Negative.  "   Gastrointestinal: Negative.    Genitourinary: Negative.    Musculoskeletal: Positive for arthralgias and myalgias.   Neurological: Negative.    Psychiatric/Behavioral: Negative.        PHYSICAL EXAMINATION:   VITAL SIGNS: /70, HR 74/min, RR 18/min, temp 97.8 °F    Physical Exam   Constitutional: She is oriented to person, place, and time. She appears well-developed and well-nourished.   HENT:   Head: Normocephalic and atraumatic.   Eyes: EOM are normal. Pupils are equal, round, and reactive to light.   Neck: Normal range of motion. Neck supple.   Cardiovascular: Normal rate, regular rhythm and normal heart sounds.   Pulmonary/Chest: Effort normal and breath sounds normal.   Abdominal: Soft. Bowel sounds are normal.   Musculoskeletal:   Conditioning, limited mobility   Neurological: She is alert and oriented to person, place, and time.   Skin: Skin is warm and dry.   Psychiatric: She has a normal mood and affect.       RECORDS REVIEW:   I have reviewed in detail available records including discharge summary and workup    ASSESSMENT:  · Left humerus four-part fracture secondary to mechanical fall  · Acute urinary tract infection  · Status post acute exacerbation of chronic obstructive pulmonary disease  · Moderate malnutrition  · Status post acute metabolic encephalopathy  · Multiple sclerosis    PLAN:  · Preadmission medications reviewed, reconciled and renewed  · Patient was encouraged to proceed with PT and OT  · Fall and aspiration precautions  · Nutritional support addressed by dietitian and interdisciplinary team  · Patient was counseled regarding her condition management plan  · Patient will be monitored closely, further plans were modified accordingly    [x]  Discussed Patient in detail with nursing/staff, addressed all needs today.     [x]  Plan of Care Reviewed   [x]  PT/OT Reviewed   [x]  Order Changes  []  Discharge Plans Reviewed  []  Advance Directive on file with Nursing Home.   []  POA on file  with Nursing Home.   [x]  Code Status listed on patients chart at the nursing home facility.          David Christianson MD.  2/19/2019

## 2019-12-10 ENCOUNTER — OFFICE VISIT (OUTPATIENT)
Dept: NEUROLOGY | Facility: CLINIC | Age: 79
End: 2019-12-10

## 2019-12-10 VITALS
WEIGHT: 101.8 LBS | HEIGHT: 69 IN | BODY MASS INDEX: 15.08 KG/M2 | DIASTOLIC BLOOD PRESSURE: 78 MMHG | SYSTOLIC BLOOD PRESSURE: 112 MMHG

## 2019-12-10 DIAGNOSIS — G35 MS (MULTIPLE SCLEROSIS) (HCC): Primary | ICD-10-CM

## 2019-12-10 DIAGNOSIS — G50.0 TRIGEMINAL NEURALGIA OF LEFT SIDE OF FACE: ICD-10-CM

## 2019-12-10 PROBLEM — S04.32XA INJURY OF LEFT TRIGEMINAL NERVE: Status: ACTIVE | Noted: 2019-12-10

## 2019-12-10 PROCEDURE — 99204 OFFICE O/P NEW MOD 45 MIN: CPT | Performed by: PSYCHIATRY & NEUROLOGY

## 2019-12-10 NOTE — PROGRESS NOTES
"Subjective   Patient ID: Maria Dolores Campa is a 79 y.o. female     Chief Complaint   Patient presents with   • Pain     Trigeminal Neuralgia        History of Present Illness    79 y.o. female self referred for RRMS and Trigeminal neuralgia.  Last seen by myself over 5 years.  Dx with MS 30 years old.      Treated with Betaseron briefly for 5 - 6 months.      Developed facial pain a year ago.  Dr Jackson started on Epitol for facial pain.  Located in L V2/3 distribution.  Intermittently present.  Bouts of pain occurring for 24 hours every few weeks.  Taking Epitol 200 mg TID.  Chewing on left side of mouth provokes sx.        Past Medical History:   Diagnosis Date   • Body piercing     EARS   • Constipation    • COPD (chronic obstructive pulmonary disease) (CMS/[facility])    • Cough     NOTED WHILE PATIENT WAS IN PREADMISSION TESTING. PATIENT REPORTS \"CLEAR PHLEM\" WITH NO FEVER AND STATED \"IT'S JUST A SMOKERS COUGH\"   • Full dentures     INSTRUCTED NO ADHESIVES THE DOS   • GERD (gastroesophageal reflux disease)     REPORTS ONLY INTERMITTENT EPISODES   • Hearing loss     PATIENT REPORTS AGE RELATED. NO USE OF HEARING AIDS.    • History of fracture     REPORTS HISTORY OF FRACTURED PELVIS.  REPORTS FALL EARLY JAN 2019 RESULTING IN FRACTURED LEFT SHOULDER.   • History of pneumonia     REPORTS MULTIPLE TIMES   • History of transfusion     REPORTS AS A TEEN AND THAT SHE DID NOT HAVE ANY REACTIONS TO TRANSFUSION   • Hypotension     REPORTS \"MY BLOOD PRESSURE IS ALWAYS LOW - I HAVE NEVER BEEN ABLE TO DONATE BLOOD\"   • Impaired mobility     RELATED TO MULTIPLE SCLEROSIS   • MS (multiple sclerosis) (CMS/[facility])    • Tattoo    • Wears glasses      Family History   Family history unknown: Yes     Social History     Socioeconomic History   • Marital status:      Spouse name: Not on file   • Number of children: Not on file   • Years of education: Not on file   • Highest education level: Not on file   Tobacco Use   • Smoking " "status: Current Every Day Smoker     Packs/day: 0.50     Years: 58.00     Pack years: 29.00     Types: Cigarettes   • Smokeless tobacco: Never Used   • Tobacco comment: REPORTS SHE HAS SMOKED UP TO 1 PPD IN THE PAST   Substance and Sexual Activity   • Alcohol use: No   • Drug use: No   • Sexual activity: Defer   Social History Narrative    Right hand dominant       Review of Systems   Constitutional: Positive for fatigue. Negative for activity change and unexpected weight change.   HENT: Negative for tinnitus and trouble swallowing.    Eyes: Negative for photophobia and visual disturbance.   Respiratory: Negative for apnea, cough and choking.    Cardiovascular: Negative for leg swelling.   Gastrointestinal: Negative for nausea and vomiting.   Endocrine: Negative for cold intolerance and heat intolerance.   Genitourinary: Negative for difficulty urinating, frequency, menstrual problem and urgency.   Musculoskeletal: Positive for gait problem. Negative for back pain, myalgias and neck pain.   Skin: Negative for color change and rash.   Allergic/Immunologic: Negative for immunocompromised state.   Neurological: Positive for dizziness, weakness, numbness and headaches. Negative for tremors, seizures, syncope, facial asymmetry, speech difficulty and light-headedness.   Hematological: Negative for adenopathy. Does not bruise/bleed easily.   Psychiatric/Behavioral: Positive for decreased concentration. Negative for behavioral problems, confusion, hallucinations and sleep disturbance.       Objective     Vitals:    12/10/19 1506   BP: 112/78   Weight: 46.2 kg (101 lb 12.8 oz)   Height: 175.3 cm (69\")       Neurologic Exam     Mental Status   Oriented to person, place, and time.   Registration: recalls 2 of 3 objects. Recall at 5 minutes: recalls 2 of 3 objects. Follows 3 step commands.   Attention: decreased. Concentration: decreased.   Speech: speech is normal   Level of consciousness: alert  Knowledge: poor and " inconsistent with education.   Able to name object. Able to read. Able to repeat. Able to write. Abnormal comprehension.     Cranial Nerves     CN II   Visual fields full to confrontation.   Visual acuity: normal  Right visual field deficit: none  Left visual field deficit: none     CN III, IV, VI   Pupils are equal, round, and reactive to light.  Extraocular motions are normal.   Right pupil: Shape: regular. Reactivity: brisk. Consensual response: intact.   Left pupil: Shape: regular. Reactivity: brisk. Consensual response: intact.   Nystagmus: none   Diplopia: none  Ophthalmoparesis: none  Upgaze: normal  Downgaze: normal  Conjugate gaze: present  Vestibulo-ocular reflex: present    CN V   Facial sensation intact.   Right corneal reflex: normal  Left corneal reflex: normal    CN VII   Right facial weakness: none  Left facial weakness: none    CN VIII   Hearing: intact    CN IX, X   Palate: symmetric  Right gag reflex: normal  Left gag reflex: normal    CN XI   Right sternocleidomastoid strength: normal  Left sternocleidomastoid strength: normal    CN XII   Tongue: not atrophic  Fasciculations: absent  Tongue deviation: none  dyscongugate eye movements     Motor Exam   Muscle bulk: normal  Overall muscle tone: normal  Right arm tone: normal  Left arm tone: normal  Right leg tone: normal  Left leg tone: normal    Strength   Strength 5/5 throughout.     Sensory Exam   Light touch normal.   Vibration normal.   Proprioception normal.   Pinprick normal.     Gait, Coordination, and Reflexes     Gait  Gait: spastic and wide-based    Coordination   Romberg: negative  Finger to nose coordination: normal  Heel to shin coordination: normal  Tandem walking coordination: normal    Tremor   Resting tremor: absent  Intention tremor: present  Action tremor: left arm, right arm, left leg and right leg    Reflexes   Reflexes 2+ except as noted.       Physical Exam   Constitutional: She is oriented to person, place, and time.   Eyes:  Pupils are equal, round, and reactive to light. EOM are normal.   Neurological: She is oriented to person, place, and time. She has normal strength. She has a normal Finger-Nose-Finger Test, a normal Heel to Shin Test, a normal Romberg Test and a normal Tandem Gait Test.   Psychiatric: Her speech is normal.       Lab Requisition on 02/15/2019   Component Date Value Ref Range Status   • Carbamazepine Level 02/15/2019 5.6  4.0 - 10.0 mcg/mL Final         Assessment/Plan     Problem List Items Addressed This Visit        Nervous and Auditory    MS (multiple sclerosis) (CMS/HCC) - Primary    Current Assessment & Plan     Spastic quadriparesis    Using walker for balance.               Relevant Orders    MRI Brain With & Without Contrast    Trigeminal neuralgia of left side of face    Current Assessment & Plan     MRI Brain    Continue  mg TID     Refer to Dr Fournier for possible gamma knife procedure                   No follow-ups on file.

## 2019-12-26 ENCOUNTER — HOSPITAL ENCOUNTER (OUTPATIENT)
Dept: MRI IMAGING | Facility: HOSPITAL | Age: 79
Discharge: HOME | End: 2019-12-26
Admitting: PSYCHIATRY & NEUROLOGY

## 2019-12-26 DIAGNOSIS — G35 MS (MULTIPLE SCLEROSIS) (HCC): ICD-10-CM

## 2019-12-26 PROCEDURE — A9577 INJ MULTIHANCE: HCPCS | Performed by: PSYCHIATRY & NEUROLOGY

## 2019-12-26 PROCEDURE — 0 GADOBENATE DIMEGLUMINE 529 MG/ML SOLUTION: Performed by: PSYCHIATRY & NEUROLOGY

## 2019-12-26 PROCEDURE — 70553 MRI BRAIN STEM W/O & W/DYE: CPT

## 2019-12-26 RX ADMIN — GADOBENATE DIMEGLUMINE 9 ML: 529 INJECTION, SOLUTION INTRAVENOUS at 16:23

## 2020-01-07 ENCOUNTER — OFFICE VISIT (OUTPATIENT)
Dept: NEUROSURGERY | Facility: CLINIC | Age: 80
End: 2020-01-07

## 2020-01-07 VITALS — TEMPERATURE: 98.6 F | BODY MASS INDEX: 15.85 KG/M2 | HEIGHT: 69 IN | WEIGHT: 107 LBS

## 2020-01-07 DIAGNOSIS — G50.0 TRIGEMINAL NEURALGIA: Primary | ICD-10-CM

## 2020-01-07 PROCEDURE — 99203 OFFICE O/P NEW LOW 30 MIN: CPT | Performed by: NEUROLOGICAL SURGERY

## 2020-01-07 NOTE — PROGRESS NOTES
Patient: Maria Dolores Campa  : 1940    Primary Care Provider: Gregorio Jackson MD    Requesting Provider: As above        History    Chief Complaint: Left-sided facial pain.    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  It has been back over the last year or so.  It tends to come and go.  Heat and Tegretol are helpful.  She is worse with brushing her teeth or chewing.  Since she has gone to 3 times a day on her medicine she has had very few attacks and the spells that she has had have been modest.  When present the pain is said to be sharp.  The pain is always in the maxillary region on the left.  She has no right-sided pain.  The patient does have a history of multiple sclerosis.    Review of Systems   Constitutional: Negative for activity change, appetite change, chills, diaphoresis, fatigue, fever and unexpected weight change.   HENT: Negative for congestion, dental problem, drooling, ear discharge, ear pain, facial swelling, hearing loss, mouth sores, nosebleeds, postnasal drip, rhinorrhea, sinus pressure, sneezing, sore throat, tinnitus, trouble swallowing and voice change.    Eyes: Negative for photophobia, pain, discharge, redness, itching and visual disturbance.   Respiratory: Negative for apnea, cough, choking, chest tightness, shortness of breath, wheezing and stridor.    Cardiovascular: Negative for chest pain, palpitations and leg swelling.   Gastrointestinal: Negative for abdominal distention, abdominal pain, anal bleeding, blood in stool, constipation, diarrhea, nausea, rectal pain and vomiting.   Endocrine: Negative for cold intolerance, heat intolerance, polydipsia, polyphagia and polyuria.   Genitourinary: Negative for decreased urine volume, difficulty urinating, dysuria, enuresis, flank pain, frequency, genital sores, hematuria and urgency.   Musculoskeletal: Negative for  "arthralgias, back pain, gait problem, joint swelling, myalgias, neck pain and neck stiffness.   Skin: Negative for color change, pallor, rash and wound.   Allergic/Immunologic: Negative for environmental allergies, food allergies and immunocompromised state.   Neurological: Negative for dizziness, tremors, seizures, syncope, facial asymmetry, speech difficulty, weakness, light-headedness, numbness and headaches.   Hematological: Negative for adenopathy. Does not bruise/bleed easily.   Psychiatric/Behavioral: Negative for agitation, behavioral problems, confusion, decreased concentration, dysphoric mood, hallucinations, self-injury, sleep disturbance and suicidal ideas. The patient is not nervous/anxious and is not hyperactive.        The patient's past medical history, past surgical history, family history, and social history have been reviewed at length in the electronic medical record.    Physical Exam:   Temp 98.6 °F (37 °C) (Temporal)   Ht 175.3 cm (69\")   Wt 48.5 kg (107 lb)   LMP  (LMP Unknown)   BMI 15.80 kg/m²   CONSTITUTIONAL: Patient is well-nourished, pleasant and appears stated age.  CV: Heart regular rate and rhythm without murmur, rub, or gallop.  PULMONARY: Lungs are clear to ascultation.  MUSCULOSKELETAL:  Neck tenderness to palpation is not observed.   ROM in neck is normal.  NEUROLOGICAL:  Orientation, memory, attention span, language function, and cognition have been examined and are intact.  Strength is intact in the upper and lower extremities to direct testing.  Muscle tone is normal throughout.  Station and gait are normal.  Sensation is intact to light touch testing throughout.  Deep tendon reflexes are 1+ and symmetrical.  Shayne's Sign is negative bilaterally. No clonus is elicited.  Coordination is intact.  CRANIAL NERVES:  Cranial Nerve II: Visual fields are full to confrontation.  Cranial Nerve III, IV, and VI: PERRLADC. Extraocular movements are intact.  Nystagmus is not " present.  Cranial Nerve V: Facial sensation is intact to light touch.  Cranial Nerve VII: Muscles of facial expression demonstate no weakness or asymmetry.  Cranial Nerve VIII: Hearing is intact to finger rub bilaterally.  Cranial Nerve IX and X: Palate elevates symmetrically.  Cranial Nerve XI: Shoulder shrug is intact bilaterally.  Cranial Nerve XII: Tongue is midline without evidence of atrophy or fasciculation.    Medical Decision Making    Data Review:   MRI of the brain does not demonstrate an overt abnormality in the CP angle or near the trigeminal nerve on the left.    Diagnosis:   Left-sided trigeminal neuralgia in a patient with multiple sclerosis.    Treatment Options:   The patient is holding her own quite nicely on her 3 times a day medication regimen.  I would continue in this regard.  If symptoms progress or if medicines become less well-tolerated then we might consider CyberKnife radiosurgery.  Patients with multiple sclerosis do not fare as well with treatment for their trigeminal neuralgia.       Diagnosis Plan   1. Trigeminal neuralgia           Scribed for Johny Fournier MD by Sunni James CMA on 01/07/2020 at 4:35 PM    I, Dr. Fournier, personally performed the services described in the documentation, as scribed in my presence, and it is both accurate and complete.

## 2020-01-24 ENCOUNTER — TELEPHONE (OUTPATIENT)
Dept: NEUROSURGERY | Facility: CLINIC | Age: 80
End: 2020-01-24

## 2020-01-24 NOTE — TELEPHONE ENCOUNTER
Please schedule them to see Dr. Fournier in the next week or so to discuss options going forward. .

## 2020-01-24 NOTE — TELEPHONE ENCOUNTER
Provider:Shantanu     Caller: pt's spouse  Time of call:     Phone #:[phone]    Surgery: N/A   Surgery Date: N/A    Last visit:01/07/20  Next visit: not scheduled     BALWINDER:         Reason for call: please see Luba's note.

## 2020-01-24 NOTE — TELEPHONE ENCOUNTER
Pts  called today to let us know that she is having more pain from her Trigeminal and they would like to schedule another appointment in the office.  Please advise.

## 2020-01-27 NOTE — TELEPHONE ENCOUNTER
Return call received from pts .  Tried to call back to schedule and had to leave another message.  Asked for call back with preference on day/time.

## 2020-02-04 ENCOUNTER — OFFICE VISIT (OUTPATIENT)
Dept: NEUROSURGERY | Facility: CLINIC | Age: 80
End: 2020-02-04

## 2020-02-04 VITALS — HEIGHT: 69 IN | TEMPERATURE: 97.8 F | WEIGHT: 108 LBS | BODY MASS INDEX: 16 KG/M2

## 2020-02-04 DIAGNOSIS — G50.0 TRIGEMINAL NEURALGIA: Primary | ICD-10-CM

## 2020-02-04 PROCEDURE — 99213 OFFICE O/P EST LOW 20 MIN: CPT | Performed by: NEUROLOGICAL SURGERY

## 2020-02-04 NOTE — PROGRESS NOTES
Patient: Maria Dolores Campa  : 1940    Primary Care Provider: Gregorio Jackson MD    Requesting Provider: As above        History    Chief Complaint: Left-sided facial pain.    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  It has been back over the last year or so.  It tends to come and go.  Heat and Tegretol are helpful.  She is worse with brushing her teeth or chewing.  When seen last she had increased her Tegretol to 3 times a day and was doing better.  The patient does have a history of multiple sclerosis.  At this point her symptoms seem to be less tolerable.  She is having pain every day or 2.  These of the severe spells.  On a regular or fairly constant basis she has a lower grade left V2 distribution pain.  She never has right-sided pain.    Review of Systems   Constitutional: Negative for activity change, appetite change, chills, diaphoresis, fatigue, fever and unexpected weight change.   HENT: Negative for congestion, dental problem, drooling, ear discharge, ear pain, facial swelling, hearing loss, mouth sores, nosebleeds, postnasal drip, rhinorrhea, sinus pressure, sneezing, sore throat, tinnitus, trouble swallowing and voice change.    Eyes: Negative for photophobia, pain, discharge, redness, itching and visual disturbance.   Respiratory: Negative for apnea, cough, choking, chest tightness, shortness of breath, wheezing and stridor.    Cardiovascular: Negative for chest pain, palpitations and leg swelling.   Gastrointestinal: Negative for abdominal distention, abdominal pain, anal bleeding, blood in stool, constipation, diarrhea, nausea, rectal pain and vomiting.   Endocrine: Negative for cold intolerance, heat intolerance, polydipsia, polyphagia and polyuria.   Genitourinary: Negative for decreased urine volume, difficulty urinating, dysuria, enuresis, flank pain, frequency, genital  "sores, hematuria and urgency.   Musculoskeletal: Negative for arthralgias, back pain, gait problem, joint swelling, myalgias, neck pain and neck stiffness.   Skin: Negative for color change, pallor, rash and wound.   Allergic/Immunologic: Negative for environmental allergies, food allergies and immunocompromised state.   Neurological: Negative for dizziness, tremors, seizures, syncope, facial asymmetry, speech difficulty, weakness, light-headedness, numbness and headaches.   Hematological: Negative for adenopathy. Does not bruise/bleed easily.   Psychiatric/Behavioral: Negative for agitation, behavioral problems, confusion, decreased concentration, dysphoric mood, hallucinations, self-injury, sleep disturbance and suicidal ideas. The patient is not nervous/anxious and is not hyperactive.        The patient's past medical history, past surgical history, family history, and social history have been reviewed at length in the electronic medical record.    Physical Exam:   Temp 97.8 °F (36.6 °C) (Temporal)   Ht 175.3 cm (69\")   Wt 49 kg (108 lb)   LMP  (LMP Unknown)   BMI 15.95 kg/m²   NEUROLOGICAL:  Strength is intact in the upper and lower extremities to direct testing.  Muscle tone is normal throughout.  Station and gait are normal.  Sensation is intact to light touch testing throughout.  CRANIAL NERVES:  Cranial Nerve II: Visual fields are full to confrontation.  Cranial Nerve III, IV, and VI: PERRLADC. Extraocular movements are intact.  Nystagmus is not present.  Cranial Nerve V: Facial sensation is intact to light touch.  Cranial Nerve VII: Muscles of facial expression demonstate no weakness or asymmetry.  Cranial Nerve VIII: Hearing is intact to finger rub bilaterally.  Cranial Nerve IX and X: Palate elevates symmetrically.  Cranial Nerve XI: Shoulder shrug is intact bilaterally.  Cranial Nerve XII: Tongue is midline without evidence of atrophy or fasciculation.    Medical Decision Making    Data Review:   MRI " of the brain does not demonstrate an overt abnormality in the CP angle or near the trigeminal nerve on the left.       Diagnosis:   1.  Left trigeminal neuralgia.  2.  Multiple sclerosis.    Treatment Options:   Given her ongoing and at times debilitating symptoms I have recommended CyberKnife radiosurgery.  I have briefly touched on the nature of this intervention as well as the potential risks, complications, and limitations.       Diagnosis Plan   1. Trigeminal neuralgia  Ambulatory Referral to Radiation Oncology       Scribed for Johny Fournier MD by Sunni James CMA on 02/04/2020 at 2:08 PM      I, Dr. Fournier, personally performed the services described in the documentation, as scribed in my presence, and it is both accurate and complete.

## 2020-02-12 ENCOUNTER — OFFICE VISIT (OUTPATIENT)
Dept: RADIATION ONCOLOGY | Facility: HOSPITAL | Age: 80
End: 2020-02-12

## 2020-02-12 ENCOUNTER — HOSPITAL ENCOUNTER (OUTPATIENT)
Dept: RADIATION ONCOLOGY | Facility: HOSPITAL | Age: 80
Setting detail: RADIATION/ONCOLOGY SERIES
Discharge: HOME | End: 2020-02-12

## 2020-02-12 VITALS
BODY MASS INDEX: 16.96 KG/M2 | WEIGHT: 111.9 LBS | SYSTOLIC BLOOD PRESSURE: 130 MMHG | RESPIRATION RATE: 16 BRPM | HEART RATE: 74 BPM | TEMPERATURE: 97.8 F | HEIGHT: 68 IN | DIASTOLIC BLOOD PRESSURE: 64 MMHG | OXYGEN SATURATION: 89 %

## 2020-02-12 DIAGNOSIS — G50.0 TRIGEMINAL NEURALGIA OF LEFT SIDE OF FACE: Primary | ICD-10-CM

## 2020-02-12 PROCEDURE — G0463 HOSPITAL OUTPT CLINIC VISIT: HCPCS

## 2020-02-12 NOTE — PROGRESS NOTES
"CONSULTATION NOTE    NAME:      Maria Dolores Campa  :                                                          1940  DATE OF CONSULTATION:                       20  REQUESTING PHYSICIAN:                   Johny Fournier MD  REASON FOR CONSULTATION:           Left trigeminal neuralgia       BRIEF HISTORY:  Maria Dolores Campa  is a very pleasant 79 y.o. female  he developed left facial pain approximately 10 years ago.  It resolved but in the last year has returned.  The pain is decreased with Tegretol and heat.  She said it is worse with brushing her teeth and chewing.  She describes the pain as sharp in the left maxillary region.  The right side is negative.  Since she increased Tegretol the pain has decreased and the attacks are less frequent.  She saw Dr. Nilo Colbert who is referred her for consideration of CyberKnife stereotactic radiosurgery to the left trigeminal nerve.  Of note she has a history of multiple sclerosis.       No Known Allergies    Social History     Tobacco Use   • Smoking status: Current Every Day Smoker     Packs/day: 0.50     Years: 58.00     Pack years: 29.00     Types: Cigarettes   • Smokeless tobacco: Never Used   • Tobacco comment: REPORTS SHE HAS SMOKED UP TO 1 PPD IN THE PAST   Substance Use Topics   • Alcohol use: No   • Drug use: No         Past Medical History:   Diagnosis Date   • Body piercing     EARS   • Constipation    • COPD (chronic obstructive pulmonary disease) (CMS/[facility])    • Cough     NOTED WHILE PATIENT WAS IN PREADMISSION TESTING. PATIENT REPORTS \"CLEAR PHLEM\" WITH NO FEVER AND STATED \"IT'S JUST A SMOKERS COUGH\"   • Full dentures     INSTRUCTED NO ADHESIVES THE DOS   • GERD (gastroesophageal reflux disease)     REPORTS ONLY INTERMITTENT EPISODES   • Hearing loss     PATIENT REPORTS AGE RELATED. NO USE OF HEARING AIDS.    • History of fracture     REPORTS HISTORY OF FRACTURED PELVIS.  REPORTS FALL EARLY 2019 RESULTING IN FRACTURED LEFT SHOULDER. " "  • History of pneumonia     REPORTS MULTIPLE TIMES   • History of transfusion     REPORTS AS A TEEN AND THAT SHE DID NOT HAVE ANY REACTIONS TO TRANSFUSION   • Hypotension     REPORTS \"MY BLOOD PRESSURE IS ALWAYS LOW - I HAVE NEVER BEEN ABLE TO DONATE BLOOD\"   • Impaired mobility     RELATED TO MULTIPLE SCLEROSIS   • MS (multiple sclerosis) (CMS/HCC)    • Tattoo    • Wears glasses        Family history is unknown by patient.     Past Surgical History:   Procedure Laterality Date   • APPENDECTOMY      REPORTS SHE THINKS SHE HAD APPENDIX REMOVED WHEN GALLBLADDER WAS REMOVED.   • CHOLECYSTECTOMY     • EYE SURGERY      Corneal implant, PATIENT REPORTS SHE IS UNSURE LATERALITY   • MOUTH SURGERY      FULL MOUTH EXTRACTION   • TOTAL SHOULDER ARTHROPLASTY Left 2/7/2019    Procedure: Left reverse total shoulder arthroplasty;  Surgeon: Eldon Valadez MD;  Location: [facility];  Service: Orthopedics        Review of Systems   HENT:          Left side facial pain     All other systems reviewed and are negative.          Objective   VITAL SIGNS:   Vitals:    02/12/20 1042   BP: 130/64   Pulse: 74   Resp: 16   Temp: 97.8 °F (36.6 °C)   TempSrc: Temporal   SpO2: (!) 89%  Comment: RA   Weight: 50.8 kg (111 lb 14.4 oz)   Height: 172.7 cm (68\")   PainSc: 0-No pain        KPS       90%    Physical Exam   Constitutional: She is oriented to person, place, and time. She appears well-developed and well-nourished. No distress.   HENT:   Head: Normocephalic and atraumatic.   Eyes: EOM are normal. No scleral icterus.   Neck: Neck supple.   Cardiovascular: Normal rate and regular rhythm.   Pulmonary/Chest: Effort normal and breath sounds normal.   Lymphadenopathy:     She has no cervical adenopathy.   Neurological: She is alert and oriented to person, place, and time. No cranial nerve deficit.   Nursing note and vitals reviewed.           The following portions of the patient's history were reviewed and updated as appropriate: allergies, " current medications, past family history, past medical history, past social history, past surgical history and problem list.    Assessment      IMPRESSION:   Maria Dolores Campa  is a very pleasant 79 y.o. female  he developed left facial pain approximately 10 years ago.  It resolved but in the last year has returned.  The pain is decreased with Tegretol and heat.  She said it is worse with brushing her teeth and chewing.  She describes the pain as sharp in the left maxillary region.  The right side is negative.  Since she increased Tegretol the pain has decreased and the attacks are less frequent.  She saw Dr. Nilo Colbert who is referred her for consideration of CyberKnife stereotactic radiosurgery to the left trigeminal nerve.        RECOMMENDATIONS:  I spent time with Mr. Mrs. Campa discussing the treatment for trigeminal neuralgia of stereotactic radiosurgery.  They had several questions today which I believe I answered to their satisfaction, We discussed the relative success including 70% chance for significant pain improvement, and a 15% chance for numbness on that side of the face.  The patient was agreeable for treatment with Cyberknife radiosurgery and signed consent today.  We will need to coordinate for a radiation planning session, and a high-resolution MRI of the brain for treatment planning.  Once those are completed, we should be able to begin treatment within 1-2 weeks pending insurance authorization.  I will prescribe 60 Gray in 1 fraction.  Thank you very much for letting me participate in the care of this very pleasant patient.         Sonam Calix MD      Errors in dictation may reflect use of voice recognition software and not all errors in transcription may have been detected prior to signing.

## 2020-02-28 ENCOUNTER — HOSPITAL ENCOUNTER (OUTPATIENT)
Dept: GENERAL RADIOLOGY | Facility: HOSPITAL | Age: 80
Discharge: HOME | End: 2020-02-28
Admitting: RADIOLOGY

## 2020-02-28 ENCOUNTER — HOSPITAL ENCOUNTER (OUTPATIENT)
Dept: MRI IMAGING | Facility: HOSPITAL | Age: 80
Discharge: HOME | End: 2020-02-28

## 2020-02-28 ENCOUNTER — HOSPITAL ENCOUNTER (OUTPATIENT)
Dept: RADIATION ONCOLOGY | Facility: HOSPITAL | Age: 80
Discharge: HOME | End: 2020-02-28

## 2020-02-28 VITALS
WEIGHT: 100.1 LBS | RESPIRATION RATE: 20 BRPM | DIASTOLIC BLOOD PRESSURE: 79 MMHG | HEIGHT: 66 IN | SYSTOLIC BLOOD PRESSURE: 123 MMHG | TEMPERATURE: 98.6 F | HEART RATE: 65 BPM | BODY MASS INDEX: 16.09 KG/M2 | OXYGEN SATURATION: 92 %

## 2020-02-28 DIAGNOSIS — G50.0 TRIGEMINAL NEURALGIA OF LEFT SIDE OF FACE: ICD-10-CM

## 2020-02-28 PROCEDURE — 0 GADOBENATE DIMEGLUMINE 529 MG/ML SOLUTION: Performed by: RADIOLOGY

## 2020-02-28 PROCEDURE — A9577 INJ MULTIHANCE: HCPCS | Performed by: RADIOLOGY

## 2020-02-28 PROCEDURE — 0 IOPAMIDOL 41 % SOLUTION: Performed by: RADIOLOGY

## 2020-02-28 PROCEDURE — 82565 ASSAY OF CREATININE: CPT

## 2020-02-28 PROCEDURE — 77290 THER RAD SIMULAJ FIELD CPLX: CPT | Performed by: RADIOLOGY

## 2020-02-28 PROCEDURE — 70553 MRI BRAIN STEM W/O & W/DYE: CPT

## 2020-02-28 PROCEDURE — 25010000003 LIDOCAINE 1 % SOLUTION: Performed by: RADIOLOGY

## 2020-02-28 PROCEDURE — 77334 RADIATION TREATMENT AID(S): CPT | Performed by: RADIOLOGY

## 2020-02-28 RX ORDER — LIDOCAINE HYDROCHLORIDE 10 MG/ML
10 INJECTION, SOLUTION INFILTRATION; PERINEURAL ONCE
Status: COMPLETED | OUTPATIENT
Start: 2020-02-28 | End: 2020-02-28

## 2020-02-28 RX ADMIN — IOPAMIDOL 20 ML: 408 INJECTION, SOLUTION INTRATHECAL at 13:15

## 2020-02-28 RX ADMIN — LIDOCAINE HYDROCHLORIDE 10 ML: 10 INJECTION, SOLUTION INFILTRATION; PERINEURAL at 13:16

## 2020-02-28 RX ADMIN — GADOBENATE DIMEGLUMINE 10 ML: 529 INJECTION, SOLUTION INTRAVENOUS at 16:41

## 2020-02-28 NOTE — POST-PROCEDURE NOTE
Radiology Procedure    Pre-procedure: procedure, risks discussed with patient. Patient indicated understanding and consented to procedure.     Procedure Performed: intrathecal injection for CT Cyberknife     IV Sedation and/or Anesthesia:  No    Complications: none    Preliminary Findings: pending    Specimen Removed: none    Estimated Blood Loss:  0ml    Post-Procedure Diagnosis: pending    Post-Procedure Plan: encourage fluids, bed rest x 2 hours, ct for cyberknife mapping    Standard Discharge Instructions Given:yes     ADALGISA Galarza  02/28/20  1:06 PM

## 2020-03-02 ENCOUNTER — TELEPHONE (OUTPATIENT)
Dept: INFUSION THERAPY | Facility: HOSPITAL | Age: 80
End: 2020-03-02

## 2020-03-02 ENCOUNTER — HOSPITAL ENCOUNTER (OUTPATIENT)
Dept: RADIATION ONCOLOGY | Facility: HOSPITAL | Age: 80
Setting detail: RADIATION/ONCOLOGY SERIES
Discharge: HOME | End: 2020-03-02

## 2020-03-05 PROCEDURE — 77334 RADIATION TREATMENT AID(S): CPT | Performed by: RADIOLOGY

## 2020-03-05 PROCEDURE — 77295 3-D RADIOTHERAPY PLAN: CPT | Performed by: RADIOLOGY

## 2020-03-05 PROCEDURE — 77300 RADIATION THERAPY DOSE PLAN: CPT | Performed by: RADIOLOGY

## 2020-03-06 LAB — CREAT BLDA-MCNC: 0.8 MG/DL (ref 0.6–1.3)

## 2020-03-12 ENCOUNTER — HOSPITAL ENCOUNTER (OUTPATIENT)
Dept: RADIATION ONCOLOGY | Facility: HOSPITAL | Age: 80
Discharge: HOME | End: 2020-03-12

## 2020-03-12 ENCOUNTER — CLINICAL SUPPORT NO REQUIREMENTS (OUTPATIENT)
Dept: NEUROSURGERY | Facility: CLINIC | Age: 80
End: 2020-03-12

## 2020-03-12 DIAGNOSIS — G50.0 TRIGEMINAL NEURALGIA OF LEFT SIDE OF FACE: ICD-10-CM

## 2020-03-12 PROCEDURE — 77372 SRS LINEAR BASED: CPT | Performed by: RADIOLOGY

## 2020-03-12 PROCEDURE — 77336 RADIATION PHYSICS CONSULT: CPT | Performed by: RADIOLOGY

## 2020-03-12 PROCEDURE — 77280 THER RAD SIMULAJ FIELD SMPL: CPT | Performed by: RADIOLOGY

## 2020-03-12 PROCEDURE — 61798 SRS CRANIAL LESION COMPLEX: CPT | Performed by: NEUROLOGICAL SURGERY

## 2020-03-12 NOTE — PROGRESS NOTES
NEUROSURGICAL OPERATIVE NOTE        PREOPERATIVE DIAGNOSIS:    Left trigeminal neuralgia      POSTOPERATIVE DIAGNOSIS:  Same      PROCEDURE:  CyberKnife radiosurgery for trigeminal neuralgia    Surgeon:  Johny Fournier M.D.      RADIATION ONCOLOGIST: Sonam Calix M.D.      ANESTHESIA: None      ESTIMATED BLOOD LOSS: None      SPECIMEN: None      DRAINS: None      COMPLICATIONS:  None      CLINICAL NOTE:  The patient is an 80-year-old woman with intractable left facial pain consistent with trigeminal neuralgia.  This has been unresponsive to medications.  As such, she presents at this time for CyberKnife radiosurgery for her left trigeminal neuralgia.  The nature of the procedure as well as the potential risks, complications, limitations, and alternatives to the procedure were discussed at length with the patient and the patient has agreed to proceed with surgery.      TECHNICAL NOTE:  Prior to treatment, the patient underwent the appropriate CT and MRI imaging studies, which were downloaded into the CyberKnife planning station. The data sets were fused. The left trigeminal nerve was contoured, as were adjacent critical structures, mainly the brain stem. A plan was then developed in conjunction with radiation physicist and radiation oncologist to deliver 60 Gy of single fraction.     On the day of treatment, the patient was brought to the CyberKnife suite. The previously fashioned Aquaplast mask was applied to her calvarium. Biplanar orthogonal skull x-rays were obtained and these images were registered with the digitally derived CT data set. Good registration was achieved. The patient then underwent her single fraction of radiation therapy without difficulty. She is to follow up in my clinic in several weeks from the time of treatment.             Johny Fournier M.D.

## 2020-04-07 NOTE — PROGRESS NOTES
TELEMEDICINE FOLLOW-UP NOTE    PATIENT:                                                      Maria Dolores Campa  MEDICAL RECORD #:                        [id number]  :                                                          1940  COMPLETION DATE:    3/12/2020  DIAGNOSIS:     Left trigeminal neuralgia    Problem List Items Addressed This Visit        Nervous and Auditory    Trigeminal neuralgia of left side of face - Primary        Time Devoted to Visit: 15 min including time to review previous records, with 75% devoted to direct discussion.    Reason for Visit:  I personally contacted Maria Dolores Campa  today in an effort to screen for coronavirus symptoms and also to perform her scheduled follow-up visit remotely in light of the coronavirus pandemic.  With respect to her specific risks for coronavirus, her screen is as follows:  COVID-19 RISK SCREEN     1. Has the patient had close contact without PPE with a lab confirmed COVID-19 (+) person or a person under investigation (PUI) for COVID-19 infection?  -- No     2. Has the patient provided care or had close contact with COVID-19(+) patient in a healthcare facility? --  No   She also denied any new respiratory symptoms or fever within the past 14 days.    I counseled her regarding safe practices for minimizing risk for infection including hand-washing, self-isolation, limiting contacts, and we also discussed what to do should she develop symptoms including fever, chills, new cough, shortness of breath, or new body aches.      BRIEF HISTORY:  Mrs. Campa is an 80 y.o. female with history of transient left-sided facial pain consistent with.  She is 1 month out from completion of CyberKnife radiosurgery, and has had a.  Over the past year, the pain intensified and was unresponsive to medications.  She therefore underwent CyberKnife radiosurgery to the left trigeminal nerve approximately 1 month ago.  She notes immediate pain relief following  radiosurgery and is very pleased to report no episodes of breakthrough pain.  She denies numbness or tingling of the face, and is now able to brush her teeth, swallow cold liquids, and chew foods on her left side without eliciting any pain.  She is no longer requiring heat application.  She continues to take carbamazepine twice daily, though admits to taking it more infrequently due to forgetfulness.  Of note, she also has a history of multiple sclerosis but denies any new or worsening neurologic symptoms.      EXAM FINDINGS AND/OR PROBLEMS:  Subjective findings as listed above. Resolution of previously noted left facial pain.  Unable to perform physical exam today as visit was conducted via telephone.  The patient did not have access to computer, tablet, or smartphone with camera capability.    However, I was able to request that the patient touch various dermatomes of her face, which she was able to do without subjective reports of any pain or anesthesia dolorosa.      KPS 90%    The following portions of the patient's history were reviewed and updated as appropriate: allergies, current medications, past family history, past medical history, past social history, past surgical history and problem list.      IMPRESSION:   Mrs. Campa is an 80 y.o. female with history of multiple sclerosis who completed CyberKnife radiosurgery for left trigeminal neuralgia approximately 1 month ago.  She has had an excellent early response in regards to complete palliation of pain.  She has no evidence of radiation-related toxicities.  We discussed expectations for response to treatment.  I answered several of her questions pertaining to the likelihood of refractory pain and the option of retreatment.  I recommended that at least for the time being, she continue on carbamazepine and if she has sustained pain relief over the next several weeks, we could consider tapering off her medication at that time.  She is scheduled for  follow-up with Dr. Fournier at the end of this month.      RECOMMENDATIONS:   We would be happy to follow-up with Mrs. Campa on an as-needed basis or should she require retreatment with radiosurgery in the future.    Return if symptoms worsen or fail to improve, for Office Visit.    Collin Calvin, APRN

## 2020-04-09 ENCOUNTER — OFFICE VISIT (OUTPATIENT)
Dept: RADIATION ONCOLOGY | Facility: HOSPITAL | Age: 80
End: 2020-04-09

## 2020-04-09 ENCOUNTER — HOSPITAL ENCOUNTER (OUTPATIENT)
Dept: RADIATION ONCOLOGY | Facility: HOSPITAL | Age: 80
Setting detail: RADIATION/ONCOLOGY SERIES
Discharge: HOME | End: 2020-04-09

## 2020-04-09 DIAGNOSIS — G50.0 TRIGEMINAL NEURALGIA OF LEFT SIDE OF FACE: Primary | ICD-10-CM

## 2020-04-22 ENCOUNTER — OFFICE VISIT (OUTPATIENT)
Dept: NEUROSURGERY | Facility: CLINIC | Age: 80
End: 2020-04-22

## 2020-04-22 VITALS — BODY MASS INDEX: 16.14 KG/M2 | HEIGHT: 66 IN

## 2020-04-22 DIAGNOSIS — G50.0 TRIGEMINAL NEURALGIA: Primary | ICD-10-CM

## 2020-04-22 PROCEDURE — 99024 POSTOP FOLLOW-UP VISIT: CPT | Performed by: NEUROLOGICAL SURGERY

## 2020-04-22 NOTE — PROGRESS NOTES
Patient: Maria Dolores Campa  : 1940    Primary Care Provider: Gregorio Jackson MD    Requesting Provider: As above    You have chosen to receive care through a telephone visit. Do you consent to use a telephone visit for your medical care today? Yes      History    Chief Complaint: Left-sided facial pain.    History of Present Illness: Ms. Campa is an 80-year-old woman with a history of intractable left facial pain.  She had taken Tegretol for a long time and that provided modest relief.  Given her ongoing symptoms she underwent CyberKnife radiosurgery on 3/12/2020.  60 Veloz was administered in a single fraction to her left trigeminal nerve.  Since the treatment she has had absolutely no facial pain.  She is down to taking her Tegretol once a day.  She is able to pursue activities such as talking and chewing without any difficulty.  She is very pleased with her progress.    Review of Systems   Constitutional: Negative for activity change, appetite change, chills, diaphoresis, fatigue, fever and unexpected weight change.   HENT: Negative for congestion, dental problem, drooling, ear discharge, ear pain, facial swelling, hearing loss, mouth sores, nosebleeds, postnasal drip, rhinorrhea, sinus pressure, sneezing, sore throat, tinnitus, trouble swallowing and voice change.    Eyes: Negative for photophobia, pain, discharge, redness, itching and visual disturbance.   Respiratory: Negative for apnea, cough, choking, chest tightness, shortness of breath, wheezing and stridor.    Cardiovascular: Negative for chest pain, palpitations and leg swelling.   Gastrointestinal: Negative for abdominal distention, abdominal pain, anal bleeding, blood in stool, constipation, diarrhea, nausea, rectal pain and vomiting.   Endocrine: Negative for cold intolerance, heat intolerance, polydipsia, polyphagia and polyuria.   Genitourinary: Negative for decreased urine volume, difficulty urinating, dysuria, enuresis, flank  "pain, frequency, genital sores, hematuria and urgency.   Musculoskeletal: Negative for arthralgias, back pain, gait problem, joint swelling, myalgias, neck pain and neck stiffness.   Skin: Negative for color change, pallor, rash and wound.   Allergic/Immunologic: Negative for environmental allergies, food allergies and immunocompromised state.   Neurological: Negative for dizziness, tremors, seizures, syncope, facial asymmetry, speech difficulty, weakness, light-headedness, numbness and headaches.   Hematological: Negative for adenopathy. Does not bruise/bleed easily.   Psychiatric/Behavioral: Negative for agitation, behavioral problems, confusion, decreased concentration, dysphoric mood, hallucinations, self-injury, sleep disturbance and suicidal ideas. The patient is not nervous/anxious and is not hyperactive.        The patient's past medical history, past surgical history, family history, and social history have been reviewed at length in the electronic medical record.    Physical Exam:   Ht 167.6 cm (66\")   LMP  (LMP Unknown)   BMI 16.14 kg/m²   No examination was performed given that this was a telephone visit.    Medical Decision Making    Diagnosis:   Left trigeminal neuralgia status post CyberKnife radiosurgery.    Treatment Options:   The patient is going to go ahead and stop her Tegretol altogether.  She will follow-up in our clinic in about 3 months to check on her progress.      No diagnosis found.    Scribed for Johny Fournier MD by Sunni James CMA on 04/22/2020 at 1:40 PM      I, Dr. Fournier, personally performed the services described in the documentation, as scribed in my presence, and it is both accurate and complete.  "

## 2021-01-01 ENCOUNTER — HOSPITAL ENCOUNTER (EMERGENCY)
Facility: HOSPITAL | Age: 81
Discharge: HOME | End: 2021-10-26
Attending: FAMILY MEDICINE | Admitting: FAMILY MEDICINE

## 2021-01-01 ENCOUNTER — HOSPITAL ENCOUNTER (INPATIENT)
Facility: HOSPITAL | Age: 81
LOS: 5 days | Discharge: HOME | End: 2021-09-15
Attending: EMERGENCY MEDICINE | Admitting: STUDENT IN AN ORGANIZED HEALTH CARE EDUCATION/TRAINING PROGRAM

## 2021-01-01 ENCOUNTER — APPOINTMENT (OUTPATIENT)
Dept: CT IMAGING | Facility: HOSPITAL | Age: 81
End: 2021-01-01

## 2021-01-01 ENCOUNTER — APPOINTMENT (OUTPATIENT)
Dept: GENERAL RADIOLOGY | Facility: HOSPITAL | Age: 81
End: 2021-01-01

## 2021-01-01 ENCOUNTER — READMISSION MANAGEMENT (OUTPATIENT)
Dept: CALL CENTER | Facility: HOSPITAL | Age: 81
End: 2021-01-01

## 2021-01-01 VITALS
WEIGHT: 100 LBS | HEART RATE: 68 BPM | TEMPERATURE: 97.8 F | OXYGEN SATURATION: 100 % | HEIGHT: 68 IN | DIASTOLIC BLOOD PRESSURE: 72 MMHG | RESPIRATION RATE: 17 BRPM | BODY MASS INDEX: 15.16 KG/M2 | SYSTOLIC BLOOD PRESSURE: 161 MMHG

## 2021-01-01 VITALS
HEIGHT: 64 IN | WEIGHT: 100.09 LBS | OXYGEN SATURATION: 95 % | TEMPERATURE: 98.5 F | RESPIRATION RATE: 18 BRPM | SYSTOLIC BLOOD PRESSURE: 140 MMHG | DIASTOLIC BLOOD PRESSURE: 79 MMHG | HEART RATE: 73 BPM | BODY MASS INDEX: 17.09 KG/M2

## 2021-01-01 DIAGNOSIS — R29.6 FREQUENT FALLS: ICD-10-CM

## 2021-01-01 DIAGNOSIS — R53.81 DECLINING FUNCTIONAL STATUS: ICD-10-CM

## 2021-01-01 DIAGNOSIS — N39.0 URINARY TRACT INFECTION WITHOUT HEMATURIA, SITE UNSPECIFIED: ICD-10-CM

## 2021-01-01 DIAGNOSIS — R00.0 TACHYARRHYTHMIA: ICD-10-CM

## 2021-01-01 DIAGNOSIS — S01.01XA LACERATION OF SCALP WITHOUT FOREIGN BODY, INITIAL ENCOUNTER: Primary | ICD-10-CM

## 2021-01-01 DIAGNOSIS — R41.82 ALTERED MENTAL STATUS, UNSPECIFIED ALTERED MENTAL STATUS TYPE: Primary | ICD-10-CM

## 2021-01-01 DIAGNOSIS — R00.1 BRADYCARDIA: ICD-10-CM

## 2021-01-01 DIAGNOSIS — G35 MULTIPLE SCLEROSIS (HCC): ICD-10-CM

## 2021-01-01 LAB
ALBUMIN SERPL-MCNC: 3.2 G/DL (ref 3.5–5.2)
ALBUMIN SERPL-MCNC: 4.1 G/DL (ref 3.5–5.2)
ALBUMIN/GLOB SERPL: 1.2 G/DL
ALBUMIN/GLOB SERPL: 1.2 G/DL
ALP SERPL-CCNC: 122 U/L (ref 39–117)
ALP SERPL-CCNC: 96 U/L (ref 39–117)
ALT SERPL W P-5'-P-CCNC: 11 U/L (ref 1–33)
ALT SERPL W P-5'-P-CCNC: 15 U/L (ref 1–33)
ANION GAP SERPL CALCULATED.3IONS-SCNC: 10 MMOL/L (ref 5–15)
ANION GAP SERPL CALCULATED.3IONS-SCNC: 7.6 MMOL/L (ref 5–15)
AST SERPL-CCNC: 20 U/L (ref 1–32)
AST SERPL-CCNC: 90 U/L (ref 1–32)
BACTERIA SPEC AEROBE CULT: ABNORMAL
BACTERIA UR QL AUTO: ABNORMAL /HPF
BASOPHILS # BLD AUTO: 0.02 10*3/MM3 (ref 0–0.2)
BASOPHILS # BLD AUTO: 0.03 10*3/MM3 (ref 0–0.2)
BASOPHILS NFR BLD AUTO: 0.3 % (ref 0–1.5)
BASOPHILS NFR BLD AUTO: 0.7 % (ref 0–1.5)
BILIRUB SERPL-MCNC: 0.3 MG/DL (ref 0–1.2)
BILIRUB SERPL-MCNC: <0.2 MG/DL (ref 0–1.2)
BILIRUB UR QL STRIP: NEGATIVE
BILIRUB UR QL STRIP: NEGATIVE
BUN SERPL-MCNC: 20 MG/DL (ref 8–23)
BUN SERPL-MCNC: 22 MG/DL (ref 8–23)
BUN/CREAT SERPL: 29.3 (ref 7–25)
BUN/CREAT SERPL: 29.9 (ref 7–25)
CALCIUM SPEC-SCNC: 10.3 MG/DL (ref 8.6–10.5)
CALCIUM SPEC-SCNC: 8.9 MG/DL (ref 8.6–10.5)
CHLORIDE SERPL-SCNC: 101 MMOL/L (ref 98–107)
CHLORIDE SERPL-SCNC: 98 MMOL/L (ref 98–107)
CLARITY UR: ABNORMAL
CLARITY UR: CLEAR
CO2 SERPL-SCNC: 27.4 MMOL/L (ref 22–29)
CO2 SERPL-SCNC: 30 MMOL/L (ref 22–29)
COLOR UR: YELLOW
COLOR UR: YELLOW
CREAT SERPL-MCNC: 0.67 MG/DL (ref 0.57–1)
CREAT SERPL-MCNC: 0.75 MG/DL (ref 0.57–1)
DEPRECATED RDW RBC AUTO: 45.9 FL (ref 37–54)
DEPRECATED RDW RBC AUTO: 47.8 FL (ref 37–54)
EOSINOPHIL # BLD AUTO: 0.03 10*3/MM3 (ref 0–0.4)
EOSINOPHIL # BLD AUTO: 0.04 10*3/MM3 (ref 0–0.4)
EOSINOPHIL NFR BLD AUTO: 0.6 % (ref 0.3–6.2)
EOSINOPHIL NFR BLD AUTO: 0.7 % (ref 0.3–6.2)
ERYTHROCYTE [DISTWIDTH] IN BLOOD BY AUTOMATED COUNT: 13.4 % (ref 12.3–15.4)
ERYTHROCYTE [DISTWIDTH] IN BLOOD BY AUTOMATED COUNT: 13.6 % (ref 12.3–15.4)
GFR SERPL CREATININE-BSD FRML MDRD: 74 ML/MIN/1.73
GFR SERPL CREATININE-BSD FRML MDRD: 84 ML/MIN/1.73
GLOBULIN UR ELPH-MCNC: 2.7 GM/DL
GLOBULIN UR ELPH-MCNC: 3.4 GM/DL
GLUCOSE SERPL-MCNC: 101 MG/DL (ref 65–99)
GLUCOSE SERPL-MCNC: 89 MG/DL (ref 65–99)
GLUCOSE UR STRIP-MCNC: NEGATIVE MG/DL
GLUCOSE UR STRIP-MCNC: NEGATIVE MG/DL
HCT VFR BLD AUTO: 37.7 % (ref 34–46.6)
HCT VFR BLD AUTO: 48.6 % (ref 34–46.6)
HGB BLD-MCNC: 12.1 G/DL (ref 12–15.9)
HGB BLD-MCNC: 15.4 G/DL (ref 12–15.9)
HGB UR QL STRIP.AUTO: NEGATIVE
HGB UR QL STRIP.AUTO: NEGATIVE
HOLD SPECIMEN: NORMAL
HOLD SPECIMEN: NORMAL
HYALINE CASTS UR QL AUTO: ABNORMAL /LPF
IMM GRANULOCYTES # BLD AUTO: 0.01 10*3/MM3 (ref 0–0.05)
IMM GRANULOCYTES # BLD AUTO: 0.04 10*3/MM3 (ref 0–0.05)
IMM GRANULOCYTES NFR BLD AUTO: 0.2 % (ref 0–0.5)
IMM GRANULOCYTES NFR BLD AUTO: 0.6 % (ref 0–0.5)
KETONES UR QL STRIP: NEGATIVE
KETONES UR QL STRIP: NEGATIVE
LEUKOCYTE ESTERASE UR QL STRIP.AUTO: ABNORMAL
LEUKOCYTE ESTERASE UR QL STRIP.AUTO: NEGATIVE
LYMPHOCYTES # BLD AUTO: 0.89 10*3/MM3 (ref 0.7–3.1)
LYMPHOCYTES # BLD AUTO: 1.21 10*3/MM3 (ref 0.7–3.1)
LYMPHOCYTES NFR BLD AUTO: 19.6 % (ref 19.6–45.3)
LYMPHOCYTES NFR BLD AUTO: 19.8 % (ref 19.6–45.3)
MAGNESIUM SERPL-MCNC: 2.2 MG/DL (ref 1.6–2.4)
MCH RBC QN AUTO: 29.6 PG (ref 26.6–33)
MCH RBC QN AUTO: 30.1 PG (ref 26.6–33)
MCHC RBC AUTO-ENTMCNC: 31.7 G/DL (ref 31.5–35.7)
MCHC RBC AUTO-ENTMCNC: 32.1 G/DL (ref 31.5–35.7)
MCV RBC AUTO: 92.2 FL (ref 79–97)
MCV RBC AUTO: 94.9 FL (ref 79–97)
MONOCYTES # BLD AUTO: 0.3 10*3/MM3 (ref 0.1–0.9)
MONOCYTES # BLD AUTO: 0.31 10*3/MM3 (ref 0.1–0.9)
MONOCYTES NFR BLD AUTO: 5 % (ref 5–12)
MONOCYTES NFR BLD AUTO: 6.7 % (ref 5–12)
NEUTROPHILS NFR BLD AUTO: 3.24 10*3/MM3 (ref 1.7–7)
NEUTROPHILS NFR BLD AUTO: 4.54 10*3/MM3 (ref 1.7–7)
NEUTROPHILS NFR BLD AUTO: 71.9 % (ref 42.7–76)
NEUTROPHILS NFR BLD AUTO: 73.9 % (ref 42.7–76)
NITRITE UR QL STRIP: NEGATIVE
NITRITE UR QL STRIP: NEGATIVE
NRBC BLD AUTO-RTO: 0 /100 WBC (ref 0–0.2)
NRBC BLD AUTO-RTO: 0 /100 WBC (ref 0–0.2)
PH UR STRIP.AUTO: 7 [PH] (ref 5–8)
PH UR STRIP.AUTO: 8 [PH] (ref 5–8)
PLATELET # BLD AUTO: 177 10*3/MM3 (ref 140–450)
PLATELET # BLD AUTO: 223 10*3/MM3 (ref 140–450)
PMV BLD AUTO: 10.8 FL (ref 6–12)
PMV BLD AUTO: 10.9 FL (ref 6–12)
POTASSIUM SERPL-SCNC: 4.1 MMOL/L (ref 3.5–5.2)
POTASSIUM SERPL-SCNC: 5.1 MMOL/L (ref 3.5–5.2)
PROT SERPL-MCNC: 5.9 G/DL (ref 6–8.5)
PROT SERPL-MCNC: 7.5 G/DL (ref 6–8.5)
PROT UR QL STRIP: NEGATIVE
PROT UR QL STRIP: NEGATIVE
RBC # BLD AUTO: 4.09 10*6/MM3 (ref 3.77–5.28)
RBC # BLD AUTO: 5.12 10*6/MM3 (ref 3.77–5.28)
RBC # UR: ABNORMAL /HPF
REF LAB TEST METHOD: ABNORMAL
SARS-COV-2 RNA PNL SPEC NAA+PROBE: NOT DETECTED
SODIUM SERPL-SCNC: 136 MMOL/L (ref 136–145)
SODIUM SERPL-SCNC: 138 MMOL/L (ref 136–145)
SP GR UR STRIP: 1.01 (ref 1–1.03)
SP GR UR STRIP: 1.02 (ref 1–1.03)
SQUAMOUS #/AREA URNS HPF: ABNORMAL /HPF
TROPONIN T SERPL-MCNC: <0.01 NG/ML (ref 0–0.03)
UROBILINOGEN UR QL STRIP: ABNORMAL
UROBILINOGEN UR QL STRIP: ABNORMAL
WBC # BLD AUTO: 4.5 10*3/MM3 (ref 3.4–10.8)
WBC # BLD AUTO: 6.16 10*3/MM3 (ref 3.4–10.8)
WBC UR QL AUTO: ABNORMAL /HPF
WHOLE BLOOD HOLD SPECIMEN: NORMAL
WHOLE BLOOD HOLD SPECIMEN: NORMAL

## 2021-01-01 PROCEDURE — 99232 SBSQ HOSP IP/OBS MODERATE 35: CPT | Performed by: STUDENT IN AN ORGANIZED HEALTH CARE EDUCATION/TRAINING PROGRAM

## 2021-01-01 PROCEDURE — 92960 CARDIOVERSION ELECTRIC EXT: CPT

## 2021-01-01 PROCEDURE — 25010000002 AMIODARONE IN DEXTROSE 5% 150-4.21 MG/100ML-% SOLUTION: Performed by: EMERGENCY MEDICINE

## 2021-01-01 PROCEDURE — 80053 COMPREHEN METABOLIC PANEL: CPT | Performed by: EMERGENCY MEDICINE

## 2021-01-01 PROCEDURE — 25010000002 AMIODARONE IN DEXTROSE 5% 360-4.14 MG/200ML-% SOLUTION: Performed by: EMERGENCY MEDICINE

## 2021-01-01 PROCEDURE — 94799 UNLISTED PULMONARY SVC/PX: CPT

## 2021-01-01 PROCEDURE — 81001 URINALYSIS AUTO W/SCOPE: CPT | Performed by: EMERGENCY MEDICINE

## 2021-01-01 PROCEDURE — 99233 SBSQ HOSP IP/OBS HIGH 50: CPT | Performed by: INTERNAL MEDICINE

## 2021-01-01 PROCEDURE — 84484 ASSAY OF TROPONIN QUANT: CPT | Performed by: EMERGENCY MEDICINE

## 2021-01-01 PROCEDURE — 97535 SELF CARE MNGMENT TRAINING: CPT

## 2021-01-01 PROCEDURE — 87077 CULTURE AEROBIC IDENTIFY: CPT | Performed by: EMERGENCY MEDICINE

## 2021-01-01 PROCEDURE — 25010000002 ENOXAPARIN PER 10 MG: Performed by: INTERNAL MEDICINE

## 2021-01-01 PROCEDURE — 99232 SBSQ HOSP IP/OBS MODERATE 35: CPT | Performed by: INTERNAL MEDICINE

## 2021-01-01 PROCEDURE — 97110 THERAPEUTIC EXERCISES: CPT

## 2021-01-01 PROCEDURE — 70450 CT HEAD/BRAIN W/O DYE: CPT

## 2021-01-01 PROCEDURE — 93005 ELECTROCARDIOGRAM TRACING: CPT | Performed by: EMERGENCY MEDICINE

## 2021-01-01 PROCEDURE — 85025 COMPLETE CBC W/AUTO DIFF WBC: CPT | Performed by: EMERGENCY MEDICINE

## 2021-01-01 PROCEDURE — 87086 URINE CULTURE/COLONY COUNT: CPT | Performed by: EMERGENCY MEDICINE

## 2021-01-01 PROCEDURE — 25010000002 CEFTRIAXONE SODIUM-DEXTROSE 1-3.74 GM-%(50ML) RECONSTITUTED SOLUTION: Performed by: EMERGENCY MEDICINE

## 2021-01-01 PROCEDURE — 71045 X-RAY EXAM CHEST 1 VIEW: CPT

## 2021-01-01 PROCEDURE — 99222 1ST HOSP IP/OBS MODERATE 55: CPT | Performed by: INTERNAL MEDICINE

## 2021-01-01 PROCEDURE — 25010000002 VANCOMYCIN 1 G RECONSTITUTED SOLUTION 1 EACH VIAL: Performed by: STUDENT IN AN ORGANIZED HEALTH CARE EDUCATION/TRAINING PROGRAM

## 2021-01-01 PROCEDURE — 80053 COMPREHEN METABOLIC PANEL: CPT | Performed by: INTERNAL MEDICINE

## 2021-01-01 PROCEDURE — 97165 OT EVAL LOW COMPLEX 30 MIN: CPT

## 2021-01-01 PROCEDURE — 85025 COMPLETE CBC W/AUTO DIFF WBC: CPT | Performed by: INTERNAL MEDICINE

## 2021-01-01 PROCEDURE — 99285 EMERGENCY DEPT VISIT HI MDM: CPT

## 2021-01-01 PROCEDURE — 25010000002 ATROPINE PER 0.01 MG: Performed by: EMERGENCY MEDICINE

## 2021-01-01 PROCEDURE — 5A2204Z RESTORATION OF CARDIAC RHYTHM, SINGLE: ICD-10-PCS | Performed by: EMERGENCY MEDICINE

## 2021-01-01 PROCEDURE — 87635 SARS-COV-2 COVID-19 AMP PRB: CPT | Performed by: EMERGENCY MEDICINE

## 2021-01-01 PROCEDURE — 83735 ASSAY OF MAGNESIUM: CPT | Performed by: EMERGENCY MEDICINE

## 2021-01-01 PROCEDURE — 25010000002 CEFTRIAXONE SODIUM-DEXTROSE 1-3.74 GM-%(50ML) RECONSTITUTED SOLUTION: Performed by: INTERNAL MEDICINE

## 2021-01-01 PROCEDURE — 25010000002 FENTANYL CITRATE (PF) 50 MCG/ML SOLUTION: Performed by: EMERGENCY MEDICINE

## 2021-01-01 PROCEDURE — 81003 URINALYSIS AUTO W/O SCOPE: CPT | Performed by: INTERNAL MEDICINE

## 2021-01-01 PROCEDURE — 99239 HOSP IP/OBS DSCHRG MGMT >30: CPT | Performed by: INTERNAL MEDICINE

## 2021-01-01 PROCEDURE — 99282 EMERGENCY DEPT VISIT SF MDM: CPT

## 2021-01-01 PROCEDURE — 97161 PT EVAL LOW COMPLEX 20 MIN: CPT

## 2021-01-01 PROCEDURE — 94640 AIRWAY INHALATION TREATMENT: CPT

## 2021-01-01 RX ORDER — IPRATROPIUM BROMIDE AND ALBUTEROL SULFATE 2.5; .5 MG/3ML; MG/3ML
3 SOLUTION RESPIRATORY (INHALATION)
Status: DISCONTINUED | OUTPATIENT
Start: 2021-01-01 | End: 2021-01-01

## 2021-01-01 RX ORDER — ACETAMINOPHEN 325 MG/1
650 TABLET ORAL EVERY 6 HOURS PRN
Status: DISCONTINUED | OUTPATIENT
Start: 2021-01-01 | End: 2021-01-01 | Stop reason: HOSPADM

## 2021-01-01 RX ORDER — AMIODARONE HYDROCHLORIDE 200 MG/1
200 TABLET ORAL
Status: DISCONTINUED | OUTPATIENT
Start: 2021-01-01 | End: 2021-01-01 | Stop reason: HOSPADM

## 2021-01-01 RX ORDER — CARBAMAZEPINE 200 MG/1
200 TABLET ORAL 2 TIMES DAILY
Status: DISCONTINUED | OUTPATIENT
Start: 2021-01-01 | End: 2021-01-01

## 2021-01-01 RX ORDER — BUDESONIDE AND FORMOTEROL FUMARATE DIHYDRATE 160; 4.5 UG/1; UG/1
2 AEROSOL RESPIRATORY (INHALATION)
Status: DISCONTINUED | OUTPATIENT
Start: 2021-01-01 | End: 2021-01-01 | Stop reason: HOSPADM

## 2021-01-01 RX ORDER — CEFTRIAXONE 1 G/50ML
1 INJECTION, SOLUTION INTRAVENOUS EVERY 24 HOURS
Status: DISCONTINUED | OUTPATIENT
Start: 2021-01-01 | End: 2021-01-01

## 2021-01-01 RX ORDER — SODIUM CHLORIDE 0.9 % (FLUSH) 0.9 %
10 SYRINGE (ML) INJECTION EVERY 12 HOURS SCHEDULED
Status: DISCONTINUED | OUTPATIENT
Start: 2021-01-01 | End: 2021-01-01 | Stop reason: HOSPADM

## 2021-01-01 RX ORDER — CEFTRIAXONE 1 G/50ML
1 INJECTION, SOLUTION INTRAVENOUS ONCE
Status: COMPLETED | OUTPATIENT
Start: 2021-01-01 | End: 2021-01-01

## 2021-01-01 RX ORDER — CEFTRIAXONE 1 G/50ML
1 INJECTION, SOLUTION INTRAVENOUS EVERY 24 HOURS
Status: COMPLETED | OUTPATIENT
Start: 2021-01-01 | End: 2021-01-01

## 2021-01-01 RX ORDER — HYDROCODONE BITARTRATE AND ACETAMINOPHEN 5; 325 MG/1; MG/1
1 TABLET ORAL EVERY 8 HOURS PRN
Status: ON HOLD | COMMUNITY
End: 2021-01-01 | Stop reason: SDUPTHER

## 2021-01-01 RX ORDER — FENTANYL CITRATE 50 UG/ML
50 INJECTION, SOLUTION INTRAMUSCULAR; INTRAVENOUS ONCE
Status: COMPLETED | OUTPATIENT
Start: 2021-01-01 | End: 2021-01-01

## 2021-01-01 RX ORDER — IPRATROPIUM BROMIDE AND ALBUTEROL SULFATE 2.5; .5 MG/3ML; MG/3ML
3 SOLUTION RESPIRATORY (INHALATION) EVERY 4 HOURS PRN
Status: DISCONTINUED | OUTPATIENT
Start: 2021-01-01 | End: 2021-01-01 | Stop reason: HOSPADM

## 2021-01-01 RX ORDER — DOXYCYCLINE 100 MG/1
100 CAPSULE ORAL EVERY 12 HOURS SCHEDULED
Qty: 10 CAPSULE | Refills: 0 | Status: SHIPPED | OUTPATIENT
Start: 2021-01-01 | End: 2021-01-01

## 2021-01-01 RX ORDER — DOXYCYCLINE 100 MG/1
100 CAPSULE ORAL EVERY 12 HOURS SCHEDULED
Status: DISCONTINUED | OUTPATIENT
Start: 2021-01-01 | End: 2021-01-01 | Stop reason: HOSPADM

## 2021-01-01 RX ORDER — AMIODARONE HYDROCHLORIDE 200 MG/1
200 TABLET ORAL EVERY 12 HOURS SCHEDULED
Status: DISCONTINUED | OUTPATIENT
Start: 2021-01-01 | End: 2021-01-01

## 2021-01-01 RX ORDER — PAROXETINE HYDROCHLORIDE 20 MG/1
20 TABLET, FILM COATED ORAL EVERY MORNING
Status: DISCONTINUED | OUTPATIENT
Start: 2021-01-01 | End: 2021-01-01 | Stop reason: HOSPADM

## 2021-01-01 RX ORDER — AMIODARONE HYDROCHLORIDE 200 MG/1
200 TABLET ORAL
Qty: 30 TABLET | Refills: 1 | Status: SHIPPED | OUTPATIENT
Start: 2021-01-01

## 2021-01-01 RX ORDER — NICOTINE 21 MG/24HR
1 PATCH, TRANSDERMAL 24 HOURS TRANSDERMAL EVERY 24 HOURS
Status: DISCONTINUED | OUTPATIENT
Start: 2021-01-01 | End: 2021-01-01 | Stop reason: HOSPADM

## 2021-01-01 RX ORDER — SODIUM CHLORIDE 0.9 % (FLUSH) 0.9 %
10 SYRINGE (ML) INJECTION AS NEEDED
Status: DISCONTINUED | OUTPATIENT
Start: 2021-01-01 | End: 2021-01-01 | Stop reason: HOSPADM

## 2021-01-01 RX ORDER — NICOTINE 21 MG/24HR
1 PATCH, TRANSDERMAL 24 HOURS TRANSDERMAL EVERY 24 HOURS
Qty: 30 PATCH | Refills: 0
Start: 2021-01-01

## 2021-01-01 RX ORDER — ATROPINE SULFATE 1 MG/ML
INJECTION, SOLUTION INTRAMUSCULAR; INTRAVENOUS; SUBCUTANEOUS
Status: COMPLETED | OUTPATIENT
Start: 2021-01-01 | End: 2021-01-01

## 2021-01-01 RX ORDER — PROPRANOLOL HYDROCHLORIDE 20 MG/1
20 TABLET ORAL 3 TIMES DAILY
Status: DISCONTINUED | OUTPATIENT
Start: 2021-01-01 | End: 2021-01-01

## 2021-01-01 RX ORDER — SODIUM CHLORIDE 0.9 % (FLUSH) 0.9 %
10 SYRINGE (ML) INJECTION AS NEEDED
Status: DISCONTINUED | OUTPATIENT
Start: 2021-01-01 | End: 2021-01-01

## 2021-01-01 RX ORDER — HYDROCODONE BITARTRATE AND ACETAMINOPHEN 5; 325 MG/1; MG/1
1 TABLET ORAL EVERY 8 HOURS PRN
Start: 2021-01-01

## 2021-01-01 RX ORDER — SODIUM CHLORIDE, SODIUM LACTATE, POTASSIUM CHLORIDE, CALCIUM CHLORIDE 600; 310; 30; 20 MG/100ML; MG/100ML; MG/100ML; MG/100ML
100 INJECTION, SOLUTION INTRAVENOUS CONTINUOUS
Status: DISCONTINUED | OUTPATIENT
Start: 2021-01-01 | End: 2021-01-01

## 2021-01-01 RX ORDER — AMOXICILLIN 250 MG
2 CAPSULE ORAL NIGHTLY
Status: DISCONTINUED | OUTPATIENT
Start: 2021-01-01 | End: 2021-01-01 | Stop reason: HOSPADM

## 2021-01-01 RX ADMIN — SODIUM CHLORIDE, PRESERVATIVE FREE 10 ML: 5 INJECTION INTRAVENOUS at 08:10

## 2021-01-01 RX ADMIN — APIXABAN 2.5 MG: 2.5 TABLET, FILM COATED ORAL at 21:45

## 2021-01-01 RX ADMIN — IPRATROPIUM BROMIDE AND ALBUTEROL SULFATE 3 ML: .5; 3 SOLUTION RESPIRATORY (INHALATION) at 06:48

## 2021-01-01 RX ADMIN — SODIUM CHLORIDE, PRESERVATIVE FREE 10 ML: 5 INJECTION INTRAVENOUS at 21:13

## 2021-01-01 RX ADMIN — VANCOMYCIN HYDROCHLORIDE 1000 MG: 1 INJECTION, POWDER, LYOPHILIZED, FOR SOLUTION INTRAVENOUS at 15:32

## 2021-01-01 RX ADMIN — Medication 1 PATCH: at 00:01

## 2021-01-01 RX ADMIN — SODIUM CHLORIDE, POTASSIUM CHLORIDE, SODIUM LACTATE AND CALCIUM CHLORIDE 100 ML/HR: 600; 310; 30; 20 INJECTION, SOLUTION INTRAVENOUS at 06:05

## 2021-01-01 RX ADMIN — ACETAMINOPHEN 650 MG: 325 TABLET ORAL at 10:09

## 2021-01-01 RX ADMIN — SODIUM CHLORIDE, PRESERVATIVE FREE 10 ML: 5 INJECTION INTRAVENOUS at 09:05

## 2021-01-01 RX ADMIN — CARBAMAZEPINE 200 MG: 200 TABLET ORAL at 23:30

## 2021-01-01 RX ADMIN — ACETAMINOPHEN 650 MG: 325 TABLET ORAL at 02:57

## 2021-01-01 RX ADMIN — AMIODARONE HYDROCHLORIDE 200 MG: 200 TABLET ORAL at 09:04

## 2021-01-01 RX ADMIN — AMIODARONE HYDROCHLORIDE 200 MG: 200 TABLET ORAL at 08:10

## 2021-01-01 RX ADMIN — CEFTRIAXONE 1 G: 1 INJECTION, SOLUTION INTRAVENOUS at 20:29

## 2021-01-01 RX ADMIN — DOXYCYCLINE 100 MG: 100 CAPSULE ORAL at 09:08

## 2021-01-01 RX ADMIN — AMIODARONE HYDROCHLORIDE 200 MG: 200 TABLET ORAL at 11:22

## 2021-01-01 RX ADMIN — DOCUSATE SODIUM 50MG AND SENNOSIDES 8.6MG 2 TABLET: 8.6; 5 TABLET, FILM COATED ORAL at 21:11

## 2021-01-01 RX ADMIN — ENOXAPARIN SODIUM 30 MG: 30 INJECTION SUBCUTANEOUS at 00:01

## 2021-01-01 RX ADMIN — CEFTRIAXONE 1 G: 1 INJECTION, SOLUTION INTRAVENOUS at 21:08

## 2021-01-01 RX ADMIN — DOCUSATE SODIUM 50MG AND SENNOSIDES 8.6MG 2 TABLET: 8.6; 5 TABLET, FILM COATED ORAL at 23:30

## 2021-01-01 RX ADMIN — SODIUM CHLORIDE, PRESERVATIVE FREE 10 ML: 5 INJECTION INTRAVENOUS at 20:54

## 2021-01-01 RX ADMIN — DOCUSATE SODIUM 50MG AND SENNOSIDES 8.6MG 2 TABLET: 8.6; 5 TABLET, FILM COATED ORAL at 20:54

## 2021-01-01 RX ADMIN — APIXABAN 2.5 MG: 2.5 TABLET, FILM COATED ORAL at 08:10

## 2021-01-01 RX ADMIN — APIXABAN 2.5 MG: 2.5 TABLET, FILM COATED ORAL at 20:54

## 2021-01-01 RX ADMIN — Medication 1 PATCH: at 02:36

## 2021-01-01 RX ADMIN — AMIODARONE HYDROCHLORIDE 0.5 MG/MIN: 1.8 INJECTION, SOLUTION INTRAVENOUS at 03:10

## 2021-01-01 RX ADMIN — AMIODARONE HYDROCHLORIDE 150 MG: 1.5 INJECTION, SOLUTION INTRAVENOUS at 21:35

## 2021-01-01 RX ADMIN — CARBAMAZEPINE 200 MG: 200 TABLET ORAL at 09:02

## 2021-01-01 RX ADMIN — SODIUM CHLORIDE, PRESERVATIVE FREE 10 ML: 5 INJECTION INTRAVENOUS at 09:03

## 2021-01-01 RX ADMIN — ATROPINE SULFATE 0.5 MG: 1 INJECTION, SOLUTION INTRAMUSCULAR; INTRAVENOUS; SUBCUTANEOUS at 20:42

## 2021-01-01 RX ADMIN — PAROXETINE HYDROCHLORIDE 20 MG: 20 TABLET, FILM COATED ORAL at 07:31

## 2021-01-01 RX ADMIN — SODIUM CHLORIDE, PRESERVATIVE FREE 10 ML: 5 INJECTION INTRAVENOUS at 21:45

## 2021-01-01 RX ADMIN — VANCOMYCIN HYDROCHLORIDE 1000 MG: 1 INJECTION, POWDER, LYOPHILIZED, FOR SOLUTION INTRAVENOUS at 14:22

## 2021-01-01 RX ADMIN — IPRATROPIUM BROMIDE AND ALBUTEROL SULFATE 3 ML: .5; 3 SOLUTION RESPIRATORY (INHALATION) at 20:02

## 2021-01-01 RX ADMIN — DOCUSATE SODIUM 50MG AND SENNOSIDES 8.6MG 2 TABLET: 8.6; 5 TABLET, FILM COATED ORAL at 21:45

## 2021-01-01 RX ADMIN — FENTANYL CITRATE 50 MCG: 50 INJECTION INTRAMUSCULAR; INTRAVENOUS at 22:31

## 2021-01-01 RX ADMIN — SODIUM CHLORIDE, PRESERVATIVE FREE 10 ML: 5 INJECTION INTRAVENOUS at 10:19

## 2021-01-01 RX ADMIN — SODIUM CHLORIDE, POTASSIUM CHLORIDE, SODIUM LACTATE AND CALCIUM CHLORIDE 100 ML/HR: 600; 310; 30; 20 INJECTION, SOLUTION INTRAVENOUS at 08:52

## 2021-01-01 RX ADMIN — CARBAMAZEPINE 200 MG: 200 TABLET ORAL at 21:10

## 2021-01-01 RX ADMIN — PAROXETINE HYDROCHLORIDE 20 MG: 20 TABLET, FILM COATED ORAL at 06:36

## 2021-01-01 RX ADMIN — SODIUM CHLORIDE, PRESERVATIVE FREE 10 ML: 5 INJECTION INTRAVENOUS at 08:53

## 2021-01-01 RX ADMIN — SODIUM CHLORIDE, POTASSIUM CHLORIDE, SODIUM LACTATE AND CALCIUM CHLORIDE 100 ML/HR: 600; 310; 30; 20 INJECTION, SOLUTION INTRAVENOUS at 03:12

## 2021-01-01 RX ADMIN — APIXABAN 2.5 MG: 2.5 TABLET, FILM COATED ORAL at 09:04

## 2021-01-01 RX ADMIN — AMIODARONE HYDROCHLORIDE 200 MG: 200 TABLET ORAL at 21:10

## 2021-01-01 RX ADMIN — AMIODARONE HYDROCHLORIDE 200 MG: 200 TABLET ORAL at 10:18

## 2021-01-01 RX ADMIN — CEFTRIAXONE 1 G: 1 INJECTION, SOLUTION INTRAVENOUS at 21:31

## 2021-01-01 RX ADMIN — PAROXETINE HYDROCHLORIDE 20 MG: 20 TABLET, FILM COATED ORAL at 06:05

## 2021-01-01 RX ADMIN — SODIUM CHLORIDE, PRESERVATIVE FREE 10 ML: 5 INJECTION INTRAVENOUS at 03:11

## 2021-01-01 RX ADMIN — SODIUM CHLORIDE 1000 ML: 9 INJECTION, SOLUTION INTRAVENOUS at 20:30

## 2021-01-01 RX ADMIN — Medication 1 PATCH: at 01:48

## 2021-01-01 RX ADMIN — ENOXAPARIN SODIUM 40 MG: 40 INJECTION SUBCUTANEOUS at 03:10

## 2021-01-01 RX ADMIN — CARBAMAZEPINE 200 MG: 200 TABLET ORAL at 03:11

## 2021-01-01 RX ADMIN — SODIUM CHLORIDE, POTASSIUM CHLORIDE, SODIUM LACTATE AND CALCIUM CHLORIDE 100 ML/HR: 600; 310; 30; 20 INJECTION, SOLUTION INTRAVENOUS at 15:41

## 2021-01-01 RX ADMIN — BUDESONIDE AND FORMOTEROL FUMARATE DIHYDRATE 2 PUFF: 160; 4.5 AEROSOL RESPIRATORY (INHALATION) at 20:02

## 2021-01-01 RX ADMIN — AMIODARONE HYDROCHLORIDE 1 MG/MIN: 1.8 INJECTION, SOLUTION INTRAVENOUS at 21:41

## 2021-01-01 RX ADMIN — PAROXETINE HYDROCHLORIDE 20 MG: 20 TABLET, FILM COATED ORAL at 06:43

## 2021-01-01 RX ADMIN — CARBAMAZEPINE 200 MG: 200 TABLET ORAL at 10:18

## 2021-01-01 RX ADMIN — AMIODARONE HYDROCHLORIDE 200 MG: 200 TABLET ORAL at 23:30

## 2021-01-01 RX ADMIN — Medication 1 PATCH: at 04:23

## 2021-01-01 RX ADMIN — DOCUSATE SODIUM 50MG AND SENNOSIDES 8.6MG 2 TABLET: 8.6; 5 TABLET, FILM COATED ORAL at 03:11

## 2021-01-01 RX ADMIN — ACETAMINOPHEN 650 MG: 325 TABLET ORAL at 16:38

## 2021-01-01 RX ADMIN — BUDESONIDE AND FORMOTEROL FUMARATE DIHYDRATE 2 PUFF: 160; 4.5 AEROSOL RESPIRATORY (INHALATION) at 09:03

## 2021-01-01 RX ADMIN — IPRATROPIUM BROMIDE AND ALBUTEROL SULFATE 3 ML: .5; 3 SOLUTION RESPIRATORY (INHALATION) at 07:02

## 2021-09-10 PROBLEM — R41.82 ALTERED MENTAL STATUS: Status: ACTIVE | Noted: 2021-01-01

## 2021-09-10 NOTE — ED PROVIDER NOTES
"Subjective   81-year-old female presents to the ED for chief complaint of altered mental status.  Patient was brought in after being extremely confused and disoriented at home this morning.  She went to her primary care physician's office who instructed her to come to the ED.  Patient does not wear oxygen but had a pulse ox of 84% on room air.  She does have a history of COPD.  Family member indicates that she was treated for urinary tract infection recently.  Patient answer some questions but is otherwise pretty sleepy.  Further history review of systems limited secondary to patient's clinical status.          Review of Systems   Reason unable to perform ROS: Altered mental status.       Past Medical History:   Diagnosis Date   • Body piercing     EARS   • Constipation    • COPD (chronic obstructive pulmonary disease) (CMS/[facility])    • Cough     NOTED WHILE PATIENT WAS IN PREADMISSION TESTING. PATIENT REPORTS \"CLEAR PHLEM\" WITH NO FEVER AND STATED \"IT'S JUST A SMOKERS COUGH\"   • Full dentures     INSTRUCTED NO ADHESIVES THE DOS   • GERD (gastroesophageal reflux disease)     REPORTS ONLY INTERMITTENT EPISODES   • Hearing loss     PATIENT REPORTS AGE RELATED. NO USE OF HEARING AIDS.    • History of fracture     REPORTS HISTORY OF FRACTURED PELVIS.  REPORTS FALL EARLY JAN 2019 RESULTING IN FRACTURED LEFT SHOULDER.   • History of pneumonia     REPORTS MULTIPLE TIMES   • History of transfusion     REPORTS AS A TEEN AND THAT SHE DID NOT HAVE ANY REACTIONS TO TRANSFUSION   • Hypotension     REPORTS \"MY BLOOD PRESSURE IS ALWAYS LOW - I HAVE NEVER BEEN ABLE TO DONATE BLOOD\"   • Impaired mobility     RELATED TO MULTIPLE SCLEROSIS   • MS (multiple sclerosis) (CMS/[facility])    • Tattoo    • Wears glasses        No Known Allergies    Past Surgical History:   Procedure Laterality Date   • APPENDECTOMY      REPORTS SHE THINKS SHE HAD APPENDIX REMOVED WHEN GALLBLADDER WAS REMOVED.   • CHOLECYSTECTOMY     • CYBERKNIFE  03/12/2020    left " trigeminal nerve   • EYE SURGERY      Corneal implant, PATIENT REPORTS SHE IS UNSURE LATERALITY   • MOUTH SURGERY      FULL MOUTH EXTRACTION   • TOTAL SHOULDER ARTHROPLASTY Left 2/7/2019    Procedure: Left reverse total shoulder arthroplasty;  Surgeon: Eldon Valadez MD;  Location: [facility];  Service: Orthopedics       Family History   Family history unknown: Yes       Social History     Socioeconomic History   • Marital status:      Spouse name: Not on file   • Number of children: Not on file   • Years of education: Not on file   • Highest education level: Not on file   Tobacco Use   • Smoking status: Current Every Day Smoker     Packs/day: 0.50     Years: 58.00     Pack years: 29.00     Types: Cigarettes   • Smokeless tobacco: Never Used   • Tobacco comment: REPORTS SHE HAS SMOKED UP TO 1 PPD IN THE PAST   Substance and Sexual Activity   • Alcohol use: No   • Drug use: No   • Sexual activity: Defer           Objective   Physical Exam  Vitals and nursing note reviewed.   Constitutional:       General: She is not in acute distress.     Appearance: She is not diaphoretic.      Comments: Elderly-appearing.  Sleepy.   HENT:      Head: Normocephalic and atraumatic.      Nose: Nose normal.   Eyes:      Conjunctiva/sclera: Conjunctivae normal.   Cardiovascular:      Rate and Rhythm: Normal rate and regular rhythm.   Pulmonary:      Effort: Pulmonary effort is normal. No respiratory distress.      Breath sounds: Normal breath sounds.   Abdominal:      General: There is no distension.      Palpations: Abdomen is soft.      Tenderness: There is no abdominal tenderness. There is no guarding.   Musculoskeletal:         General: No deformity.   Neurological:      Mental Status: She is disoriented and confused.      Cranial Nerves: No cranial nerve deficit.      Comments: Sleepy         Electrical Cardioversion    Date/Time: 9/10/2021 10:51 PM  Performed by: Solis Jack MD  Authorized by: Solis Jack  MD Andreas     Consent:     Consent obtained:  Emergent situation  Pre-procedure details:     Cardioversion basis:  Emergent    Rhythm:  Ventricular tachycardia    Electrode placement:  Anterior-posterior  Attempt one:     Cardioversion mode:  Synchronous    Waveform:  Biphasic    Shock (Joules):  150    Shock outcome:  Conversion to normal sinus rhythm  Post-procedure details:     Patient status:  Awake    Patient tolerance of procedure:  Tolerated well, no immediate complications               ED Course  ED Course as of Sep 10 2248   Fri Sep 10, 2021   1720 EKG interpreted by me.  Sinus rhythm.  Sinus arrhythmia.  Rate of 66.  Diffuse ST segment abnormalities with T wave inversions.  Abnormal EKG.    [CG]   1721 Today's EKG is similar to EKG performed in 2019.    [CG]      ED Course User Index  [CG] Ricky Ambrocio, DO                                           MDM  Number of Diagnoses or Management Options  Altered mental status, unspecified altered mental status type  Bradycardia  Tachyarrhythmia  Urinary tract infection without hematuria, site unspecified  Diagnosis management comments: 21:07 EDT I was called to patient's room after she became acutely unresponsive.  When I arrived patient was gray and diaphoretic, her O2 saturation was in the 40s, she had a heart rate in the 30s to 40s.  She did have a pulse.  Patient is noted to be a DNR.  Patient's  is at bedside and request that we do everything we can to resuscitate her.  We administered oxygen via bag-valve-mask and a dose of atropine.  Her heart rate improved, it did appear that for a brief time she was in complete heart block.  She is now in a tachycardic rhythm that appears to be possibly atrial flutter.  We were going to intubate and patient had improvement in her mental status and started waking up and following commands so we will hold off on that for now especially given she is a DNR.  Will obtain head CT.  Disposition is likely to the  [facility].    22:48 EDT CT of the head is negative.  Patient's heart rate was steadily improving without any further intervention, had got down to the 110s.  Was then called into the room as patient became severely tachycardic.  Appear to be a wide-complex tachycardia.  Her blood pressure was in the 40s systolic and she was unresponsive so she was cardioverted, this was successful and she cardioverted into a sinus rhythm.  She was then started on amiodarone.  Some additional family has arrived and after discussion they do wish to uphold her desire to be DO NOT RESUSCITATE.  In the interim she is developed some chest pain with some ischemic changes on her EKG, they do not wish to involve cardiology.  They also are unsure if they want any other further aggressive interventions performed.  Discussed case with her primary doctor, Dr. Jackson, who is also covering hospitalist tonight and he is agreed to admit patient for further monitoring, likely comfort measures.     60 minutes of critical care provided. This time excludes other billable procedures. Time does include preparation of documents, medical consultations, review of old records, and direct bedside care. Patient was at high risk for life-threatening deterioration due to bradycardia, tachyarrhythmia, altered mental status.       Critical Care  Total time providing critical care: 30-74 minutes      Final diagnoses:   Altered mental status, unspecified altered mental status type   Urinary tract infection without hematuria, site unspecified   Bradycardia   Tachyarrhythmia          Solis Jack MD  09/10/21 [id number]

## 2021-09-11 PROBLEM — I47.1 SVT (SUPRAVENTRICULAR TACHYCARDIA) (HCC): Status: ACTIVE | Noted: 2021-01-01

## 2021-09-11 PROBLEM — R63.6 UNDERWEIGHT: Status: ACTIVE | Noted: 2021-01-01

## 2021-09-11 PROBLEM — J96.10 CHRONIC RESPIRATORY FAILURE (HCC): Status: ACTIVE | Noted: 2021-01-01

## 2021-09-11 NOTE — ED NOTES
Pts family removed 02 from Pt. When I went in to put back on family refused. Guero notified     Leonela Russ RN  09/10/21 0551

## 2021-09-11 NOTE — H&P
[facility]   HISTORY AND PHYSICAL      Name:  Maria Dolores Campa   Age:  81 y.o.  Sex:  female  :  1940  MRN:  [id number]   Visit Number:  [id number]  Admission Date:  9/10/2021  Date Of Service:  21  Primary Care Physician:  Gregorio Jackson MD    Chief Complaint:     Confusion and dysuria    History Of Presenting Illness:      This 1-year-old female was initially brought to my office today planes of confusion, nausea and dysuria which been present for a few days.  She appeared potentially toxic when seen in the office and was immediately directed to the emergency department.  She was found to have an O2 saturation of 84% on room air been recently treated for urinary tract infection.  A urinalysis in the emergency department revealed moderate 2+ leukocyte esterase.  She was given IV fluids and ceftriaxone in the emergency department was further observed.  She developed unresponsiveness and a heart rate of 190 requiring cardioversion Oertli she is in sinus rhythm rate 70 on an amiodarone drip.    Review Of Systems:    Additional review of systems obtained from family members at bedside had been some dysuria no fever complaints she has dribbling urinary incontinence which is chronic history of trigeminal neuralgia and left facial pain chronic functional impairment from multiple sclerosis there is a history of multiple falls there is a history of shortness of breath cough and continued cigarette smoking  Past Medical History: Patient  has a past medical history of Body piercing, Constipation, COPD (chronic obstructive pulmonary disease) (CMS/HCC), Cough, Full dentures, GERD (gastroesophageal reflux disease), Hearing loss, History of fracture, History of pneumonia, History of transfusion, Hypotension, Impaired mobility, MS (multiple sclerosis) (CMS/[facility]), Tattoo, and Wears glasses.  2017 she was admitted with a urinary tract infection, exacerbation of COPD, 2019 she  was hospitalized with her mental status frequent falls and a closed 4 part fracture of the left proximal humerus  Past Surgical History: Patient  has a past surgical history that includes Cholecystectomy; Eye surgery; Mouth surgery; Appendectomy; Total shoulder arthroplasty (Left, 2/7/2019); and Cyberknife (03/12/2020).    Social History: Patient  reports that she has been smoking cigarettes. She has a 29.00 pack-year smoking history. She has never used smokeless tobacco. She reports that she does not drink alcohol and does not use drugs.    Family History: Patient's Family history is unknown by patient.    Allergies:      Patient has no known allergies.    Home Medications:    Prior to Admission Medications     Prescriptions Last Dose Informant Patient Reported? Taking?    Acetaminophen-Caffeine (EXCEDRIN TENSION HEADACHE) 500-65 MG tablet  Self Yes No    Take 1 tablet by mouth As Needed (HEADACHE).    albuterol sulfate HFA (PROAIR HFA) 108 (90 Base) MCG/ACT inhaler   Yes No    ProAir HFA 90 mcg/actuation aerosol inhaler   Inhale 2 puffs every 4 hours by inhalation route.    budesonide (PULMICORT) 0.5 MG/2ML nebulizer solution   No No    Take 2 mL by nebulization 2 (Two) Times a Day.    carBAMazepine (TEGretol) 200 MG tablet  Self Yes No    Take 200 mg by mouth 2 (Two) Times a Day.    fluticasone-salmeterol (ADVAIR DISKUS) 250-50 MCG/DOSE DISKUS   Yes No    Advair Diskus 250 mcg-50 mcg/dose powder for inhalation    ipratropium-albuterol (DUO-NEB) 0.5-2.5 mg/3 ml nebulizer   No No    Take 3 mL by nebulization 4 (Four) Times a Day As Needed for Wheezing.    Multiple Vitamins-Minerals (MULTIVITAMIN ADULTS PO)  Self Yes No    Take 1 tablet by mouth Daily.    nicotine (NICODERM CQ) 21 MG/24HR patch  Self No No    Place 1 patch on the skin as directed by provider Daily.    PARoxetine (PAXIL) 20 MG tablet  Self Yes No    Take 20 mg by mouth Every Morning.    propranolol (INDERAL) 20 MG tablet   Yes No    Take 20 mg by  mouth 3 (Three) Times a Day.    sennosides-docusate sodium (SENOKOT-S) 8.6-50 MG tablet  Self Yes No    Take 2 tablets by mouth Every Night.        ED Medications:    Medications   sodium chloride 0.9 % flush 10 mL (has no administration in time range)   amiodarone in dextrose 5% (NEXTERONE) loading dose 150mg/100mL (0 mg Intravenous Stopped 9/10/21 2141)     Followed by   amiodarone 360 mg in 200 mL D5W infusion (1 mg/min Intravenous New Bag 9/10/21 2141)     Followed by   amiodarone 360 mg in 200 mL D5W infusion (has no administration in time range)   sodium chloride 0.9 % flush 10 mL (has no administration in time range)   sodium chloride 0.9 % flush 10 mL (has no administration in time range)   enoxaparin (LOVENOX) syringe 40 mg (has no administration in time range)   lactated ringers infusion (has no administration in time range)   carBAMazepine (TEGretol) tablet 200 mg (has no administration in time range)   budesonide-formoterol (SYMBICORT) 160-4.5 MCG/ACT inhaler 2 puff (has no administration in time range)   nicotine (NICODERM CQ) 21 MG/24HR patch 1 patch (has no administration in time range)   PARoxetine (PAXIL) tablet 20 mg (has no administration in time range)   sennosides-docusate (PERICOLACE) 8.6-50 MG per tablet 2 tablet (has no administration in time range)   cefTRIAXone (ROCEPHIN) IVPB 1 g/50ml dextrose (premix) (has no administration in time range)   cefTRIAXone (ROCEPHIN) IVPB 1 g/50ml dextrose (premix) (0 g Intravenous Stopped 9/10/21 2125)   sodium chloride 0.9 % bolus 1,000 mL (0 mL Intravenous Stopped 9/10/21 2154)   atropine injection (0.5 mg Intravenous Given 9/10/21 2042)   fentaNYL citrate (PF) (SUBLIMAZE) injection 50 mcg (50 mcg Intravenous Given 9/10/21 2231)     Vital Signs:  Temp:  [97.6 °F (36.4 °C)] 97.6 °F (36.4 °C)  Heart Rate:  [] 73  Resp:  [18] 18  BP: ()/(51-91) 110/74        09/10/21  1704   Weight: 45.4 kg (100 lb)     Body mass index is 17.16 kg/m².    Physical  Exam:     General Appearance:   Currently she is sleeping well but will startle on palpation of the abdomen   Head:  Atraumatic and normocephalic.   Eyes:    Ears:     Throat:    Neck: Supple, trachea midline, no thyromegaly.   Back:      Lungs:   Breath sounds heard bilaterally equally.  No crackles or wheezing. No Pleural rub or bronchial breathing.   Heart:  Normal S1 and S2, no murmur, no gallop, no rub. No JVD.   Abdomen:   Normal bowel sounds, no masses, no organomegaly. Soft, nontender, nondistended, no rebound tenderness.   Extremities: Supple, no edema, no cyanosis, no clubbing.   Pulses:  Foot pulses are diminished   Skin: No bleeding or rash.   Neurologic: No facial asymmetry. Moves all four limbs. No tremors.      Laboratory data:    I have reviewed the labs done in the emergency room.    Results from last 7 days   Lab Units 09/10/21  1910   SODIUM mmol/L 138   POTASSIUM mmol/L 5.1   CHLORIDE mmol/L 98   CO2 mmol/L 30.0*   BUN mg/dL 22   CREATININE mg/dL 0.75   CALCIUM mg/dL 10.3   BILIRUBIN mg/dL 0.3   ALK PHOS U/L 122*   ALT (SGPT) U/L 11   AST (SGOT) U/L 20   GLUCOSE mg/dL 101*     Results from last 7 days   Lab Units 09/10/21  1910   WBC 10*3/mm3 6.16   HEMOGLOBIN g/dL 15.4   HEMATOCRIT % 48.6*   PLATELETS 10*3/mm3 223         Results from last 7 days   Lab Units 09/10/21  1910   TROPONIN T ng/mL <0.010                     Results from last 7 days   Lab Units 09/10/21  1929   COLOR UA  Yellow   GLUCOSE UA  Negative   KETONES UA  Negative   LEUKOCYTES UA  Moderate (2+)*   PH, URINE  7.0   BILIRUBIN UA  Negative   UROBILINOGEN UA  0.2 E.U./dL   RBC UA /HPF None Seen   WBC UA /HPF 31-50*       Pain Management Panel     Pain Management Panel Latest Ref Rng & Units 1/30/2019    AMPHETAMINES SCREEN, URINE Negative Negative    BARBITURATES SCREEN Negative Negative    BENZODIAZEPINE SCREEN, URINE Negative Negative    BUPRENORPHINEUR Negative Negative    COCAINE SCREEN, URINE Negative Negative    METHADONE  SCREEN, URINE Negative Negative    METHAMPHETAMINEUR Negative Negative          EKG:      NSR at present    Radiology:    CT Head Without Contrast    Result Date: 9/10/2021  FINAL REPORT TECHNIQUE: Multiple axial CT images were performed from the foramen magnum to the vertex. This study was performed with techniques to keep radiation doses as low as reasonably achievable (ALARA). Individualized dose reduction techniques using automated exposure control or adjustment of mA and/or kV according to the patient's size were employed. CLINICAL HISTORY: Delirium FINDINGS: Exam is significantly limited due to patient motion.    There is mild generalized cerebral atrophy.  There is decreased attenuation in the white matter, consistent with mild small vessel chronic ischemic change.  The ventricles are enlarged. There is no evidence of edema or hemorrhage.  No masses are identified.  No extra-axial fluid is seen.  The paranasal sinuses are unremarkable.     Atrophy and chronic changes without acute process with limits of motion degraded images. Authenticated by Marcell Pina MD on 09/10/2021 10:06:14 PM      Assessment:    acute mental status changes related to urinary tract infection  Hemodynamically unstable supraventricular tachycardia    Plan:    Continue amiodarone drip and empiric antibiotic therapy for urinary tract infection    Conversation with the family and the patient is previously stated she does not want heroic resuscitative measures or mechanical intubation      Gregorio Jackson MD  09/11/21  01:22 EDT    Dictated utilizing Dragon dictation.

## 2021-09-11 NOTE — PLAN OF CARE
"Goal Outcome Evaluation:  Plan of Care Reviewed With: patient        Progress: no change  Outcome Summary: patient in bed resting. patient stated at one point \"when will you let me die\". comfort provided. vitals stable. switched from IV to oral management for HR. will continue to monitor.  "

## 2021-09-11 NOTE — THERAPY EVALUATION
Pt declined PT evaluation this pm stating she was tired and just did not feel good. Nursing made aware. Will plan to check back tomorrow and proceed with PT evaluation as pt is able to tolerate and as is medically appropriate.

## 2021-09-11 NOTE — ED NOTES
Per Loulou, House Supervisor the patient will be going to [facility] Terence Pedroza  09/11/21 0058

## 2021-09-11 NOTE — CONSULTS
"Adult Nutrition  Assessment/PES    Patient Name:  Maria Dolores Campa  YOB: 1940  MRN: [id number]  Admit Date:  9/10/2021    Assessment Date:  9/11/2021    Comments:    Recommend:  1. Continue current diet order as medically appropriate and tolerated.  2. Establish and encourage PO intake.  3. RD ordered Boost Plus BID and Boost Pudding daily.  4. Consider a multivitamin with minerals daily.  5. RD completed MSA with NFPE; pt meets criteria for moderate malnutrition with a BMI of 17.16.     RD to follow pt and available PRN.      Reason for Assessment     Row Name 09/11/21 0959          Reason for Assessment    Reason For Assessment  identified at risk by screening criteria;physician consult     Diagnosis  pulmonary disease;infection/sepsis;neurologic conditions UTI, AMS, COPD     Identified At Risk by Screening Criteria  BMI           Anthropometrics     Row Name 09/11/21 1000          Anthropometrics    Height  162.6 cm (64\")        Ideal Body Weight (IBW)    Ideal Body Weight (IBW) (kg)  55         Labs/Tests/Procedures/Meds     Row Name 09/11/21 1000          Labs/Procedures/Meds    Lab Results Reviewed  reviewed, pertinent     Lab Results Comments  High: Gluc        Medications    Pertinent Medications Reviewed  reviewed, pertinent     Pertinent Medications Comments  Pericolace         Physical Findings     Row Name 09/11/21 1000          Physical Findings    Overall Physical Appearance  underweight         Estimated/Assessed Needs     Row Name 09/11/21 1000          Calculation Measurements    Weight Used For Calculations  45.4 kg (100 lb 1.4 oz)     Height  162.6 cm (64\")        Estimated/Assessed Needs    Additional Documentation  Fluid Requirements (Group);Protein Requirements (Group);Calorie Requirements (Group);KCAL/KG (Group)        Calorie Requirements    Estimated Calorie Requirement Comment  1683-8619        KCAL/KG    KCAL/KG  30 Kcal/Kg (kcal);35 Kcal/Kg (kcal)     30 Kcal/Kg " (kcal)  1362     35 Kcal/Kg (kcal)  1589        Protein Requirements    Weight Used For Protein Calculations  45.4 kg (100 lb 1.4 oz)     Est Protein Requirement Amount (gms/kg)  1.5 gm protein 54-68 grams     Estimated Protein Requirements (gms/day)  68.1        Fluid Requirements    Fluid Requirements (mL/day)  -- 4978-9822 mL     Estimated Fluid Requirement Method  other (see comments) 1 mL/kcal         Nutrition Prescription Ordered     Row Name 09/11/21 1001          Nutrition Prescription PO    Current PO Diet  Regular         Evaluation of Received Nutrient/Fluid Intake     Row Name 09/11/21 1000          PO Evaluation    Number of Days PO Intake Evaluated  Insufficient Data           Malnutrition Severity Assessment     Row Name 09/11/21 1003          Malnutrition Severity Assessment    Malnutrition Type  Chronic Disease - Related Malnutrition        Muscle Loss    Loss of Muscle Mass Findings  Moderate     [place] Region  Moderate - slight depression     Clavicle Bone Region  Severe - protruding prominent bone     Acromion Bone Region  Severe - squared shoulders, bones, and acromion process protrusion prominent     Scapular Bone Region  Severe - prominent bones, depressions easily visible between ribs, scapula, spine, shoulders     Dorsal Hand Region  Severe - prominent depression     Patellar Region  Moderate - patella more prominent, less muscle definition around patella     Anterior Thigh Region  Moderate - mild depression on inner thigh     Posterior Calf Region  Moderate - some roundness, slight firmness        Fat Loss    Subcutaneous Fat Loss Findings  Moderate     Orbital Region   Moderate -  somewhat hollowness, slightly dark circles     Upper Arm Region  Severe - mostly skin, very little space between folds, fingers touch     Thoracic & Lumbar Region  Moderate - ribs visible with mild depressions, iliac crest somewhat prominent        Criteria Met (Must meet criteria for severity in at least 2 of  these categories: M Wasting, Fat Loss, Fluid, Secondary Signs, Wt. Status, Intake)    Patient meets criteria for   Moderate (non-severe) Malnutrition           Problem/Interventions:  Problem 1     Row Name 09/11/21 1001          Nutrition Diagnoses Problem 1    Problem 1  Malnutrition     Etiology (related to)  Medical Diagnosis     Pulmonary/Critical Care  COPD     Signs/Symptoms (evidenced by)  BMI     BMI  17 - 17.9         Problem 2     Row Name 09/11/21 1002          Nutrition Diagnoses Problem 2    Problem 2  Increased Nutrient Needs     Macronutrient  Protein     Etiology (related to)  Medical Diagnosis     Infectious Disease  UTI     Signs/Symptoms (evidenced by)  Report/Observation     Reported/Observed By  MD             Intervention Goal     Row Name 09/11/21 1002          Intervention Goal    General  Meet nutritional needs for age/condition;Improved nutrition related lab(s)     PO  Meet estimated needs;Establish PO     Weight  Appropriate weight gain         Nutrition Intervention     Row Name 09/11/21 1002          Nutrition Intervention    RD/Tech Action  Follow Tx progress;Recommend/ordered;Await begin PO     Recommended/Ordered  Supplement         Nutrition Prescription     Row Name 09/11/21 1002          Nutrition Prescription PO    PO Prescription  Begin/change supplement     Supplement  Boost Plus;Boost Pudding     Supplement Frequency  2 times a day;Daily     New PO Prescription Ordered?  Yes        Other Orders    Obtain Weight  Daily     Obtain Weight Ordered?  No, recommended     Supplement  Vitamin mineral supplement     Supplement Ordered?  No, recommended         Education/Evaluation     Row Name 09/11/21 1002          Education    Education  Education not appropriate at this time     Please explain  Patient confusion        Monitor/Evaluation    Monitor  Per protocol;I&O;PO intake;Supplement intake;Pertinent labs;Weight;Skin status           Electronically signed by:  Qiana Butler  RD  09/11/21 10:22 EDT

## 2021-09-11 NOTE — ED NOTES
Spoke with Loulou, House Supervisor at this time. She stated that the patient will have to stay in the ED until they can figure out bed placement.      Terence Powell  09/10/21 0440

## 2021-09-11 NOTE — PROGRESS NOTES
[facility]    Name:  Maria Dolores Campa   Age:  81 y.o.  Sex:  female  :  1940  MRN:  [id number]   Visit Number:  [id number]  Admission Date:  9/10/2021  Date Of Service:  21  Primary Care Physician:  Gregorio Jackson MD    Patient was seen and examined. Pertinent laboratory and radiology data were reviewed.    Vital signs:    Vital Signs (last 24 hours)       09/10 0700  -   0659  07  -   1031   Most Recent    Temp (°F) 96.2 -  97.6      98     98 (36.7)    Heart Rate 61 -  194    64 -  65     65    Resp 14 -  18      16     16    BP 68/51 -  162/97       116/69    SpO2 (%) 66 -  100       92          Patient was seen in follow up during day time shift.  Patient was hemodynamically stable with stable heart rate.  Amiodarone was reduced to 200mg PO BID (may plan for 2 weeks).  Patient was fatigued and wasn't interested in talking much.  Labs reviewed and were stable.  Continue IVF and rocephin for UTI.  Mental status may improve after she gets some rest.     Antonio Montana DO  21  10:31 EDT

## 2021-09-11 NOTE — ED NOTES
Dr. Jackson called per Dr. Jack with answer. Call transferred.      Terence Powell  09/10/21 0367

## 2021-09-11 NOTE — ED NOTES
Patient to stay in the ED until 0100 when another nurse will report to [facility]. LOUISE Gipson made aware.      Terence Powell  09/10/21 0142

## 2021-09-11 NOTE — PLAN OF CARE
Goal Outcome Evaluation:  Plan of Care Reviewed With: patient     Progress: no change   New admit.

## 2021-09-11 NOTE — ED NOTES
Pts family states they want only the Amiodarone, pain meds and IV Antibiotics only. They state that they want no other interventions done at this time     Leonela Russ RN  09/11/21 0032

## 2021-09-12 NOTE — THERAPY EVALUATION
"PT evaluation unable to be performed this date due to pt only responding \"no, no\" and requesting to be left alone and sleep. Therapist did try to explain importance of moving around but pt did not open eyes or respond. Nursing aware. Will plan to return tomorrow to attempt evaluation at that time.    "

## 2021-09-12 NOTE — CASE MANAGEMENT/SOCIAL WORK
Continued Stay Note   [facility]     Patient Name: Maria Dolores Campa  MRN: [id number]  Today's Date: 9/12/2021    Admit Date: 9/10/2021    Discharge Plan     Row Name 09/12/21 1632       Plan    Plan  Pt not wanting to speak to anyone and refusing much of her care   At 1635 I called Drew Campa, spouse and left message on answering machine  Called Peace Denise, daughter and also left message to call [phone] to help with DCP        Discharge Codes    No documentation.             Sania Meade RN

## 2021-09-12 NOTE — PROGRESS NOTES
"      [facility]    Name:  Maria Dolores Campa   Age:  81 y.o.  Sex:  female  :  1940  MRN:  [id number]   Visit Number:  [id number]  Admission Date:  9/10/2021  Date Of Service:  21  Primary Care Physician:  Gregorio Jackson MD     LOS: 2 days :  Patient Care Team:  Gregorio Jackson MD as PCP - General (Internal Medicine):    Chief Complaint:     AMS    Subjective / Interval History:   Patient was more awake and interactive with me on exam.  This morning she has been refusing much of the care and AM medications offered by the nurses.  She even mentioned that she was waiting to die.  I brought these concerns up to her during our encounter.  She seemed to make it sound as though she is just angry and frustrated with everything around her.  She shared she has a good and handsome  who she wished to be back to home for.  In the hospital, she \"just wants to stay in bed.\"    Review of Systems:   Review of Systems   Constitutional: Negative for chills, fatigue and fever.   HENT: Negative for congestion, ear pain, rhinorrhea, sinus pressure and sore throat.    Eyes: Negative for visual disturbance.   Respiratory: Negative for cough, chest tightness, shortness of breath and wheezing.    Cardiovascular: Negative for chest pain, palpitations and leg swelling.   Gastrointestinal: Negative for abdominal pain, blood in stool, constipation, diarrhea, nausea and vomiting.   Endocrine: Negative for polydipsia and polyuria.   Genitourinary: Negative for dysuria and hematuria.   Musculoskeletal: Negative for arthralgias and back pain.   Skin: Negative for rash.   Neurological: Negative for dizziness, light-headedness, numbness and headaches.   Psychiatric/Behavioral: Negative for dysphoric mood and sleep disturbance. The patient is not nervous/anxious.          Vital Signs:    Temp:  [97.6 °F (36.4 °C)-99.3 °F (37.4 °C)] 99.3 °F (37.4 °C)  Heart Rate:  [64-91] " 72  Resp:  [16-20] 16  BP: ()/(66-78) 133/77    Intake and output:    I/O last 3 completed shifts:  In: 3005 [P.O.:50; I.V.:2905; IV Piggyback:50]  Out: -   No intake/output data recorded.    Physical Examination:  Physical Exam  Vitals and nursing note reviewed.   Constitutional:       General: She is not in acute distress.     Appearance: Normal appearance. She is well-developed.      Comments: Well appearing elderly female,    HENT:      Head: Normocephalic and atraumatic.      Nose: Nose normal.      Mouth/Throat:      Mouth: Mucous membranes are moist.      Pharynx: No oropharyngeal exudate.   Eyes:      General: No scleral icterus.     Extraocular Movements: Extraocular movements intact.      Conjunctiva/sclera: Conjunctivae normal.      Pupils: Pupils are equal, round, and reactive to light.   Neck:      Thyroid: No thyromegaly.   Cardiovascular:      Rate and Rhythm: Normal rate and regular rhythm.      Heart sounds: Normal heart sounds. No murmur heard.   No friction rub. No gallop.    Pulmonary:      Effort: Pulmonary effort is normal. No respiratory distress.      Breath sounds: Normal breath sounds. No wheezing.   Abdominal:      General: Bowel sounds are normal. There is no distension.      Palpations: Abdomen is soft.      Tenderness: There is no abdominal tenderness.   Musculoskeletal:         General: No deformity or signs of injury.      Cervical back: Normal range of motion and neck supple.   Lymphadenopathy:      Cervical: No cervical adenopathy.   Skin:     General: Skin is warm and dry.      Findings: No rash.   Neurological:      General: No focal deficit present.      Mental Status: She is alert and oriented to person, place, and time.      Motor: Weakness (generalized) present.   Psychiatric:         Mood and Affect: Mood normal.         Behavior: Behavior normal.             Laboratory results:  I have reviewed the patient's laboratory results.    Results from last 7 days   Lab Units  09/12/21  0606 09/10/21  1910   SODIUM mmol/L 136 138   POTASSIUM mmol/L 4.1 5.1   CHLORIDE mmol/L 101 98   CO2 mmol/L 27.4 30.0*   BUN mg/dL 20 22   CREATININE mg/dL 0.67 0.75   CALCIUM mg/dL 8.9 10.3   BILIRUBIN mg/dL <0.2 0.3   ALK PHOS U/L 96 122*   ALT (SGPT) U/L 15 11   AST (SGOT) U/L 90* 20   GLUCOSE mg/dL 89 101*     Results from last 7 days   Lab Units 09/12/21  0606 09/10/21  1910   WBC 10*3/mm3 4.50 6.16   HEMOGLOBIN g/dL 12.1 15.4   HEMATOCRIT % 37.7 48.6*   PLATELETS 10*3/mm3 177 223         Results from last 7 days   Lab Units 09/10/21  1910   TROPONIN T ng/mL <0.010     Results from last 7 days   Lab Units 09/10/21  1929   URINECX  No growth       Radiology results:  I have reviewed the patient's radiology reports.  Imaging Results (Last 24 Hours)     ** No results found for the last 24 hours. **              Medication Review:   I have reviewed the patient's active and prn medications.     Assessment:    Urinary tract infection without hematuria    Acute exacerbation of chronic obstructive pulmonary disease (COPD) (CMS/[facility])    Multiple sclerosis (CMS/[facility])    Tobacco abuse    COPD (chronic obstructive pulmonary disease) (CMS/[facility])    Declining functional status    Trigeminal neuralgia of left side of face    Altered mental status    SVT (supraventricular tachycardia) (CMS/[facility])    Chronic respiratory failure (CMS/[facility])    Underweight        Plan:    UTI  - UA was negative, appears to have resolved    Altered mental status   - mental status appears to have resolved and improved.     Acute hypoxic respiratory failure  - was hypoxic on arrival but now carries good sats on room air.     Ventricular Tachycardia s/p Cardioversion  - amiodarone drip weaned off and patient started on oral amiodarone (but refused oral medications today).      After her recent events of sudden onset hypoxia and bradycardia followed by Vtach needing cardioversion, patient is weak and needs strengthening. She is currently refusing  all care for no specific reason.  I suggested she consider discussing goals of care with Dr. Jackson in the morning as he knows her better.  I attempted to contact the  but he did not respond to the phone call.     Antonio Montana DO  09/12/21  13:06 EDT

## 2021-09-13 NOTE — CASE MANAGEMENT/SOCIAL WORK
"Discharge Planning Assessment  [facility]     Patient Name: Maria Dolores Campa  MRN: [id number]  Today's Date: 9/13/2021    Admit Date: 9/10/2021    Discharge Needs Assessment     Row Name 09/13/21 1109       Living Environment    Lives With  spouse    Current Living Arrangements  home/apartment/condo    Primary Care Provided by  self    Provides Primary Care For  no one, unable/limited ability to care for self    Caregiving Concerns  lives with her  does assist with her care as much as she allows    Quality of Family Relationships  supportive    Able to Return to Prior Arrangements  other (see comments) Daughter is unsure       Discharge Needs Assessment    Concerns to be Addressed  discharge planning Daughter states pt really needs placement and her Dad is having difficulty taking care of pt but unsure if she will go to rehab    Anticipated Changes Related to Illness  inability to care for self    Equipment Needed After Discharge  none    Outpatient/Agency/Support Group Needs  -- Has had HH before but refuses them now    Provided Post Acute Provider List?  Yes    Post Acute Provider List  Nursing Home    Provided Post Acute Provider Quality & Resource List?  Yes    Post Acute Provider Quality and Resource List  Nursing Home    Delivered To  Support Person    Support Person  Keon pts daugher and     Method of Delivery  In person    Patient's Choice of Community Agency(s)  Dr states if she goes it will be [facility] they plan to have a family meeting.    Current Discharge Risk  chronically ill        Discharge Plan     Row Name 09/13/21 1138       Plan    Plan Received call from sera Feivánchristina who was returning a call to Case Management.  Daughter states pt lives with her  in a one story house.  No steps she has to climb.  Has a rollator walker, does not use home o2.  Cares for herself but on a day to day basis her dad does most of it.  Per Peace  \"she thinks she knows want is going on but " "she doesn't, she is extremely manipulative,  she is just Crazy.\" Daughter states she does need rehab/LTC but she will not be agreeable to go.  She has heard her dad say he didn't know if he could continue to care for her, but he has said that before.  Explained to daughter pt would have to agree to go to rehab, that the facilities would not accept her unless she agreed to go.  Daughter verbalized understanding and states if they chose a facility it would likely be [place] as is most convenient for her father.  12:15 EDT  Spoke to pt and daughter Peace in room and list of Rehab facilities given.  Daughter was questioning the goals and criteria for DC.  Explained to daughter would be how well she worked with PT and or how much help she would have at home.  Cm will continue to follow.              Continued Care and Services - Admitted Since 9/10/2021    Coordination has not been started for this encounter.         Demographic Summary     Row Name 09/13/21 1058       General Information    Admission Type  inpatient    Arrived From  emergency department    Required Notices Provided  Important Message from Medicare    Referral Source  admission list    Preferred Language  English     Used During This Interaction  no        Functional Status     Row Name 09/13/21 1101       Functional Status    Usual Activity Tolerance  fair    Current Activity Tolerance  fair    Functional Status Comments  Able to dress herself and care for most of personal       Functional Status, IADL    Medications  other (see comments) Daughter states she has been refusing to take her medicine    Meal Preparation  assistive person    Housekeeping  assistive person    Laundry  assistive person    Shopping  assistive person    IADL Comments  Assistance of one       Mental Status    General Appearance WDL  -- Daughter state pt basically comes and goes has had dementia for about 3 years.  Per Daughter Maria Dolores pt thinks she is in control and " makes all the decisions but she does not.       Mental Status Summary    Recent Changes in Mental Status/Cognitive Functioning  unable to assess    Mental Status Comments  Daughter states to the average person she may seem like she is oriented as she is able to answer some questions appropriately but she is not capable of making decisions,  Has been refusing her meds        Psychosocial    No documentation.       Abuse/Neglect    No documentation.       Legal    No documentation.       Substance Abuse    No documentation.       Patient Forms    No documentation.           Svitlana Bahena RN

## 2021-09-13 NOTE — PLAN OF CARE
Goal Outcome Evaluation:     Spoke with pt and her  at her bedside. I listened and provided support at pt talked about her condition and treatment she has received while in the hospital.  Pt stated that she was unaware of a heart condition that she is being treated for.  I offered prayer at the pt's request and will continue to follow.

## 2021-09-13 NOTE — PROGRESS NOTES
Pharmacy Consult-Vancomycin Dosing    Maria Dolores Campa is a  81 y.o. female receiving vancomycin therapy.     Indication: UTI  Consulting Provider: Dr. Robison    Goal AUC: 400-600 mg/L*hr    Current Antimicrobial Therapy  Anti-Infectives (From admission, onward)      Ordered     Dose/Rate Route Frequency Start Stop    09/13/21 1334  vancomycin 1000 mg in sodium chloride 0.9% 250 mL IVPB     Ordering Provider: Mirella Bowers DO    1,000 mg  over 60 Minutes Intravenous Every 24 Hours 09/13/21 1430 09/16/21 1429    09/13/21 1325  Pharmacy to dose vancomycin     Ordering Provider: Mirella Bowers DO     Does not apply Continuous PRN 09/13/21 1325 09/16/21 1324    09/11/21 0045  cefTRIAXone (ROCEPHIN) IVPB 1 g/50ml dextrose (premix)     Ordering Provider: Gregorio Jackson MD    1 g  100 mL/hr over 30 Minutes Intravenous Every 24 Hours 09/11/21 2000 09/12/21 2138    09/10/21 2005  cefTRIAXone (ROCEPHIN) IVPB 1 g/50ml dextrose (premix)     Ordering Provider: Solis Jack MD    1 g  100 mL/hr over 30 Minutes Intravenous Once 09/10/21 2007 09/10/21 2125            Labs  Results from last 7 days   Lab Units 09/12/21  0606 09/10/21  1910   WBC 10*3/mm3 4.50 6.16   CREATININE mg/dL 0.67 0.75      Estimated Creatinine Clearance: 39.5 mL/min (by C-G formula based on SCr of 0.67 mg/dL).  Temp Readings from Last 1 Encounters:   09/13/21 97.9 °F (36.6 °C) (Oral)       Microbiology Culture results  Microbiology Results (last 10 days)       Procedure Component Value - Date/Time    Urine Culture - Urine, Urine, Clean Catch [[id number]]  (Abnormal) Collected: 09/10/21 1929    Lab Status: Final result Specimen: Urine, Clean Catch Updated: 09/13/21 0803     Urine Culture 50,000 CFU/mL Aerococcus viridans     Comment: Aerococcus viridans is usually Susceptible to Trimethoprim sulfamethoxazole, Vancomycin, Gentamicin, and Tetracycline and Resistant to Penicillin.         COVID-19,[facility] IN-HOUSE,Nasal Swab No  "[employer] 3-4 HR TAT - Swab, Nasal Cavity [[id number]]  (Normal) Collected: 09/10/21 1919    Lab Status: Final result Specimen: Swab from Nasal Cavity Updated: 09/10/21 2003     COVID19 Not Detected    Narrative:      Fact sheet for providers: https://www.fda.gov/media/284582/download     Fact sheet for patients: https://www.fda.gov/media/101328/download    Test performed by PCR.    Consider negative results in combination with clinical observations, patient history, and epidemiological information.            Evaluation of Dosing     Last Dose Received in the ED/Outside Facility: No  Is Patient on Dialysis or Renal Replacement: No    Ht - 162.6 cm (64\")  Wt - 45.4 kg (100 lb 1.4 oz)      [employer] AUC Calculation    Predicted Steady State AUC on New Dose:   435 mg/L*hr    Assessment/Plan    Pharmacy to dose vancomycin for UTI. Goal -600 mg/L*hr.  Initiate maintenance dose of vancomycin 1000mg IV Q24hr on 9/13 @ 1430.  Pharmacy will continue to monitor renal function, cultures and sensitivities, and clinical status to adjust regimen as necessary.      Thank you,  Preeti Garcia, PharmD  09/13/21 13:37 EDT  "

## 2021-09-13 NOTE — PLAN OF CARE
Goal Outcome Evaluation:              Outcome Summary: Pt resting well this shift.  No acute events noted.  Up to bedside commode this shift for voiding with assist x 1.  Cooperative with staff this shift and all meds taken as ordered.  VSS.  Good UOP noted.  Continues to receive iv fluids as ordered.

## 2021-09-13 NOTE — THERAPY EVALUATION
"Patient Name: Maria Dolores Campa  : 1940    MRN: [id number]                              Today's Date: 2021       Admit Date: 9/10/2021    Visit Dx:     ICD-10-CM ICD-9-CM   1. Altered mental status, unspecified altered mental status type  R41.82 780.97   2. Urinary tract infection without hematuria, site unspecified  N39.0 599.0   3. Bradycardia  R00.1 427.89   4. 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 PATIENT REPORTS \"CLEAR PHLEM\" WITH NO FEVER AND STATED \"IT'S JUST A SMOKERS COUGH\"   • Full dentures     INSTRUCTED NO ADHESIVES THE DOS   • GERD (gastroesophageal reflux disease)     REPORTS ONLY INTERMITTENT EPISODES   • Hearing loss     PATIENT REPORTS AGE RELATED. NO USE OF HEARING AIDS.    • History of fracture     REPORTS HISTORY OF FRACTURED PELVIS.  REPORTS FALL EARLY 2019 RESULTING IN FRACTURED LEFT SHOULDER.   • History of pneumonia     REPORTS MULTIPLE TIMES   • History of transfusion     REPORTS AS A TEEN AND THAT SHE DID NOT HAVE ANY REACTIONS TO TRANSFUSION   • " "Hypotension     REPORTS \"MY BLOOD PRESSURE IS ALWAYS LOW - I HAVE NEVER BEEN ABLE TO DONATE BLOOD\"   • Impaired mobility     RELATED TO MULTIPLE SCLEROSIS   • MS (multiple sclerosis) (CMS/HCC)    • Tattoo    • Wears glasses      Past Surgical History:   Procedure Laterality Date   • APPENDECTOMY      REPORTS SHE THINKS SHE HAD APPENDIX REMOVED WHEN GALLBLADDER WAS REMOVED.   • CHOLECYSTECTOMY     • CYBERKNIFE  03/12/2020    left trigeminal nerve   • EYE SURGERY      Corneal implant, PATIENT REPORTS SHE IS UNSURE LATERALITY   • MOUTH SURGERY      FULL MOUTH EXTRACTION   • TOTAL SHOULDER ARTHROPLASTY Left 2/7/2019    Procedure: Left reverse total shoulder arthroplasty;  Surgeon: Eldon Valadez MD;  Location: [facility];  Service: Orthopedics     General Information     Row Name 09/13/21 1245          OT Time and Intention    Document Type  evaluation  -SD     Mode of Treatment  occupational therapy  -SD     Row Name 09/13/21 1245          General Information    Patient Profile Reviewed  yes  -SD     Prior Level of Function  independent:;all household mobility;ADL's Has a RW and SC  -SD     Existing Precautions/Restrictions  fall  -SD     Barriers to Rehab  medically complex;cognitive status;previous functional deficit  -SD     Row Name 09/13/21 1245          Living Environment    Lives With  spouse  -SD     Row Name 09/13/21 1245          Home Main Entrance    Number of Stairs, Main Entrance  two  -SD     Stair Railings, Main Entrance  railing on right side (ascending)  -SD     Row Name 09/13/21 1245          Stairs Within Home, Primary    Number of Stairs, Within Home, Primary  ten  -SD     Stair Railings, Within Home, Primary  railings on both sides of stairs  -SD     Stairs Comment, Within Home, Primary  bed and bath is on 2nd floor  -SD     Row Name 09/13/21 1245          Cognition    Orientation Status (Cognition)  oriented to;person;place  -SD     Row Name 09/13/21 1245          Safety Issues, Functional " Mobility    Safety Issues Affecting Function (Mobility)  safety precautions follow-through/compliance;safety precaution awareness;awareness of need for assistance  -SD     Impairments Affecting Function (Mobility)  balance;cognition;endurance/activity tolerance;strength  -SD       User Key  (r) = Recorded By, (t) = Taken By, (c) = Cosigned By    Initials Name Provider Type    SD Nitza Alexander OT Occupational Therapist          Mobility/ADL's     Row Name 09/13/21 1246          Bed Mobility    Bed Mobility  supine-sit;sit-supine  -SD     Supine-Sit [place] (Bed Mobility)  minimum assist (75% patient effort)  -SD     Sit-Supine [place] (Bed Mobility)  minimum assist (75% patient effort)  -SD     Assistive Device (Bed Mobility)  bed rails;head of bed elevated  -[facility] Name 09/13/21 1246          Transfers    Transfers  sit-stand transfer  -SD     Sit-Stand [place] (Transfers)  minimum assist (75% patient effort)  -[facility] Name 09/13/21 1246          Sit-Stand Transfer    Assistive Device (Sit-Stand Transfers)  walker, front-wheeled  -SD     Row Name 09/13/21 1246          Functional Mobility    Functional Mobility- Ind. Level  minimum assist (75% patient effort)  -SD     Functional Mobility- Device  rolling walker  -SD     Functional Mobility-Distance (Feet)  8  -SD     Functional Mobility- Safety Issues  balance decreased during turns;sequencing ability decreased;step length decreased;weight-shifting ability decreased  -SD     Functional Mobility- Comment  patient c/o BLE feeling weak as well as dizziness requiring patient to sit on end of bed. Patient declined further mobility and requests return to supine  -[facility] Name 09/13/21 1246          Activities of Daily Living    BADL Assessment/Intervention  bathing;upper body dressing;lower body dressing;grooming;feeding;toileting  -SD     Row Name 09/13/21 1246          Bathing Assessment/Intervention    [place] Level (Bathing)  moderate  assist (50% patient effort)  -SD     Row Name 09/13/21 1246          Upper Body Dressing Assessment/Training    [place] Level (Upper Body Dressing)  standby assist  -SD     Row Name 09/13/21 1246          Lower Body Dressing Assessment/Training    [place] Level (Lower Body Dressing)  minimum assist (75% patient effort)  -SD     Row Name 09/13/21 1246          Grooming Assessment/Training    [place] Level (Grooming)  minimum assist (75% patient effort)  -SD     Row Name 09/13/21 1246          Self-Feeding Assessment/Training    [place] Level (Feeding)  minimum assist (75% patient effort)  -SD     Row Name 09/13/21 1246          Toileting Assessment/Training    [place] Level (Toileting)  moderate assist (50% patient effort)  -SD       User Key  (r) = Recorded By, (t) = Taken By, (c) = Cosigned By    Initials Name Provider Type    Nitza Richmond OT Occupational Therapist        Obj/Interventions     Row Name 09/13/21 1250          Range of Motion Comprehensive    General Range of Motion  bilateral upper extremity ROM WFL  -SD     Row Name 09/13/21 1250          Strength Comprehensive (MMT)    General Manual Muscle Testing (MMT) Assessment  upper extremity strength deficits identified  -SD     Comment, General Manual Muscle Testing (MMT) Assessment  UB 3+/5  -SD     Row Name 09/13/21 1250          Motor Skills    Motor Skills  coordination  -SD     Coordination  fine motor deficit;bilateral;minimal impairment  -SD       User Key  (r) = Recorded By, (t) = Taken By, (c) = Cosigned By    Initials Name Provider Type    Nitza Richmond OT Occupational Therapist        Goals/Plan     Row Name 09/13/21 1255          Transfer Goal 1 (OT)    Activity/Assistive Device (Transfer Goal 1, OT)  sit-to-stand/stand-to-sit;walker, rolling  -SD     [place] Level/Cues Needed (Transfer Goal 1, OT)  contact guard assist  -SD     Time Frame (Transfer Goal 1, OT)  long term goal (LTG)  -SD      Progress/Outcome (Transfer Goal 1, OT)  goal ongoing  -SD     [facility] Name 09/13/21 1255          Dressing Goal 1 (OT)    Activity/Device (Dressing Goal 1, OT)  lower body dressing  -SD     [place]/Cues Needed (Dressing Goal 1, OT)  contact guard assist  -SD     Time Frame (Dressing Goal 1, OT)  2 weeks  -SD     Progress/Outcome (Dressing Goal 1, OT)  goal ongoing  -SD     Row Name 09/13/21 1255          Toileting Goal 1 (OT)    Activity/Device (Toileting Goal 1, OT)  toileting skills, all;commode  -SD     [place] Level/Cues Needed (Toileting Goal 1, OT)  contact guard assist  -SD     Time Frame (Toileting Goal 1, OT)  2 weeks  -SD     Progress/Outcome (Toileting Goal 1, OT)  goal ongoing  -SD     Row Name 09/13/21 1255          Strength Goal 1 (OT)    Strength Goal 1 (OT)  Patient to perform UB ther ex as tolerated  -SD     Time Frame (Strength Goal 1, OT)  long term goal (LTG)  -SD     Progress/Outcome (Strength Goal 1, OT)  goal ongoing  -SD     Row Name 09/13/21 1255          Therapy Assessment/Plan (OT)    Planned Therapy Interventions (OT)  activity tolerance training;adaptive equipment training;BADL retraining;patient/caregiver education/training;ROM/therapeutic exercise;strengthening exercise;transfer/mobility retraining  -SD       User Key  (r) = Recorded By, (t) = Taken By, (c) = Cosigned By    Initials Name Provider Type    Nitza Richmond OT Occupational Therapist        Clinical Impression     Row Name 09/13/21 1250          Pain Assessment    Additional Documentation  Pain Scale: Numbers Pre/Post-Treatment (Group)  -SD     Row Name 09/13/21 1250          Pain Scale: Numbers Pre/Post-Treatment    Pretreatment Pain Rating  0/10 - no pain  -SD     Posttreatment Pain Rating  0/10 - no pain  -SD     Row Name 09/13/21 1250          Plan of Care Review    Plan of Care Reviewed With  patient  -SD     Progress  no change  -SD     Outcome Summary  OT eval completed. Patient presents deficits in  strength, endurance, balance, mobility, coordination and ADL performance. Patient completed bed mobility, transfer and functional mobility with min A, distance was limited by dizziness and legs feeling weak. Patient requires increased assistance with ADLs; noted to have UE tremor when completing FMC tasks. Patient is expected to benefit from continued OT services prior to OR and would benefit from HH services at OR.  -SD     Row Name 09/13/21 1250          Therapy Assessment/Plan (OT)    Rehab Potential (OT)  good, to achieve stated therapy goals  -SD     Criteria for Skilled Therapeutic Interventions Met (OT)  skilled treatment is necessary  -SD     Therapy Frequency (OT)  3 times/wk 5 times if indicated  -SD     Row Name 09/13/21 1250          Therapy Plan Review/Discharge Plan (OT)    Anticipated Discharge Disposition (OT)  home with assist;home with home health  -SD     Row Name 09/13/21 1250          Positioning and Restraints    Pre-Treatment Position  sitting in chair/recliner  -SD     Post Treatment Position  bed  -SD     In Bed  fowlers;call light within reach;encouraged to call for assist;exit alarm on  -SD       User Key  (r) = Recorded By, (t) = Taken By, (c) = Cosigned By    Initials Name Provider Type    Nitza Richmond OT Occupational Therapist        Outcome Measures     Row Name 09/13/21 1256          How much help from another is currently needed...    Putting on and taking off regular lower body clothing?  3  -SD     Bathing (including washing, rinsing, and drying)  2  -SD     Toileting (which includes using toilet bed pan or urinal)  2  -SD     Putting on and taking off regular upper body clothing  3  -SD     Taking care of personal grooming (such as brushing teeth)  3  -SD     Eating meals  3  -SD     AM-PAC 6 Clicks Score (OT)  16  -SD     Row Name 09/13/21 1256          Functional Assessment    Outcome Measure Options  AM-PAC 6 Clicks Daily Activity (OT)  -SD       User Key  (r) =  Recorded By, (t) = Taken By, (c) = Cosigned By    Initials Name Provider Type    SD Nitza Alexander OT Occupational Therapist          Occupational Therapy Education                 Title: PT OT SLP Therapies (In Progress)     Topic: Occupational Therapy (In Progress)     Point: ADL training (Done)     Description:   Instruct learner(s) on proper safety adaptation and remediation techniques during self care or transfers.   Instruct in proper use of assistive devices.              Learning Progress Summary           Patient Acceptance, E,TB, VU by SD at 9/13/2021 5136    Comment: Benefit of OT; OT POC                   Point: Home exercise program (Not Started)     Description:   Instruct learner(s) on appropriate technique for monitoring, assisting and/or progressing therapeutic exercises/activities.              Learner Progress:  Not documented in this visit.          Point: Precautions (Not Started)     Description:   Instruct learner(s) on prescribed precautions during self-care and functional transfers.              Learner Progress:  Not documented in this visit.          Point: Body mechanics (Not Started)     Description:   Instruct learner(s) on proper positioning and spine alignment during self-care, functional mobility activities and/or exercises.              Learner Progress:  Not documented in this visit.                      User Key     Initials Effective Dates Name Provider Type Discipline    SD 06/16/21 -  Nitza Alexander OT Occupational Therapist OT              OT Recommendation and Plan  Planned Therapy Interventions (OT): activity tolerance training, adaptive equipment training, BADL retraining, patient/caregiver education/training, ROM/therapeutic exercise, strengthening exercise, transfer/mobility retraining  Therapy Frequency (OT): 3 times/wk (5 times if indicated)  Plan of Care Review  Plan of Care Reviewed With: patient  Progress: no change  Outcome Summary: OT eval completed. Patient  presents deficits in strength, endurance, balance, mobility, coordination and ADL performance. Patient completed bed mobility, transfer and functional mobility with min A, distance was limited by dizziness and legs feeling weak. Patient requires increased assistance with ADLs; noted to have UE tremor when completing FMC tasks. Patient is expected to benefit from continued OT services prior to DC and would benefit from HH services at DC.     Time Calculation:   Time Calculation- OT     Row Name 09/13/21 1259             Time Calculation- OT    OT Start Time  0945  -SD      OT Received On  09/13/21  -SD      OT Goal Re-Cert Due Date  09/23/21  -SD         Untimed Charges    OT Eval/Re-eval Minutes  45  -SD         Total Minutes    Untimed Charges Total Minutes  45  -SD       Total Minutes  45  -SD        User Key  (r) = Recorded By, (t) = Taken By, (c) = Cosigned By    Initials Name Provider Type    Nitza Richmond OT Occupational Therapist        Therapy Charges for Today     Code Description Service Date Service Provider Modifiers Qty    [id number] HC OT EVAL LOW COMPLEXITY 3 9/13/2021 Nitza Alexander OT GO 1               Nitza Alexander OT  9/13/2021

## 2021-09-13 NOTE — PLAN OF CARE
Goal Outcome Evaluation:  Plan of Care Reviewed With: (P) patient        Progress: (P) no change  Outcome Summary: (P) PT castillo completed this this date. Patient was min asssit for all bed mobility, transfers, and 8ft ambulation RW. Patient present with deficits in stregnth, endrance, and functional mobility. Patiente stated she had been walking around okay before coming to the hospital but with PT was only able to walk 8ft before needing to take a seat due to weakness, and dizziness. Patient would benefit from skilled PT interventions during inpatient stay to address deficits.

## 2021-09-13 NOTE — THERAPY EVALUATION
"Patient Name: Maria Dolores Campa  : 1940    MRN: [id number]                              Today's Date: 2021       Admit Date: 9/10/2021    Visit Dx:     ICD-10-CM ICD-9-CM   1. Altered mental status, unspecified altered mental status type  R41.82 780.97   2. Urinary tract infection without hematuria, site unspecified  N39.0 599.0   3. Bradycardia  R00.1 427.89   4. 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 PATIENT REPORTS \"CLEAR PHLEM\" WITH NO FEVER AND STATED \"IT'S JUST A SMOKERS COUGH\"   • Full dentures     INSTRUCTED NO ADHESIVES THE DOS   • GERD (gastroesophageal reflux disease)     REPORTS ONLY INTERMITTENT EPISODES   • Hearing loss     PATIENT REPORTS AGE RELATED. NO USE OF HEARING AIDS.    • History of fracture     REPORTS HISTORY OF FRACTURED PELVIS.  REPORTS FALL EARLY 2019 RESULTING IN FRACTURED LEFT SHOULDER.   • History of pneumonia     REPORTS MULTIPLE TIMES   • History of transfusion     REPORTS AS A TEEN AND THAT SHE DID NOT HAVE ANY REACTIONS TO TRANSFUSION   • " "Hypotension     REPORTS \"MY BLOOD PRESSURE IS ALWAYS LOW - I HAVE NEVER BEEN ABLE TO DONATE BLOOD\"   • Impaired functional mobility, balance, gait, and endurance 09/13/2021   • Impaired mobility     RELATED TO MULTIPLE SCLEROSIS   • MS (multiple sclerosis) (CMS/HCC)    • Tattoo    • Wears glasses      Past Surgical History:   Procedure Laterality Date   • APPENDECTOMY      REPORTS SHE THINKS SHE HAD APPENDIX REMOVED WHEN GALLBLADDER WAS REMOVED.   • CHOLECYSTECTOMY     • CYBERKNIFE  03/12/2020    left trigeminal nerve   • EYE SURGERY      Corneal implant, PATIENT REPORTS SHE IS UNSURE LATERALITY   • MOUTH SURGERY      FULL MOUTH EXTRACTION   • TOTAL SHOULDER ARTHROPLASTY Left 2/7/2019    Procedure: Left reverse total shoulder arthroplasty;  Surgeon: Eldon Valadez MD;  Location: [facility];  Service: Orthopedics     General Information     Row Name 09/13/21 1327          Physical Therapy Time and Intention    Document Type  evaluation  (Pended)   -CW     Mode of Treatment  physical therapy  (Pended)   -CW     Row Name 09/13/21 1327          General Information    Patient Profile Reviewed  yes  (Pended)   -CW     Prior Level of Function  independent:;all household mobility;community mobility  (Pended)   -CW     Existing Precautions/Restrictions  fall  (Pended)   -CW     Barriers to Rehab  medically complex;previous functional deficit  (Pended)   -CW     Row Name 09/13/21 1327          Living Environment    Lives With  spouse  (Pended)   -CW     Row Name 09/13/21 1327          Home Main Entrance    Number of Stairs, Main Entrance  two  (Pended)   -CW     Stair Railings, Main Entrance  railing on left side (ascending)  (Pended)   -CW     Row Name 09/13/21 1327          Stairs Within Home, Primary    Number of Stairs, Within Home, Primary  ten  (Pended)   -CW     Stairs Comment, Within Home, Primary  Bedroom and bathroom on the second floor.  (Pended)   -CW     Row Name 09/13/21 1327          Cognition    " Orientation Status (Cognition)  oriented to;person;place;verbal cues/prompts needed for orientation  (Pended)   -     Row Name 09/13/21 1327          Safety Issues, Functional Mobility    Safety Issues Affecting Function (Mobility)  ability to follow commands;awareness of need for assistance;safety precautions follow-through/compliance;sequencing abilities  (Pended)   -CW     Impairments Affecting Function (Mobility)  balance;cognition;endurance/activity tolerance;strength  (Pended)   -CW     Cognitive Impairments, Mobility Safety/Performance  safety precaution awareness;safety precaution follow-through  (Pended)   -       User Key  (r) = Recorded By, (t) = Taken By, (c) = Cosigned By    Initials Name Provider Type    CW Diane Sr, PT Student PT Student        Mobility     Row Name 09/13/21 0956          Bed Mobility    Bed Mobility  supine-sit;sit-supine  (Pended)   -     Supine-Sit Painter (Bed Mobility)  minimum assist (75% patient effort)  (Pended)   -     Sit-Supine Painter (Bed Mobility)  minimum assist (75% patient effort)  (Pended)   -     Assistive Device (Bed Mobility)  bed rails;head of bed elevated  (Pended)   -     Row Name 09/13/21 0944          Sit-Stand Transfer    Sit-Stand Painter (Transfers)  minimum assist (75% patient effort)  (Pended)   -     Assistive Device (Sit-Stand Transfers)  walker, front-wheeled  (Pended)   -     Row Name 09/13/21 0944          Gait/Stairs (Locomotion)    Painter Level (Gait)  minimum assist (75% patient effort);verbal cues  (Pended)   -     Assistive Device (Gait)  walker, front-wheeled  (Pended)   -     Distance in Feet (Gait)  8ft  (Pended)   -CW     Deviations/Abnormal Patterns (Gait)  base of support, narrow;senthil decreased;stride length decreased  (Pended)   -CW     Bilateral Gait Deviations  heel strike decreased  (Pended)   -CW     Comment (Gait/Stairs)  Patient went from the bedside chair toward the bed and stated  she had to sit down.  (Pended)   -       User Key  (r) = Recorded By, (t) = Taken By, (c) = Cosigned By    Initials Name Provider Type    CW Diane Sr, PT Student PT Student        Obj/Interventions     Row Name 09/13/21 1336          Range of Motion Comprehensive    General Range of Motion  bilateral upper extremity ROM WFL  (Pended)   -CW     Row Name 09/13/21 1336          Strength Comprehensive (MMT)    General Manual Muscle Testing (MMT) Assessment  lower extremity strength deficits identified  (Pended)   -     Comment, General Manual Muscle Testing (MMT) Assessment  LE MMT 3-/5  (Pended)   -CW     Row Name 09/13/21 1336          Balance    Balance Assessment  sitting static balance;sitting dynamic balance;standing static balance;standing dynamic balance  (Pended)   -     Static Sitting Balance  WFL  (Pended)   -CW     Dynamic Sitting Balance  WFL  (Pended)   -CW     Static Standing Balance  WFL  (Pended)   -CW     Dynamic Standing Balance  WFL  (Pended)   -       User Key  (r) = Recorded By, (t) = Taken By, (c) = Cosigned By    Initials Name Provider Type    CW Diane Sr, PT Student PT Student        Goals/Plan     Row Name 09/13/21 0957          Bed Mobility Goal 1 (PT)    Activity/Assistive Device (Bed Mobility Goal 1, PT)  bed mobility activities, all  (Pended)   -CW     [place] Level/Cues Needed (Bed Mobility Goal 1, PT)  modified independence  (Pended)   -CW     Time Frame (Bed Mobility Goal 1, PT)  long term goal (LTG);10 days  (Pended)   -CW     Progress/Outcomes (Bed Mobility Goal 1, PT)  goal ongoing  (Pended)   -CW     Row Name 09/13/21 0916          Transfer Goal 1 (PT)    Activity/Assistive Device (Transfer Goal 1, PT)  transfers, all  (Pended)   -CW     [place] Level/Cues Needed (Transfer Goal 1, PT)  modified independence  (Pended)   -CW     Time Frame (Transfer Goal 1, PT)  long term goal (LTG);10 days  (Pended)   -CW     Progress/Outcome (Transfer Goal 1, PT)  goal  ongoing  (Pended)   -CW     Row Name 09/13/21 0944          Gait Training Goal 1 (PT)    Activity/Assistive Device (Gait Training Goal 1, PT)  gait (walking locomotion);decrease fall risk;increase endurance/gait distance  (Pended)   -CW     [place] Level (Gait Training Goal 1, PT)  standby assist  (Pended)   -CW     Distance (Gait Training Goal 1, PT)  100ft  (Pended)   -CW     Time Frame (Gait Training Goal 1, PT)  long term goal (LTG);10 days  (Pended)   -CW     Progress/Outcome (Gait Training Goal 1, PT)  goal ongoing  (Pended)   -CW     Row Name 09/13/21 0944          Stairs Goal 1 (PT)    Activity/Assistive Device (Stairs Goal 1, PT)  stairs, all skills  (Pended)   -CW     [place] Level/Cues Needed (Stairs Goal 1, PT)  modified independence  (Pended)   -CW     Number of Stairs (Stairs Goal 1, PT)  10  (Pended)   -CW     Time Frame (Stairs Goal 1, PT)  long term goal (LTG);10 days  (Pended)   -CW     Progress/Outcome (Stairs Goal 1, PT)  goal ongoing  (Pended)   -CW       User Key  (r) = Recorded By, (t) = Taken By, (c) = Cosigned By    Initials Name Provider Type    CW Diane Sr, PT Student PT Student        Clinical Impression     [facility] Name 09/13/21 3143          Pain Scale: Numbers Pre/Post-Treatment    Pretreatment Pain Rating  0/10 - no pain  (Pended)   -CW     Posttreatment Pain Rating  0/10 - no pain  (Pended)   -CW     Row Name 09/13/21 1215          Plan of Care Review    Plan of Care Reviewed With  patient  (Pended)   -CW     Progress  no change  (Pended)   -CW     Outcome Summary  PT castillo completed this this date. Patient was min asssit for all bed mobility, transfers, and 8ft ambulation RW. Patient present with deficits in stregnth, endrance, and functional mobility. Maxim stated she had been walking around okay before coming to the hospital but with PT was only able to walk 8ft before needing to take a seat due to weakness, and dizziness. Patient would benefit from skilled PT  interventions during inpatient stay to address deficits.  (Pended)   -CW     Row Name 09/13/21 1338          Therapy Assessment/Plan (PT)    Patient/Family Therapy Goals Statement (PT)  to go home  (Pended)   -CW     Rehab Potential (PT)  good, to achieve stated therapy goals  (Pended)   -CW     Criteria for Skilled Interventions Met (PT)  yes;meets criteria;skilled treatment is necessary  (Pended)   -CW     Predicted Duration of Therapy Intervention (PT)  Until discharge  (Pended)   -CW     Row Name 09/13/21 1338          Vital Signs    O2 Delivery Pre Treatment  room air  (Pended)   -CW     O2 Delivery Intra Treatment  room air  (Pended)   -CW     O2 Delivery Post Treatment  room air  (Pended)   -CW     Pre Patient Position  Sitting  (Pended)   -CW     Intra Patient Position  Standing  (Pended)   -CW     Post Patient Position  Supine  (Pended)   -CW     Row Name 09/13/21 1452          Positioning and Restraints    Pre-Treatment Position  sitting in chair/recliner  (Pended)   -CW     Post Treatment Position  bed  (Pended)   -CW     In Bed  fowlers;call light within reach;encouraged to call for assist;exit alarm on  (Pended)   -CW       User Key  (r) = Recorded By, (t) = Taken By, (c) = Cosigned By    Initials Name Provider Type    CW Diane Sr, PT Student PT Student        Outcome Measures     Row Name 09/13/21 7665          How much help from another person do you currently need...    Turning from your back to your side while in flat bed without using bedrails?  4  (Pended)   -CW     Moving from lying on back to sitting on the side of a flat bed without bedrails?  4  (Pended)   -CW     Moving to and from a bed to a chair (including a wheelchair)?  3  (Pended)   -CW     Standing up from a chair using your arms (e.g., wheelchair, bedside chair)?  3  (Pended)   -CW     Climbing 3-5 steps with a railing?  2  (Pended)   -CW     To walk in hospital room?  3  (Pended)   -CW     AM-PAC 6 Clicks Score (PT)  19   (Pended)   -     Row Name 09/13/21 1256 09/13/21 0944       Functional Assessment    Outcome Measure Options  AM-PAC 6 Clicks Daily Activity (OT)  -SD  AM-PAC 6 Clicks Basic Mobility (PT)  (Pended)   -      User Key  (r) = Recorded By, (t) = Taken By, (c) = Cosigned By    Initials Name Provider Type    SD Nitza Alexander, OT Occupational Therapist    CW Diane Sr, PT Student PT Student                       Physical Therapy Education                 Title: PT OT SLP Therapies (In Progress)     Topic: Physical Therapy (Done)     Point: Mobility training (Done)     Learning Progress Summary           Patient Acceptance, E, VU by  at 9/13/2021 1345    Comment: Education provided on the benefit of PT POC.                               User Key     Initials Effective Dates Name Provider Type Discipline     07/26/21 -  Diane Sr, PT Student PT Student PT              PT Recommendation and Plan     Plan of Care Reviewed With: (P) patient  Progress: (P) no change  Outcome Summary: (P) PT eval completed this this date. Patient was min asssit for all bed mobility, transfers, and 8ft ambulation RW. Patient present with deficits in stregnth, endrance, and functional mobility. Patiente stated she had been walking around okay before coming to the hospital but with PT was only able to walk 8ft before needing to take a seat due to weakness, and dizziness. Patient would benefit from skilled PT interventions during inpatient stay to address deficits.     Time Calculation:   PT Charges     Row Name 09/13/21 1346             Time Calculation    Start Time  0944  (Pended)   -CW      PT Received On  09/13/21  (Pended)   -      PT Goal Re-Cert Due Date  09/23/21  (Pended)   -CW         Untimed Charges    PT Eval/Re-eval Minutes  45  (Pended)   -CW         Total Minutes    Untimed Charges Total Minutes  45  (Pended)   -CW       Total Minutes  45  (Pended)   -        User Key  (r) = Recorded By, (t) = Taken By, (c) =  Cosigned By    Initials Name Provider Type    CW Diane Sr, PT Student PT Student        Therapy Charges for Today     Code Description Service Date Service Provider Modifiers Qty    [id number] HC PT EVAL LOW COMPLEXITY 3 9/13/2021 Diane Sr, PT Student GP 1          PT G-Codes  Outcome Measure Options: AM-PAC 6 Clicks Daily Activity (OT)  AM-PAC 6 Clicks Score (PT): (P) 19  AM-PAC 6 Clicks Score (OT): 16    DIANE SR PT Student  9/13/2021

## 2021-09-13 NOTE — PROGRESS NOTES
"    [facility]    Name:  Maria Dolores Campa   Age:  81 y.o.  Sex:  female  :  1940  MRN:  [id number]   Visit Number:  [id number]  Admission Date:  9/10/2021  Date Of Service:  21  Primary Care Physician:  Gregorio Jackson MD     LOS: 3 days :    Chief Complaint:      Altered Mental Status    Subjective:    Patient seen and examined at bedside this morning.  Chart reviewed and events noted.  Patient stated that she is doing \"just great.\"  She repeatedly asks why she is in the hospital and after discussion and questions \"I have a heart condition?\"  Her daughter is at bedside and redirect the patient repeatedly.  Patient is pleasant and agreeable to work with physical therapy.    Daughter asks to speak privately in the hallway, where she addressed concerns regarding the patient's safety at home where the patient's  is her primary caretaker.      Review of Systems:     All systems were reviewed and negative except as mentioned in subjective, assessment and plan.    Vital Signs:    Temp:  [97.8 °F (36.6 °C)-98.4 °F (36.9 °C)] 98 °F (36.7 °C)  Heart Rate:  [71-89] 88  Resp:  [18-20] 18  BP: (135-151)/(72-87) 135/72    Intake and output:    I/O last 3 completed shifts:  In: 2662 [P.O.:50; I.V.:2562; IV Piggyback:50]  Out: -   I/O this shift:  In: 1200 [P.O.:1200]  Out: -     Physical Examination:    General Appearance:  Alert and cooperative. In NAD.   Head:  Atraumatic and normocephalic.   Eyes: Conjunctivae and sclerae normal, no icterus. No pallor.   Throat: No oral lesions, no thrush, oral mucosa moist.   Neck: Supple, trachea midline, no thyromegaly.   Lungs:   Breath sounds heard bilaterally equally.  No crackles or wheezing. No Pleural rub or bronchial breathing.   Heart:  Normal S1 and S2, no murmur, no gallop, no rub. No JVD.   Abdomen:   Normal bowel sounds, no masses, no organomegaly. Soft, nontender, nondistended, no rebound tenderness. "   Extremities: Supple, no edema, no cyanosis, no clubbing.   Skin: No bleeding or rash.   Neurologic: Alert and oriented x 3. No facial asymmetry. Moves all four limbs. No tremors. Asks the same questions repeatedly.      Laboratory results:    Results from last 7 days   Lab Units 09/12/21  0606 09/10/21  1910   SODIUM mmol/L 136 138   POTASSIUM mmol/L 4.1 5.1   CHLORIDE mmol/L 101 98   CO2 mmol/L 27.4 30.0*   BUN mg/dL 20 22   CREATININE mg/dL 0.67 0.75   CALCIUM mg/dL 8.9 10.3   BILIRUBIN mg/dL <0.2 0.3   ALK PHOS U/L 96 122*   ALT (SGPT) U/L 15 11   AST (SGOT) U/L 90* 20   GLUCOSE mg/dL 89 101*     Results from last 7 days   Lab Units 09/12/21  0606 09/10/21  1910   WBC 10*3/mm3 4.50 6.16   HEMOGLOBIN g/dL 12.1 15.4   HEMATOCRIT % 37.7 48.6*   PLATELETS 10*3/mm3 177 223         Results from last 7 days   Lab Units 09/10/21  1910   TROPONIN T ng/mL <0.010     Results from last 7 days   Lab Units 09/10/21  1929   URINECX  50,000 CFU/mL Aerococcus viridans*         I have reviewed the patient's laboratory results.    Radiology results:    No radiology results from the last 24 hrs  I have reviewed the patient's radiology reports.    Medication Review:     I have reviewed the patient's active and prn medications.     Problem List:      Urinary tract infection without hematuria    Acute exacerbation of chronic obstructive pulmonary disease (COPD) (CMS/[facility])    Multiple sclerosis (CMS/[facility])    Tobacco abuse    COPD (chronic obstructive pulmonary disease) (CMS/[facility])    Declining functional status    Trigeminal neuralgia of left side of face    Altered mental status    SVT (supraventricular tachycardia) (CMS/[facility])    Chronic respiratory failure (CMS/[facility])    Underweight      Assessment:    1. Aerococcus Urinary Tract Infection, POA  2. Acute COPD Exacerbation  3. SVT s/p Cardioversion  4. Acute Encephalopathy, Improved  5. Declining Functional Status  6. Chronic Respiratory Failure  7. Protein Calorie  "Malnutrition    Plan:    - Urine culture returned positive for aerococcus, which was likely contributing to her encephalopathy on arrival. Have transitioned her from Ceftriaxone to Vancomycin.  - Per family request, Cardiology was consulted for her SVT s/p cardioversion. Unfortunately, rhythm strips were not obtained during these episodes so the cardiologist has recommended decreasing her Amiodarone to 200mg daily.  - Continue supportive care  - PT/OT  - Long discussion held with both the patient and the daughter regarding discharge disposition. Patient is adament that she return home at time of discharge. Patient's  has requested this as well, stating that he wants to \"try to care for her one more time\" before they would ever consider placement/rehab. Daughter expresses her wishes for patient placed, but again, patient is AAOx3 and does not want placement. Case management is working with the patient and family and their assistance is appreciated.    DVT Prophylaxis: Eliquis  Code Status: DNR  Diet: Cardiac  Discharge Plan: Home when medically stable    Mirella Bowers,   09/13/21  18:46 EDT    Dictated utilizing Dragon dictation.    " Detail Level: Simple

## 2021-09-13 NOTE — CONSULTS
"[facility] Consult Note    Referring Provider: Enriqueta  Reason for Consultation: Arrhythmia    Patient Care Team:  Gregorio Jackson MD as PCP - General (Internal Medicine)    Chief complaint : Altered mental status    Subjective:    History of present illness: This is an 81-year-old female patient who presented to her primary care provider's office with altered mental status.  She was sent to the emergency room where subsequent investigation demonstrated dehydration, severe hypoxemia with room air oxygen saturation of 84% and evidence of a severe urinary tract infection.  The patient was provided IV antibiotics and IV fluids.  While in the intensive care unit the patient developed an episode of \"unresponsiveness\".  Her heart rate was documented to be 190 bpm and her blood pressure was reported to be 60/40 mmHg.  Unfortunately a twelve-lead electrocardiogram was not performed and there is no copies of her rhythm strips during this time.  The patient underwent emergency synchronized cardioversion with successful return of normal sinus rhythm.  She was started on an IV amiodarone drip and subsequently changed to oral amiodarone.  I have been unable to obtain any rhythm strips of her tachycardia.  The patient has no recollection of the events in the emergency room.  The patient has a longstanding history of emphysema and continues to smoke at least 1/2 pack of cigarettes per day.  She has no personal history of hypertension diabetes or dyslipidemia.  She has no history of myocardial infarction or coronary revascularization.  She has no known history of vascular disease.  She has no history of valvular heart disease or congestive heart failure.  The patient denies ever having had prior atrial fibrillation or other arrhythmias.  She is currently asymptomatic and desires discharge to home.    Review of Systems   Review of Systems   Constitutional: Negative for chills, diaphoresis, fever, malaise/fatigue, weight gain " "and weight loss.   HENT: Negative for ear discharge, hearing loss, hoarse voice and nosebleeds.    Eyes: Negative for discharge, double vision, pain and photophobia.   Cardiovascular: Negative for chest pain, claudication, cyanosis, dyspnea on exertion, irregular heartbeat, leg swelling, near-syncope, orthopnea, palpitations, paroxysmal nocturnal dyspnea and syncope.   Respiratory: Negative for cough, hemoptysis, shortness of breath, sputum production and wheezing.    Endocrine: Negative for cold intolerance, heat intolerance, polydipsia, polyphagia and polyuria.   Hematologic/Lymphatic: Negative for adenopathy and bleeding problem. Does not bruise/bleed easily.   Skin: Negative for color change, flushing, itching and rash.   Musculoskeletal: Negative for muscle cramps, muscle weakness, myalgias and stiffness.   Gastrointestinal: Negative for abdominal pain, diarrhea, hematemesis, hematochezia, nausea and vomiting.   Genitourinary: Negative for dysuria, frequency and nocturia.   Neurological: Negative for focal weakness, loss of balance, numbness, paresthesias and seizures.   Psychiatric/Behavioral: Negative for altered mental status, hallucinations and suicidal ideas.   Allergic/Immunologic: Negative for HIV exposure, hives and persistent infections.       History  Past Medical History:   Diagnosis Date   • Body piercing     EARS   • Constipation    • COPD (chronic obstructive pulmonary disease) (CMS/[facility])    • Cough     NOTED WHILE PATIENT WAS IN PREADMISSION TESTING. PATIENT REPORTS \"CLEAR PHLEM\" WITH NO FEVER AND STATED \"IT'S JUST A SMOKERS COUGH\"   • Full dentures     INSTRUCTED NO ADHESIVES THE DOS   • GERD (gastroesophageal reflux disease)     REPORTS ONLY INTERMITTENT EPISODES   • Hearing loss     PATIENT REPORTS AGE RELATED. NO USE OF HEARING AIDS.    • History of fracture     REPORTS HISTORY OF FRACTURED PELVIS.  REPORTS FALL EARLY JAN 2019 RESULTING IN FRACTURED LEFT SHOULDER.   • History of pneumonia     " "REPORTS MULTIPLE TIMES   • History of transfusion     REPORTS AS A TEEN AND THAT SHE DID NOT HAVE ANY REACTIONS TO TRANSFUSION   • Hypotension     REPORTS \"MY BLOOD PRESSURE IS ALWAYS LOW - I HAVE NEVER BEEN ABLE TO DONATE BLOOD\"   • Impaired functional mobility, balance, gait, and endurance 09/13/2021   • Impaired mobility     RELATED TO MULTIPLE SCLEROSIS   • MS (multiple sclerosis) (CMS/[facility])    • Tattoo    • Wears glasses    ,   Past Surgical History:   Procedure Laterality Date   • APPENDECTOMY      REPORTS SHE THINKS SHE HAD APPENDIX REMOVED WHEN GALLBLADDER WAS REMOVED.   • CHOLECYSTECTOMY     • CYBERKNIFE  03/12/2020    left trigeminal nerve   • EYE SURGERY      Corneal implant, PATIENT REPORTS SHE IS UNSURE LATERALITY   • MOUTH SURGERY      FULL MOUTH EXTRACTION   • TOTAL SHOULDER ARTHROPLASTY Left 2/7/2019    Procedure: Left reverse total shoulder arthroplasty;  Surgeon: Eldon Valadez MD;  Location: [facility];  Service: Orthopedics   ,   Family History   Family history unknown: Yes   ,   Social History     Tobacco Use   • Smoking status: Current Every Day Smoker     Packs/day: 0.50     Years: 58.00     Pack years: 29.00     Types: Cigarettes   • Smokeless tobacco: Never Used   • Tobacco comment: REPORTS SHE HAS SMOKED UP TO 1 PPD IN THE PAST   Substance Use Topics   • Alcohol use: No   • Drug use: No   ,   Medications Prior to Admission   Medication Sig Dispense Refill Last Dose   • HYDROcodone-acetaminophen (NORCO) 5-325 MG per tablet Take 1 tablet by mouth Every 8 (Eight) Hours As Needed for Mild Pain .      • Acetaminophen-Caffeine (EXCEDRIN TENSION HEADACHE) 500-65 MG tablet Take 1 tablet by mouth As Needed (HEADACHE).      • albuterol sulfate HFA (PROAIR HFA) 108 (90 Base) MCG/ACT inhaler ProAir HFA 90 mcg/actuation aerosol inhaler   Inhale 2 puffs every 4 hours by inhalation route.      • budesonide (PULMICORT) 0.5 MG/2ML nebulizer solution Take 2 mL by nebulization 2 (Two) Times a Day. 180 each " "3    • carBAMazepine (TEGretol) 200 MG tablet Take 200 mg by mouth 2 (Two) Times a Day.      • fluticasone-salmeterol (ADVAIR DISKUS) 250-50 MCG/DOSE DISKUS Advair Diskus 250 mcg-50 mcg/dose powder for inhalation      • ipratropium-albuterol (DUO-NEB) 0.5-2.5 mg/3 ml nebulizer Take 3 mL by nebulization 4 (Four) Times a Day As Needed for Wheezing. 360 mL 3    • Multiple Vitamins-Minerals (MULTIVITAMIN ADULTS PO) Take 1 tablet by mouth Daily.      • nicotine (NICODERM CQ) 21 MG/24HR patch Place 1 patch on the skin as directed by provider Daily. 30 patch 0    • PARoxetine (PAXIL) 20 MG tablet Take 20 mg by mouth Every Morning. (Patient not taking: Reported on 9/11/2021)   Not Taking at Unknown time   • propranolol (INDERAL) 20 MG tablet Take 20 mg by mouth 3 (Three) Times a Day.      • sennosides-docusate sodium (SENOKOT-S) 8.6-50 MG tablet Take 2 tablets by mouth Every Night.       and Allergies:  Patient has no known allergies.    Objective:    Vital Sign Min/Max for last 24 hours  Temp  Min: 97.8 °F (36.6 °C)  Max: 98.4 °F (36.9 °C)   BP  Min: 135/87  Max: 151/83   Pulse  Min: 71  Max: 89   Resp  Min: 18  Max: 20   SpO2  Min: 96 %  Max: 97 %   No data recorded   Weight  Min: 45.4 kg (100 lb 1.4 oz)  Max: 45.4 kg (100 lb 1.4 oz)     Flowsheet Rows      First Filed Value   Admission Height  162.6 cm (64\") Documented at 09/10/2021 1704   Admission Weight  45.4 kg (100 lb) Documented at 09/10/2021 1704             Physical Exam:   Vitals and nursing note reviewed.   Constitutional:       Appearance: Healthy appearance. Not in distress.   Neck:      Vascular: No JVR. JVD normal.   Pulmonary:      Effort: Pulmonary effort is normal.      Breath sounds: Normal breath sounds. No wheezing. No rhonchi. No rales.   Chest:      Chest wall: Not tender to palpatation.   Cardiovascular:      PMI at left midclavicular line. Normal rate. Regular rhythm. Normal S1. Normal S2.      Murmurs: There is no murmur.      No gallop. No " click. No rub.   Pulses:     Intact distal pulses.   Edema:     Peripheral edema absent.   Abdominal:      General: Bowel sounds are normal.      Palpations: Abdomen is soft.      Tenderness: There is no abdominal tenderness.   Musculoskeletal: Normal range of motion.         General: No tenderness. Skin:     General: Skin is warm and dry.   Neurological:      General: No focal deficit present.      Mental Status: Alert and oriented to person, place and time.         Results Review:   I reviewed the patient's new clinical results.  Results from last 7 days   Lab Units 09/12/21  0606 09/10/21  1910   WBC 10*3/mm3 4.50 6.16   HEMOGLOBIN g/dL 12.1 15.4   HEMATOCRIT % 37.7 48.6*   PLATELETS 10*3/mm3 177 223     Results from last 7 days   Lab Units 09/12/21  0606 09/10/21  1910 09/10/21  1910   SODIUM mmol/L 136  --  138   POTASSIUM mmol/L 4.1  --  5.1   CHLORIDE mmol/L 101  --  98   CO2 mmol/L 27.4  --  30.0*   BUN mg/dL 20  --  22   CREATININE mg/dL 0.67  --  0.75   GLUCOSE mg/dL 89   < > 101*   CALCIUM mg/dL 8.9  --  10.3    < > = values in this interval not displayed.     Lab Results   Lab Value Date/Time    TROPONINT <0.010 09/10/2021 1910             Assessment/Plan:      Urinary tract infection without hematuria    Acute exacerbation of chronic obstructive pulmonary disease (COPD) (CMS/[facility])    Multiple sclerosis (CMS/[facility])    Tobacco abuse    COPD (chronic obstructive pulmonary disease) (CMS/[facility])    Declining functional status    Trigeminal neuralgia of left side of face    Altered mental status    SVT (supraventricular tachycardia) (CMS/[facility])    Chronic respiratory failure (CMS/[facility])    Underweight      Unfortunately the patient's arrhythmia that required cardioversion was not recorded.  If the patient had atrial fibrillation with rapid ventricular response the treatment of choice would be anticoagulation with Eliquis 2.5 mg orally twice daily.  I have recommended empirically decreasing the amiodarone dose to 200  mg/day.  If the patient's presenting arrhythmia was PSVT/AVNRT anticoagulation therapy is not indicated and treatment of choice would be Cardizem CD.  If someone can obtain the rhythm strips I will be more than happy to review them.    I discussed the patients findings and my recommendations with patient    Tyrone Diaz MD  09/13/21  15:10 EDT

## 2021-09-13 NOTE — NURSING NOTE
Patient's daughter asked why patient wasn't receiving all of her home medications, including levothyroxine, propanolol, and hydrocodone 5/325.  Patient's daughter wasn't sure about the medication doses, but stated the patient got her prescriptions filled at [facility] pharmacy in [place].     RN called  pharmacy to get correct doses, but was told that levothyroxine 50mcg had not been filled since March and propanolol 40mg had not been filled since May.  Hydrocodone 5/325 was on [facility] med list, but had not been reviewed yet.        RN sent an update to Dr. Robison about the status of the patient's home medications.    Bonnie Ivy RN

## 2021-09-13 NOTE — PLAN OF CARE
Goal Outcome Evaluation:  Plan of Care Reviewed With: patient        Progress: no change  Outcome Summary: OT eval completed. Patient presents deficits in strength, endurance, balance, mobility, coordination and ADL performance. Patient completed bed mobility, transfer and functional mobility with min A, distance was limited by dizziness and legs feeling weak. Patient requires increased assistance with ADLs; noted to have UE tremor when completing FMC tasks. Patient is expected to benefit from continued OT services prior to DC and would benefit from HH services at DC.

## 2021-09-14 NOTE — PLAN OF CARE
Problem: Skin Injury Risk Increased  Goal: Skin Health and Integrity  Outcome: Ongoing, Progressing  Intervention: Optimize Skin Protection  Recent Flowsheet Documentation  Taken 9/14/2021 1400 by Lillie Thomson RN  Pressure Reduction Techniques: weight shift assistance provided  Head of Bed (HOB): HOB at 30 degrees  Pressure Reduction Devices: pressure-redistributing mattress utilized  Skin Protection: incontinence pads utilized  Taken 9/14/2021 1200 by Lillie Thomson RN  Pressure Reduction Techniques: weight shift assistance provided  Head of Bed (HOB): HOB at 30 degrees  Pressure Reduction Devices: pressure-redistributing mattress utilized  Skin Protection: incontinence pads utilized  Taken 9/14/2021 1000 by Lillie Thomson RN  Pressure Reduction Techniques: weight shift assistance provided  Head of Bed (HOB): HOB at 30 degrees  Pressure Reduction Devices: pressure-redistributing mattress utilized  Skin Protection: incontinence pads utilized  Taken 9/14/2021 0810 by Lillie Thomson RN  Pressure Reduction Techniques: weight shift assistance provided  Pressure Reduction Devices: pressure-redistributing mattress utilized  Skin Protection: incontinence pads utilized     Problem: Fall Injury Risk  Goal: Absence of Fall and Fall-Related Injury  Outcome: Ongoing, Progressing  Intervention: Identify and Manage Contributors to Fall Injury Risk  Recent Flowsheet Documentation  Taken 9/14/2021 1200 by Lillie Thomson RN  Medication Review/Management: medications reviewed  Taken 9/14/2021 0810 by Lillie Thomson RN  Medication Review/Management: medications reviewed  Intervention: Promote Injury-Free Environment  Recent Flowsheet Documentation  Taken 9/14/2021 1400 by Lillie Thomson RN  Safety Promotion/Fall Prevention:   activity supervised   assistive device/personal items within reach   clutter free environment maintained   nonskid shoes/slippers when out of bed  Taken 9/14/2021 1200 by Lillie Thomson RN  Safety Promotion/Fall  Prevention:   activity supervised   assistive device/personal items within reach   clutter free environment maintained   nonskid shoes/slippers when out of bed  Taken 9/14/2021 1000 by Lillie Thomson RN  Safety Promotion/Fall Prevention:   activity supervised   assistive device/personal items within reach   clutter free environment maintained   nonskid shoes/slippers when out of bed  Taken 9/14/2021 0810 by Lillie Thomson RN  Safety Promotion/Fall Prevention:   activity supervised   assistive device/personal items within reach   clutter free environment maintained   nonskid shoes/slippers when out of bed     Problem: Adult Inpatient Plan of Care  Goal: Plan of Care Review  Outcome: Ongoing, Progressing  Goal: Patient-Specific Goal (Individualized)  Outcome: Ongoing, Progressing  Goal: Absence of Hospital-Acquired Illness or Injury  Outcome: Ongoing, Progressing  Intervention: Identify and Manage Fall Risk  Recent Flowsheet Documentation  Taken 9/14/2021 1400 by Lillie Thomson RN  Safety Promotion/Fall Prevention:   activity supervised   assistive device/personal items within reach   clutter free environment maintained   nonskid shoes/slippers when out of bed  Taken 9/14/2021 1200 by Lillie Thomson RN  Safety Promotion/Fall Prevention:   activity supervised   assistive device/personal items within reach   clutter free environment maintained   nonskid shoes/slippers when out of bed  Taken 9/14/2021 1000 by Lillie Thomson RN  Safety Promotion/Fall Prevention:   activity supervised   assistive device/personal items within reach   clutter free environment maintained   nonskid shoes/slippers when out of bed  Taken 9/14/2021 0810 by Lillie Tohmson RN  Safety Promotion/Fall Prevention:   activity supervised   assistive device/personal items within reach   clutter free environment maintained   nonskid shoes/slippers when out of bed  Intervention: Prevent Skin Injury  Recent Flowsheet Documentation  Taken 9/14/2021 1400 by Lillie Thomson  RN  Body Position: supine  Skin Protection: incontinence pads utilized  Taken 9/14/2021 1200 by Lillie Thomson RN  Body Position: supine  Skin Protection: incontinence pads utilized  Taken 9/14/2021 1000 by Lillie Thomson RN  Body Position: supine  Skin Protection: incontinence pads utilized  Taken 9/14/2021 0810 by Lillie Thomson RN  Body Position: (up in chair ) other (see comments)  Skin Protection: incontinence pads utilized  Intervention: Prevent and Manage VTE (venous thromboembolism) Risk  Recent Flowsheet Documentation  Taken 9/14/2021 0810 by Lillie Thomson RN  VTE Prevention/Management: (see mar ) other (see comments)  Intervention: Prevent Infection  Recent Flowsheet Documentation  Taken 9/14/2021 1400 by Lillie Thomson RN  Infection Prevention: rest/sleep promoted  Taken 9/14/2021 1200 by Lillie Thomson RN  Infection Prevention: rest/sleep promoted  Taken 9/14/2021 1000 by Lillie Thomson RN  Infection Prevention: rest/sleep promoted  Taken 9/14/2021 0810 by Lillie Thomson RN  Infection Prevention: rest/sleep promoted  Goal: Optimal Comfort and Wellbeing  Outcome: Ongoing, Progressing  Intervention: Provide Person-Centered Care  Recent Flowsheet Documentation  Taken 9/14/2021 1200 by Lillie Thomson RN  Trust Relationship/Rapport:   care explained   questions answered  Taken 9/14/2021 0810 by Lillie Thomson RN  Trust Relationship/Rapport:   care explained   questions answered  Goal: Readiness for Transition of Care  Outcome: Ongoing, Progressing   Goal Outcome Evaluation:

## 2021-09-14 NOTE — THERAPY TREATMENT NOTE
PT treatment attempted today, patient was sound asleep in the bed with  at the bedside and he requested we let her rest. PT will f/u with patient tomorrow.

## 2021-09-14 NOTE — PLAN OF CARE
Goal Outcome Evaluation:  Plan of Care Reviewed With: patient        Progress: improving  Outcome Summary: OT tx completed. patient in chair, requested BSC; required min-mod A for toileting d/t urinary incontinence. Patient completed tf and side steps toward head of bed with min A, sat EOB and completed grooming tasks with min A and UB AROM. Patient declined return to bed, stated she needed a nap and pain med for a headache. Continue OT POC

## 2021-09-14 NOTE — PLAN OF CARE
Goal Outcome Evaluation:  Plan of Care Reviewed With: patient      Progress: no change   VSS. No acute events this shift, Will continue to monitor

## 2021-09-14 NOTE — PLAN OF CARE
Goal Outcome Evaluation:      Spoke with pt at her bedside.  Pt is disappointed that she was unable to go home today. She stated that she was very sleepy and unable to stay awake today, and even though she loves her , she had sent him home.  Because we have been friends for many years, she commented that we are sisters in our [employer] family, and I agreed.  I will continue to follow to provide emotional and spiritual support.

## 2021-09-14 NOTE — THERAPY TREATMENT NOTE
"Patient Name: Maria Dolores Campa  : 1940    MRN: [id number]                              Today's Date: 2021       Admit Date: 9/10/2021    Visit Dx:     ICD-10-CM ICD-9-CM   1. Altered mental status, unspecified altered mental status type  R41.82 780.97   2. Urinary tract infection without hematuria, site unspecified  N39.0 599.0   3. Bradycardia  R00.1 427.89   4. 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 PATIENT REPORTS \"CLEAR PHLEM\" WITH NO FEVER AND STATED \"IT'S JUST A SMOKERS COUGH\"   • Full dentures     INSTRUCTED NO ADHESIVES THE DOS   • GERD (gastroesophageal reflux disease)     REPORTS ONLY INTERMITTENT EPISODES   • Hearing loss     PATIENT REPORTS AGE RELATED. NO USE OF HEARING AIDS.    • History of fracture     REPORTS HISTORY OF FRACTURED PELVIS.  REPORTS FALL EARLY 2019 RESULTING IN FRACTURED LEFT SHOULDER.   • History of pneumonia     REPORTS MULTIPLE TIMES   • History of transfusion     REPORTS AS A TEEN AND THAT SHE DID NOT HAVE ANY REACTIONS TO TRANSFUSION   • " "Hypotension     REPORTS \"MY BLOOD PRESSURE IS ALWAYS LOW - I HAVE NEVER BEEN ABLE TO DONATE BLOOD\"   • Impaired functional mobility, balance, gait, and endurance 09/13/2021   • Impaired mobility     RELATED TO MULTIPLE SCLEROSIS   • MS (multiple sclerosis) (CMS/H[facility])    • Tattoo    • Wears glasses      Past Surgical History:   Procedure Laterality Date   • APPENDECTOMY      REPORTS SHE THINKS SHE HAD APPENDIX REMOVED WHEN GALLBLADDER WAS REMOVED.   • CHOLECYSTECTOMY     • CYBERKNIFE  03/12/2020    left trigeminal nerve   • EYE SURGERY      Corneal implant, PATIENT REPORTS SHE IS UNSURE LATERALITY   • MOUTH SURGERY      FULL MOUTH EXTRACTION   • TOTAL SHOULDER ARTHROPLASTY Left 2/7/2019    Procedure: Left reverse total shoulder arthroplasty;  Surgeon: Eldon Valadez MD;  Location: [facility];  Service: Orthopedics     General Information     Row Name 09/14/21 1229          OT Time and Intention    Document Type  therapy note (daily note)  -SD     Mode of Treatment  occupational therapy  -SD       User Key  (r) = Recorded By, (t) = Taken By, (c) = Cosigned By    Initials Name Provider Type    SD Nitza Alexander OT Occupational Therapist          Mobility/ADL's     Row Name 09/14/21 1229          Bed Mobility    Bed Mobility  sit-supine  -SD     Sit-Supine [place] (Bed Mobility)  modified independence  -SD     Assistive Device (Bed Mobility)  bed rails;head of bed elevated  -SD     Row Name 09/14/21 1229          Transfers    Transfers  sit-stand transfer;toilet transfer  -SD     Sit-Stand [place] (Transfers)  minimum assist (75% patient effort)  -SD     [place] Level (Toilet Transfer)  minimum assist (75% patient effort)  -SD     Assistive Device (Toilet Transfer)  commode, bedside without drop arms  -SD     Row Name 09/14/21 1229          Toilet Transfer    Type (Toilet Transfer)  stand pivot/stand step  -SD     Row Name 09/14/21 1229          Functional Mobility    Functional Mobility- Ind. Level "  minimum assist (75% patient effort)  -SD     Functional Mobility-Distance (Feet)  6  -SD     Functional Mobility- Comment  HHA, gait belt  -SD     Row Name 09/14/21 1229          Grooming Assessment/Training    [place] Level (Grooming)  hair care, combing/brushing;oral care regimen;wash face, hands;minimum assist (75% patient effort)  -SD     Position (Grooming)  edge of bed sitting  -SD     Row Name 09/14/21 1229          Toileting Assessment/Training    [place] Level (Toileting)  toileting skills;adjust/manage clothing;change pad/brief;perform perineal hygiene;moderate assist (50% patient effort);minimum assist (75% patient effort)  -SD     Assistive Devices (Toileting)  commode, bedside without drop arms  -SD     Comment (Toileting)  urinary incontinence  -SD       User Key  (r) = Recorded By, (t) = Taken By, (c) = Cosigned By    Initials Name Provider Type    Nitza Richmond OT Occupational Therapist        Obj/Interventions     Row Name 09/14/21 1230          Therapeutic Exercise    Therapeutic Exercise  shoulder;elbow/forearm;wrist;hand BUE AROM x 15  -SD       User Key  (r) = Recorded By, (t) = Taken By, (c) = Cosigned By    Initials Name Provider Type    Nitza Richmond OT Occupational Therapist        Goals/Plan    No documentation.       Clinical Impression     Row Name 09/14/21 1230          Pain Scale: Numbers Pre/Post-Treatment    Pretreatment Pain Rating  5/10  -SD     Posttreatment Pain Rating  5/10  -SD     Pain Location  head  -SD     Pain Intervention(s)  Repositioned;Ambulation/increased activity  -SD     Row Name 09/14/21 1230          Plan of Care Review    Plan of Care Reviewed With  patient  -SD     Progress  improving  -SD     Outcome Summary  OT tx completed. patient in chair, requested BSC; required min-mod A for toileting d/t urinary incontinence. Patient completed tf and side steps toward head of bed with min A, sat EOB and completed grooming tasks with min A and UB  AROM. Patient declined return to bed, stated she needed a nap and pain med for a headache. Continue OT POC  -SD     Row Name 09/14/21 1230          Positioning and Restraints    Pre-Treatment Position  sitting in chair/recliner  -SD     Post Treatment Position  bed  -SD     In Bed  fowlers;call light within reach;encouraged to call for assist;exit alarm on;notified nsg  -SD       User Key  (r) = Recorded By, (t) = Taken By, (c) = Cosigned By    Initials Name Provider Type    Nitza Richmond OT Occupational Therapist        Outcome Measures     Row Name 09/14/21 1232          How much help from another is currently needed...    Putting on and taking off regular lower body clothing?  3  -SD     Bathing (including washing, rinsing, and drying)  2  -SD     Toileting (which includes using toilet bed pan or urinal)  2  -SD     Putting on and taking off regular upper body clothing  3  -SD     Taking care of personal grooming (such as brushing teeth)  3  -SD     Eating meals  3  -SD     AM-PAC 6 Clicks Score (OT)  16  -SD     Row Name 09/14/21 1232          Functional Assessment    Outcome Measure Options  AM-PAC 6 Clicks Daily Activity (OT)  -SD       User Key  (r) = Recorded By, (t) = Taken By, (c) = Cosigned By    Initials Name Provider Type    Nitza Richmond OT Occupational Therapist          Occupational Therapy Education                 Title: PT OT SLP Therapies (In Progress)     Topic: Occupational Therapy (In Progress)     Point: ADL training (Done)     Description:   Instruct learner(s) on proper safety adaptation and remediation techniques during self care or transfers.   Instruct in proper use of assistive devices.              Learning Progress Summary           Patient Acceptance, E,TB, VU by SD at 9/14/2021 1232    Comment: Safety and sequencing during functional transfer and mobility; encouraged patient to stay in chair longer but she declined.    Acceptance, E,TB, VU by SD at 9/13/2021 1256     Comment: Benefit of OT; OT POC                   Point: Home exercise program (Not Started)     Description:   Instruct learner(s) on appropriate technique for monitoring, assisting and/or progressing therapeutic exercises/activities.              Learner Progress:  Not documented in this visit.          Point: Precautions (Not Started)     Description:   Instruct learner(s) on prescribed precautions during self-care and functional transfers.              Learner Progress:  Not documented in this visit.          Point: Body mechanics (Not Started)     Description:   Instruct learner(s) on proper positioning and spine alignment during self-care, functional mobility activities and/or exercises.              Learner Progress:  Not documented in this visit.                      User Key     Initials Effective Dates Name Provider Type Discipline    SD 06/16/21 -  Nitza Alexander OT Occupational Therapist OT              OT Recommendation and Plan  Planned Therapy Interventions (OT): activity tolerance training, adaptive equipment training, BADL retraining, patient/caregiver education/training, ROM/therapeutic exercise, strengthening exercise, transfer/mobility retraining  Therapy Frequency (OT): 3 times/wk (5 times if indicated)  Plan of Care Review  Plan of Care Reviewed With: patient  Progress: improving  Outcome Summary: OT tx completed. patient in chair, requested BSC; required min-mod A for toileting d/t urinary incontinence. Patient completed tf and side steps toward head of bed with min A, sat EOB and completed grooming tasks with min A and UB AROM. Patient declined return to bed, stated she needed a nap and pain med for a headache. Continue OT POC     Time Calculation:   Time Calculation- OT     Row Name 09/14/21 1234             Time Calculation- OT    OT Start Time  0944  -SD      OT Stop Time  1008  -SD      OT Time Calculation (min)  24 min  -SD      OT Received On  09/14/21  -SD      OT Goal Re-Cert Due  Date  09/23/21  -SD         Timed Charges    13541 - OT Therapeutic Exercise Minutes  10  -SD      98478 - OT Self Care/Mgmt Minutes  14  -SD         Total Minutes    Timed Charges Total Minutes  24  -SD       Total Minutes  24  -SD        User Key  (r) = Recorded By, (t) = Taken By, (c) = Cosigned By    Initials Name Provider Type    Nitza Richmond OT Occupational Therapist        Therapy Charges for Today     Code Description Service Date Service Provider Modifiers Qty    02520290937  OT EVAL LOW COMPLEXITY 3 9/13/2021 Nitza Alexander OT GO 1    [id number]  OT THER PROC EA 15 MIN 9/14/2021 Nitza Alexander OT GO 1    [id number]  OT SELF CARE/MGMT/TRAIN EA 15 MIN 9/14/2021 Nitza Alexander OT GO 1               Nitza Alexander OT  9/14/2021

## 2021-09-15 NOTE — DISCHARGE SUMMARY
[facility]   DISCHARGE SUMMARY      Name:  Maria Dolores Campa   Age:  81 y.o.  Sex:  female  :  1940  MRN:  [id number]   Visit Number:  [id number]  Primary Care Physician:  Gregorio Jackson MD  Date of Discharge:  9/15/2021  Admission Date:  9/10/2021      Discharge Diagnosis:           Urinary tract infection without hematuria    Acute exacerbation of chronic obstructive pulmonary disease (COPD) (CMS/[facility])    Multiple sclerosis (CMS/[facility])    Tobacco abuse    COPD (chronic obstructive pulmonary disease) (CMS/[facility])    Declining functional status    Trigeminal neuralgia of left side of face    Altered mental status    SVT (supraventricular tachycardia) (CMS/[facility])    Chronic respiratory failure (CMS/[facility])    Underweight      Presenting Problem/History of Present Illness:    Altered mental status, unspecified altered mental status type [R41.82]     Consults:     Consults     Date and Time Order Name Status Description    9/15/2021  8:18 AM Inpatient Cardiology Consult      2021  1:37 PM Inpatient Cardiology Consult Completed           Procedures Performed:             History of presenting illness:    81-year-old female brought to primary care provider's office with complaints of nausea dysuria and confusion she was found to have an O2 sat of 84% on room air. She was admitted  through the emergency room for further evaluation and management of her symptoms. Urine analysis showed evidence of infection. On arrival to the emergency room was noted to be in a supraventricular rhythm for which she was placed on an amiodarone drip.  While in the emergency room she had an episode of unresponsiveness. No rhythm strip is available at this time.  she was placed on empiric antibiotic therapy for UTI    Hospital Course:    Mental status improved significantly during the stay in the hospital. Lab work without evidence of significant dehydration or electrolyte imbalance. There was no  significant elevation of her white count. Her COVID-19 test was negative  She is placed on vancomycin empirically initially. Urine culture showed colony size of 50,000 of Aerococcus viridans. She has been subsequently switched over to oral doxycycline now  Cardiology consult obtained she was switched over to oral amiodarone. Eliquis started at 2.5 mg twice a day which she is tolerating well. She has maintained a sinus rhythm during her stay in the hospital here  Review of Systems:  Review of Systems   Constitutional: Positive for fatigue. Negative for activity change, appetite change and fever.   HENT: Negative for congestion, ear discharge, ear pain and trouble swallowing.    Eyes: Negative for photophobia and visual disturbance.   Respiratory: Negative for cough and shortness of breath.    Cardiovascular: Negative for chest pain and palpitations.   Gastrointestinal: Negative for abdominal distention, abdominal pain, constipation, diarrhea, nausea and vomiting.   Endocrine: Negative.    Genitourinary: Negative for dysuria, hematuria and urgency.   Musculoskeletal: Positive for arthralgias. Negative for back pain, joint swelling and myalgias.   Skin: Negative for color change and rash.   Allergic/Immunologic: Negative.    Neurological: Negative for dizziness, weakness, light-headedness and headaches.   Hematological: Negative for adenopathy. Does not bruise/bleed easily.   Psychiatric/Behavioral: Positive for decreased concentration and sleep disturbance. Negative for agitation, confusion and dysphoric mood. The patient is not nervous/anxious.         Vital Signs:    Temp:  [97.6 °F (36.4 °C)-98.6 °F (37 °C)] 98.5 °F (36.9 °C)  Heart Rate:  [73-82] 73  Resp:  [16-20] 18  BP: (124-147)/(78-92) 140/79    Physical Exam:  Physical Exam  Constitutional:       General: She is not in acute distress.     Appearance: She is well-developed.   HENT:      Nose: Nose normal.   Eyes:      General: No scleral icterus.      Conjunctiva/sclera: Conjunctivae normal.   Neck:      Thyroid: No thyromegaly.      Trachea: No tracheal deviation.   Cardiovascular:      Rate and Rhythm: Normal rate and regular rhythm.      Heart sounds: No murmur heard.   No friction rub.   Pulmonary:      Effort: No respiratory distress.      Breath sounds: No wheezing or rales.   Abdominal:      General: There is no distension.      Palpations: Abdomen is soft. There is no mass.      Tenderness: There is no abdominal tenderness. There is no guarding.   Musculoskeletal:         General: Deformity present. Normal range of motion.   Lymphadenopathy:      Cervical: No cervical adenopathy.   Skin:     General: Skin is warm and dry.      Findings: No erythema or rash.   Neurological:      Mental Status: She is alert and oriented to person, place, and time.      Cranial Nerves: No cranial nerve deficit.      Coordination: Coordination normal.      Deep Tendon Reflexes: Reflexes are normal and symmetric.   Psychiatric:         Behavior: Behavior normal.         Thought Content: Thought content normal.         Judgment: Judgment normal.         Pertinent Lab Results:     Lab Results (last 72 hours)     Procedure Component Value Units Date/Time    Urinalysis With Microscopic If Indicated (No Culture) - Urine, Clean Catch [[id number]]  (Abnormal) Collected: 09/14/21 1003    Specimen: Urine, Clean Catch Updated: 09/14/21 1015     Color, UA Yellow     Appearance, UA Cloudy     pH, UA 8.0     Specific Gravity, UA 1.010     Glucose, UA Negative     Ketones, UA Negative     Bilirubin, UA Negative     Blood, UA Negative     Protein, UA Negative     Leuk Esterase, UA Negative     Nitrite, UA Negative     Urobilinogen, UA 0.2 E.U./dL    Narrative:      Urine microscopic not indicated.    Urine Culture - Urine, Urine, Clean Catch [[id number]]  (Abnormal) Collected: 09/10/21 1929    Specimen: Urine, Clean Catch Updated: 09/13/21 0803     Urine Culture 50,000 CFU/mL Aerococcus  viridans     Comment: Aerococcus viridans is usually Susceptible to Trimethoprim sulfamethoxazole, Vancomycin, Gentamicin, and Tetracycline and Resistant to Penicillin.               Pertinent Radiology Results:    Imaging Results (All)     Procedure Component Value Units Date/Time    XR Chest 1 View [[id number]] Collected: 09/11/21 0848     Updated: 09/11/21 0851    Narrative:      PROCEDURE: XR CHEST 1 VW-     HISTORY: Weak/Dizzy/AMS triage protocol     COMPARISON: 01/30/2019.     FINDINGS: The heart is normal in size. The mediastinum is unremarkable.  Bibasilar opacities, correlate for possible pneumonia. There is no  pneumothorax.  There are no acute osseous abnormalities.       Impression:      Bibasilar opacities, correlate for possible pneumonia.     Continued followup is recommended.     This report was finalized on 9/11/2021 8:49 AM by Niranjan Rosas DO.    CT Head Without Contrast [[id number]] Collected: 09/10/21 2206     Updated: 09/10/21 2207    Narrative:      FINAL REPORT    TECHNIQUE:  Multiple axial CT images were performed from the foramen magnum  to the vertex. This study was performed with techniques to keep  radiation doses as low as reasonably achievable (ALARA).  Individualized dose reduction techniques using automated  exposure control or adjustment of mA and/or kV according to the  patient's size were employed.    CLINICAL HISTORY:  Delirium    FINDINGS:  Exam is significantly limited due to patient motion.    There is  mild generalized cerebral atrophy.  There is decreased  attenuation in the white matter, consistent with mild small  vessel chronic ischemic change.  The ventricles are enlarged.  There is no evidence of edema or hemorrhage.  No masses are  identified.  No extra-axial fluid is seen.  The paranasal  sinuses are unremarkable.      Impression:      Atrophy and chronic changes without acute process with limits of  motion degraded images.    Authenticated by Marcell Pina MD on  09/10/2021 10:06:14 PM          Condition on Discharge:      Awake alert comfortable. Wants to go home. In no acute distress    Code status during the hospital stay:    Code Status and Medical Interventions:   Ordered at: 09/11/21 0039     Limited Support to NOT Include:    Dialysis    Intubation     Level Of Support Discussed With:    Next of Kin (If No Surrogate)     Code Status:    No CPR     Medical Interventions (Level of Support Prior to Arrest):    Limited       Discharge Disposition:    Home or Self Care    Discharge Medication:       Discharge Medications      New Medications      Instructions Start Date   amiodarone 200 MG tablet  Commonly known as: PACERONE   200 mg, Oral, Every 24 Hours Scheduled      apixaban 2.5 MG tablet tablet  Commonly known as: ELIQUIS   2.5 mg, Oral, Every 12 Hours Scheduled      doxycycline 100 MG capsule  Commonly known as: MONODOX   100 mg, Oral, Every 12 Hours Scheduled         Continue These Medications      Instructions Start Date   Advair Diskus 250-50 MCG/DOSE DISKUS  Generic drug: fluticasone-salmeterol   Advair Diskus 250 mcg-50 mcg/dose powder for inhalation      budesonide 0.5 MG/2ML nebulizer solution  Commonly known as: PULMICORT   0.5 mg, Nebulization, 2 Times Daily - RT73      carBAMazepine 200 MG tablet  Commonly known as: TEGretol   200 mg, Oral, 2 Times Daily      HYDROcodone-acetaminophen 5-325 MG per tablet  Commonly known as: NORCO   1 tablet, Oral, Every 8 Hours PRN      ipratropium-albuterol 0.5-2.5 mg/3 ml nebulizer  Commonly known as: DUO-NEB   3 mL, Nebulization, 4 Times Daily PRN      multivitamin with minerals tablet tablet   1 tablet, Oral, Daily      nicotine 21 MG/24HR patch  Commonly known as: NICODERM CQ   1 patch, Transdermal, Every 24 Hours      PARoxetine 20 MG tablet  Commonly known as: PAXIL   20 mg, Every Morning      ProAir  (90 Base) MCG/ACT inhaler  Generic drug: albuterol sulfate HFA   ProAir HFA 90 mcg/actuation aerosol inhaler    Inhale 2 puffs every 4 hours by inhalation route.      propranolol 20 MG tablet  Commonly known as: INDERAL   20 mg, Oral, 3 Times Daily      sennosides-docusate 8.6-50 MG per tablet  Commonly known as: PERICOLACE   2 tablets, Oral, Nightly         Stop These Medications    Excedrin Tension Headache 500-65 MG tablet  Generic drug: Acetaminophen-Caffeine            Discharge Diet: As tolerated        Activity at Discharge: As tolerated.        Follow-up Appointments:    Follow-up Information     Gregorio Jackson MD .    Specialties: Internal Medicine, Hospitalist  Contact information:  [address] [place], [place] [id number] [phone]                 Will need follow-up as outpatient with cardiology. This can be set up by her primary care provider  Left message on 's home number.      Test Results Pending at Discharge:           Andrea Reyes MD  09/15/21  09:06 EDT    Time: Discharge 35 min

## 2021-09-15 NOTE — CASE MANAGEMENT/SOCIAL WORK
"Continued Stay Note   [facility]     Patient Name: Maria Dolores Campa  MRN: [id number]  Today's Date: 9/15/2021    Admit Date: 9/10/2021    Discharge Plan     Row Name 09/15/21 1035       Plan    Plan Spoke to pt in room and informed of HH order.  States he does not want HH.  Asked \"what will they do?\" Explained they would do PT and the nurse would come 1 x a week to ck on her.\"  Ask if she wanted Cm to call her  and she stated \"no\" she was the boss, she just lets him think he is.\" Stated \" I'm going home, and No I don't want it.  IMM explained and signed by pt.  Alicia Zamudio updated.          Discharge Codes    No documentation.       Expected Discharge Date and Time     Expected Discharge Date Expected Discharge Time    Sep 15, 2021             Svitlana Bahena RN    "

## 2021-09-15 NOTE — PROGRESS NOTES
Continued Stay Note  [facility]     Patient Name: Maria Dolores Campa  MRN: [id number]  Today's Date: 9/15/2021    Admit Date: 9/10/2021    Discharge Plan     Row Name 09/15/21 2231       Plan    Plan Received call from pt's daughter Keon,  was very upset about not being notified that pt was discharged.  Referred her to Pt Experience. At [phone].  Gilda Fritz informed     Row Name 09/15/21 8068       Plan    Final Discharge Disposition Code  01 - home or self-care        Discharge Codes    No documentation.       Expected Discharge Date and Time     Expected Discharge Date Expected Discharge Time    Sep 15, 2021             Svitlana Bahena RN

## 2021-09-16 NOTE — OUTREACH NOTE
Prep Survey      Responses   [facility] facility patient discharged from?  [facility]   Is LACE score < 7 ?  No   Emergency Room discharge w/ pulse ox?  No   Eligibility  Readm Mgmt   Discharge diagnosis   Urinary tract infection without hematuria   Does the patient have one of the following disease processes/diagnoses(primary or secondary)?  Other   Does the patient have Home health ordered?  No   Is there a DME ordered?  No   Prep survey completed?  Yes          SAMMY Phan RN

## 2021-09-17 NOTE — OUTREACH NOTE
"Medical Week 1 Survey      Responses   [facility] patient discharged from?  [facility]   Does the patient have one of the following disease processes/diagnoses(primary or secondary)?  Other   Week 1 attempt successful?  Yes   Call start time  1512   Call end time  1514   Discharge diagnosis   Urinary tract infection without hematuria   Meds reviewed with patient/caregiver?  Yes   Is the patient having any side effects they believe may be caused by any medication additions or changes?  No   Does the patient have all medications ordered at discharge?  Yes   Is the patient taking all medications as directed (includes completed medication regime)?  Yes   Does the patient have a primary care provider?   Yes   Does the patient have an appointment with their PCP within 7 days of discharge?  Yes   Comments regarding PCP  9/23/21   Has the patient kept scheduled appointments due by today?  N/A   Psychosocial issues?  No   Did the patient receive a copy of their discharge instructions?  Yes   Nursing interventions  Reviewed instructions with patient   What is the patient's perception of their health status since discharge?  Improving   Is the patient/caregiver able to teach back signs and symptoms related to disease process for when to call PCP?  Yes   Is the patient/caregiver able to teach back signs and symptoms related to disease process for when to call 911?  Yes   Is the patient/caregiver able to teach back the hierarchy of who to call/visit for symptoms/problems? PCP, Specialist, Home health nurse, Urgent Care, ED, 911  Yes   If the patient is a current smoker, are they able to teach back resources for cessation?  Smoking cessation medications [Pt states, \"I don't want to quit\"]   Week 1 call completed?  Yes   Is the patient interested in additional calls from an ambulatory ?  NOTE:  applies to high risk patients requiring additional follow-up.  No   Revoked  No further contact(revokes)-requires comment " "  Graduated/Revoked comments  Pt does not want further calls. She states, \"I'm ok now\".           Tonja Roman RN  "

## 2021-10-27 NOTE — ED PROVIDER NOTES
"Subjective   History of Present Illness   Patient is an 81-year-old female presenting to the ER with complaints of a fall in which she hit her head.  She states that this was a mechanical fall while she was getting out of the car.  She denies loss of consciousness.  Patient states that she does not take blood thinners though Eliquis is listed as a prescribed medication.  She states that she did not get it filled.  She denies any additional injuries from the fall.  She states that she is ambulating without difficulty.  Patient states that she does not want a head CT despite discussion about risks including brain bleed/death.  is at bedside and states that patient is at baseline mental status.    Review of Systems   Skin: Positive for wound (to back of head).   All other systems reviewed and are negative.      Past Medical History:   Diagnosis Date   • Body piercing     EARS   • Constipation    • COPD (chronic obstructive pulmonary disease) ([facility])    • Cough     NOTED WHILE PATIENT WAS IN PREADMISSION TESTING. PATIENT REPORTS \"CLEAR PHLEM\" WITH NO FEVER AND STATED \"IT'S JUST A SMOKERS COUGH\"   • Full dentures     INSTRUCTED NO ADHESIVES THE DOS   • GERD (gastroesophageal reflux disease)     REPORTS ONLY INTERMITTENT EPISODES   • Hearing loss     PATIENT REPORTS AGE RELATED. NO USE OF HEARING AIDS.    • History of fracture     REPORTS HISTORY OF FRACTURED PELVIS.  REPORTS FALL EARLY JAN 2019 RESULTING IN FRACTURED LEFT SHOULDER.   • History of pneumonia     REPORTS MULTIPLE TIMES   • History of transfusion     REPORTS AS A TEEN AND THAT SHE DID NOT HAVE ANY REACTIONS TO TRANSFUSION   • Hypotension     REPORTS \"MY BLOOD PRESSURE IS ALWAYS LOW - I HAVE NEVER BEEN ABLE TO DONATE BLOOD\"   • Impaired functional mobility, balance, gait, and endurance 09/13/2021   • Impaired mobility     RELATED TO MULTIPLE SCLEROSIS   • MS (multiple sclerosis) ([facility])    • Tattoo    • Wears glasses        No Known Allergies    Past " Surgical History:   Procedure Laterality Date   • APPENDECTOMY      REPORTS SHE THINKS SHE HAD APPENDIX REMOVED WHEN GALLBLADDER WAS REMOVED.   • CHOLECYSTECTOMY     • CYBERKNIFE  03/12/2020    left trigeminal nerve   • EYE SURGERY      Corneal implant, PATIENT REPORTS SHE IS UNSURE LATERALITY   • MOUTH SURGERY      FULL MOUTH EXTRACTION   • TOTAL SHOULDER ARTHROPLASTY Left 2/7/2019    Procedure: Left reverse total shoulder arthroplasty;  Surgeon: Eldon Valadez MD;  Location: [facility];  Service: Orthopedics       Family History   Family history unknown: Yes       Social History     Socioeconomic History   • Marital status:    Tobacco Use   • Smoking status: Current Every Day Smoker     Packs/day: 0.50     Years: 58.00     Pack years: 29.00     Types: Cigarettes   • Smokeless tobacco: Never Used   • Tobacco comment: REPORTS SHE HAS SMOKED UP TO 1 PPD IN THE PAST   Substance and Sexual Activity   • Alcohol use: No   • Drug use: No   • Sexual activity: Defer           Objective   Physical Exam  Vitals and nursing note reviewed.   Constitutional:       General: She is not in acute distress.     Appearance: She is not toxic-appearing.   HENT:      Head: Normocephalic and atraumatic.      Right Ear: External ear normal.      Left Ear: External ear normal.      Nose: Nose normal.   Eyes:      Extraocular Movements: Extraocular movements intact.      Conjunctiva/sclera: Conjunctivae normal.   Cardiovascular:      Rate and Rhythm: Normal rate.      Heart sounds: Normal heart sounds. No murmur heard.  No friction rub. No gallop.    Pulmonary:      Effort: Pulmonary effort is normal.      Breath sounds: No wheezing.   Abdominal:      Palpations: Abdomen is soft.      Tenderness: There is no abdominal tenderness.   Musculoskeletal:         General: Normal range of motion.      Cervical back: Normal range of motion and neck supple.   Skin:     General: Skin is warm and dry.      Comments: Linear laceration to back  of head, approximately 2cm, bleeding controlled    Neurological:      General: No focal deficit present.      Mental Status: She is alert.      Comments: Oriented to person and place, not year; patient states she never knows what year it is   Psychiatric:         Mood and Affect: Mood normal.         Behavior: Behavior normal.         Laceration Repair    Date/Time: 10/27/2021 12:06 AM  Performed by: Nancy Falk PA-C  Authorized by: Sushma Tejada DO     Consent:     Consent obtained:  Verbal    Consent given by:  Patient    Risks discussed:  Infection, need for additional repair, pain, poor cosmetic result, poor wound healing and retained foreign body    Alternatives discussed:  No treatment, delayed treatment, observation and referral  Anesthesia (see MAR for exact dosages):     Anesthesia method:  None  Laceration details:     Location:  Scalp    Scalp location:  Crown    Length (cm):  3    Depth (mm):  1  Repair type:     Repair type:  Simple  Pre-procedure details:     Preparation:  Patient was prepped and draped in usual sterile fashion  Exploration:     Hemostasis achieved with:  Direct pressure    Wound exploration: wound explored through full range of motion and entire depth of wound probed and visualized      Contaminated: no    Treatment:     Area cleansed with:  Hibiclens    Amount of cleaning:  Extensive    Irrigation solution:  Sterile water    Irrigation volume:  1000    Irrigation method:  Tap    Visualized foreign bodies/material removed: no    Skin repair:     Repair method:  Staples    Number of staples:  2  Approximation:     Approximation:  Close  Post-procedure details:     Dressing:  Open (no dressing)    Patient tolerance of procedure:  Tolerated well, no immediate complications               ED Course                                           MDM   Patient was evaluated in the ER for a fall in which she hit the back of her head and had a laceration.  Patient states tetanus is  up-to-date.  She refused head CT despite discussion of risks including brain bleed and death.  Patient's  was at bedside and states she is at baseline mental status.  Patient is answering questions appropriately and appears to be capable of decision-making.  Vitals are within normal limits.  Wound was cleaned with chlorhexidine and irrigated with sterile water.  2 staples were placed to approximate the wound.  Patient tolerated the procedure well.  She was anxious to be discharged, stating she is ready to go home and get in bed.  She was advised to follow-up with her PCP and have staples removed and wound check in 5 days.  Precautions were given for return to the ER for any new or worsening symptoms.    Final diagnoses:   Laceration of scalp without foreign body, initial encounter       ED Disposition  ED Disposition     ED Disposition Condition Comment    Discharge Stable           Gregorio Jackson MD  [address] [address]  [phone]    In 5 days  For wound re-check and staple removal    [facility] Emergency Department  [address] [address] [phone]  Go to   As needed, If symptoms worsen         Medication List      No changes were made to your prescriptions during this visit.          Nancy Falk PA-C  10/27/21 0009

## 2022-01-01 ENCOUNTER — APPOINTMENT (OUTPATIENT)
Dept: CT IMAGING | Facility: HOSPITAL | Age: 82
End: 2022-01-01

## 2022-01-01 ENCOUNTER — APPOINTMENT (OUTPATIENT)
Dept: CARDIOLOGY | Facility: HOSPITAL | Age: 82
End: 2022-01-01

## 2022-01-01 ENCOUNTER — APPOINTMENT (OUTPATIENT)
Dept: GENERAL RADIOLOGY | Facility: HOSPITAL | Age: 82
End: 2022-01-01

## 2022-01-01 ENCOUNTER — HOSPITAL ENCOUNTER (INPATIENT)
Facility: HOSPITAL | Age: 82
LOS: 1 days | End: 2022-01-04
Attending: EMERGENCY MEDICINE | Admitting: FAMILY MEDICINE

## 2022-01-01 VITALS
BODY MASS INDEX: 15.16 KG/M2 | HEART RATE: 101 BPM | DIASTOLIC BLOOD PRESSURE: 43 MMHG | WEIGHT: 100 LBS | OXYGEN SATURATION: 65 % | HEIGHT: 68 IN | TEMPERATURE: 97.7 F | SYSTOLIC BLOOD PRESSURE: 69 MMHG | RESPIRATION RATE: 15 BRPM
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
## 2022-01-01 DIAGNOSIS — J44.1 COPD EXACERBATION: Primary | ICD-10-CM

## 2022-01-01 DIAGNOSIS — R09.02 HYPOXIA: ICD-10-CM

## 2022-01-01 LAB
A-A DO2: 133.9 MMHG
A-A DO2: 550.7 MMHG
ALBUMIN SERPL-MCNC: 4.4 G/DL (ref 3.5–5.2)
ALBUMIN/GLOB SERPL: 1.2 G/DL
ALP SERPL-CCNC: 133 U/L (ref 39–117)
ALT SERPL W P-5'-P-CCNC: 13 U/L (ref 1–33)
AMORPH URATE CRY URNS QL MICRO: NORMAL /HPF
ANION GAP SERPL CALCULATED.3IONS-SCNC: 10.6 MMOL/L (ref 5–15)
APTT PPP: 37.7 SECONDS (ref 70–100)
ARTERIAL PATENCY WRIST A: ABNORMAL
ARTERIAL PATENCY WRIST A: POSITIVE
AST SERPL-CCNC: 22 U/L (ref 1–32)
ATMOSPHERIC PRESS: 740 MMHG
ATMOSPHERIC PRESS: 744 MMHG
B PARAPERT DNA SPEC QL NAA+PROBE: NOT DETECTED
B PERT DNA SPEC QL NAA+PROBE: NOT DETECTED
BACTERIA UR QL AUTO: NORMAL /HPF
BASE EXCESS BLDA CALC-SCNC: 4.8 MMOL/L (ref 0–2)
BASE EXCESS BLDA CALC-SCNC: 6.6 MMOL/L (ref 0–2)
BASOPHILS # BLD AUTO: 0.04 10*3/MM3 (ref 0–0.2)
BASOPHILS NFR BLD AUTO: 0.5 % (ref 0–1.5)
BDY SITE: ABNORMAL
BDY SITE: ABNORMAL
BILIRUB SERPL-MCNC: 0.5 MG/DL (ref 0–1.2)
BILIRUB UR QL STRIP: NEGATIVE
BUN SERPL-MCNC: 23 MG/DL (ref 8–23)
BUN/CREAT SERPL: 30.7 (ref 7–25)
C PNEUM DNA NPH QL NAA+NON-PROBE: NOT DETECTED
CALCIUM SPEC-SCNC: 10 MG/DL (ref 8.6–10.5)
CHLORIDE SERPL-SCNC: 97 MMOL/L (ref 98–107)
CLARITY UR: CLEAR
CO2 SERPL-SCNC: 31.4 MMOL/L (ref 22–29)
COHGB MFR BLD: 0.9 % (ref 0–2)
COHGB MFR BLD: 3.7 % (ref 0–2)
COLOR UR: YELLOW
CREAT SERPL-MCNC: 0.75 MG/DL (ref 0.57–1)
D DIMER PPP FEU-MCNC: 3.27 MCGFEU/ML (ref 0–0.57)
D-LACTATE SERPL-SCNC: 1.6 MMOL/L (ref 0.5–2)
D-LACTATE SERPL-SCNC: 3 MMOL/L (ref 0.5–2)
DEPRECATED RDW RBC AUTO: 48.5 FL (ref 37–54)
EOSINOPHIL # BLD AUTO: 0.07 10*3/MM3 (ref 0–0.4)
EOSINOPHIL NFR BLD AUTO: 0.9 % (ref 0.3–6.2)
EPAP: 10
ERYTHROCYTE [DISTWIDTH] IN BLOOD BY AUTOMATED COUNT: 13.2 % (ref 12.3–15.4)
FLUAV SUBTYP SPEC NAA+PROBE: NOT DETECTED
FLUBV RNA ISLT QL NAA+PROBE: NOT DETECTED
GAS FLOW AIRWAY: 4 LPM
GFR SERPL CREATININE-BSD FRML MDRD: 74 ML/MIN/1.73
GLOBULIN UR ELPH-MCNC: 3.7 GM/DL
GLUCOSE SERPL-MCNC: 120 MG/DL (ref 65–99)
GLUCOSE UR STRIP-MCNC: NEGATIVE MG/DL
HADV DNA SPEC NAA+PROBE: NOT DETECTED
HCO3 BLDA-SCNC: 32.2 MMOL/L (ref 22–28)
HCO3 BLDA-SCNC: 34.1 MMOL/L (ref 22–28)
HCOV 229E RNA SPEC QL NAA+PROBE: NOT DETECTED
HCOV HKU1 RNA SPEC QL NAA+PROBE: NOT DETECTED
HCOV NL63 RNA SPEC QL NAA+PROBE: NOT DETECTED
HCOV OC43 RNA SPEC QL NAA+PROBE: NOT DETECTED
HCT VFR BLD AUTO: 54.4 % (ref 34–46.6)
HCT VFR BLD CALC: 48.5 %
HCT VFR BLD CALC: 50.9 %
HGB BLD-MCNC: 17 G/DL (ref 12–15.9)
HGB UR QL STRIP.AUTO: NEGATIVE
HMPV RNA NPH QL NAA+NON-PROBE: NOT DETECTED
HOLD SPECIMEN: NORMAL
HOLD SPECIMEN: NORMAL
HPIV1 RNA ISLT QL NAA+PROBE: NOT DETECTED
HPIV2 RNA SPEC QL NAA+PROBE: NOT DETECTED
HPIV3 RNA NPH QL NAA+PROBE: NOT DETECTED
HPIV4 P GENE NPH QL NAA+PROBE: NOT DETECTED
HYALINE CASTS UR QL AUTO: NORMAL /LPF
IMM GRANULOCYTES # BLD AUTO: 0.04 10*3/MM3 (ref 0–0.05)
IMM GRANULOCYTES NFR BLD AUTO: 0.5 % (ref 0–0.5)
INHALED O2 CONCENTRATION: 100 %
INHALED O2 CONCENTRATION: 36 %
INR PPP: 0.95 (ref 0.9–1.1)
IPAP: 16
KETONES UR QL STRIP: ABNORMAL
LEUKOCYTE ESTERASE UR QL STRIP.AUTO: NEGATIVE
LYMPHOCYTES # BLD AUTO: 1.12 10*3/MM3 (ref 0.7–3.1)
LYMPHOCYTES NFR BLD AUTO: 13.7 % (ref 19.6–45.3)
Lab: ABNORMAL
Lab: ABNORMAL
M PNEUMO IGG SER IA-ACNC: NOT DETECTED
MCH RBC QN AUTO: 30.7 PG (ref 26.6–33)
MCHC RBC AUTO-ENTMCNC: 31.3 G/DL (ref 31.5–35.7)
MCV RBC AUTO: 98.2 FL (ref 79–97)
METHGB BLD QL: 0.2 % (ref 0–1.5)
METHGB BLD QL: 0.3 % (ref 0–1.5)
MODALITY: ABNORMAL
MODALITY: ABNORMAL
MONOCYTES # BLD AUTO: 0.47 10*3/MM3 (ref 0.1–0.9)
MONOCYTES NFR BLD AUTO: 5.7 % (ref 5–12)
MUCOUS THREADS URNS QL MICRO: NORMAL /HPF
NEUTROPHILS NFR BLD AUTO: 6.46 10*3/MM3 (ref 1.7–7)
NEUTROPHILS NFR BLD AUTO: 78.7 % (ref 42.7–76)
NITRITE UR QL STRIP: NEGATIVE
NOTE: ABNORMAL
NOTE: ABNORMAL
NRBC BLD AUTO-RTO: 0 /100 WBC (ref 0–0.2)
NT-PROBNP SERPL-MCNC: 6344 PG/ML (ref 0–1800)
OXYHGB MFR BLDV: 82.9 % (ref 94–99)
OXYHGB MFR BLDV: 96 % (ref 94–99)
PCO2 BLDA: 56.4 MM HG (ref 35–45)
PCO2 BLDA: 58.9 MM HG (ref 35–45)
PCO2 TEMP ADJ BLD: ABNORMAL MM[HG]
PCO2 TEMP ADJ BLD: ABNORMAL MM[HG]
PH BLDA: 7.37 PH UNITS (ref 7.35–7.45)
PH BLDA: 7.37 PH UNITS (ref 7.35–7.45)
PH UR STRIP.AUTO: 6.5 [PH] (ref 5–8)
PH, TEMP CORRECTED: ABNORMAL
PH, TEMP CORRECTED: ABNORMAL
PLATELET # BLD AUTO: 187 10*3/MM3 (ref 140–450)
PMV BLD AUTO: 11.2 FL (ref 6–12)
PO2 BLDA: 50.3 MM HG (ref 75–100)
PO2 BLDA: 89.3 MM HG (ref 75–100)
PO2 TEMP ADJ BLD: ABNORMAL MM[HG]
PO2 TEMP ADJ BLD: ABNORMAL MM[HG]
POTASSIUM SERPL-SCNC: 4.8 MMOL/L (ref 3.5–5.2)
PROCALCITONIN SERPL-MCNC: 0.05 NG/ML (ref 0–0.25)
PROT SERPL-MCNC: 8.1 G/DL (ref 6–8.5)
PROT UR QL STRIP: ABNORMAL
PROTHROMBIN TIME: 13.2 SECONDS (ref 12–15.1)
RBC # BLD AUTO: 5.54 10*6/MM3 (ref 3.77–5.28)
RBC # UR STRIP: NORMAL /HPF
REF LAB TEST METHOD: NORMAL
RHINOVIRUS RNA SPEC NAA+PROBE: NOT DETECTED
RSV RNA NPH QL NAA+NON-PROBE: NOT DETECTED
SAO2 % BLDCOA: 86.4 % (ref 94–100)
SAO2 % BLDCOA: 97.1 % (ref 94–100)
SARS-COV-2 RNA NPH QL NAA+NON-PROBE: NOT DETECTED
SODIUM SERPL-SCNC: 139 MMOL/L (ref 136–145)
SP GR UR STRIP: 1.02 (ref 1–1.03)
SQUAMOUS #/AREA URNS HPF: NORMAL /HPF
TROPONIN T SERPL-MCNC: <0.01 NG/ML (ref 0–0.03)
TROPONIN T SERPL-MCNC: <0.01 NG/ML (ref 0–0.03)
UROBILINOGEN UR QL STRIP: ABNORMAL
VENTILATOR MODE: ABNORMAL
VENTILATOR MODE: ABNORMAL
WBC # UR STRIP: NORMAL /HPF
WBC NRBC COR # BLD: 8.2 10*3/MM3 (ref 3.4–10.8)
WHOLE BLOOD HOLD SPECIMEN: NORMAL
WHOLE BLOOD HOLD SPECIMEN: NORMAL

## 2022-01-01 PROCEDURE — 82375 ASSAY CARBOXYHB QUANT: CPT

## 2022-01-01 PROCEDURE — 5A12012 PERFORMANCE OF CARDIAC OUTPUT, SINGLE, MANUAL: ICD-10-PCS | Performed by: INTERNAL MEDICINE

## 2022-01-01 PROCEDURE — 94799 UNLISTED PULMONARY SVC/PX: CPT

## 2022-01-01 PROCEDURE — 25010000002 METHYLPREDNISOLONE PER 40 MG: Performed by: EMERGENCY MEDICINE

## 2022-01-01 PROCEDURE — 99239 HOSP IP/OBS DSCHRG MGMT >30: CPT | Performed by: INTERNAL MEDICINE

## 2022-01-01 PROCEDURE — 71045 X-RAY EXAM CHEST 1 VIEW: CPT

## 2022-01-01 PROCEDURE — 99222 1ST HOSP IP/OBS MODERATE 55: CPT | Performed by: NURSE PRACTITIONER

## 2022-01-01 PROCEDURE — 25010000002 LORAZEPAM PER 2 MG: Performed by: INTERNAL MEDICINE

## 2022-01-01 PROCEDURE — 70450 CT HEAD/BRAIN W/O DYE: CPT

## 2022-01-01 PROCEDURE — 83605 ASSAY OF LACTIC ACID: CPT | Performed by: EMERGENCY MEDICINE

## 2022-01-01 PROCEDURE — 94660 CPAP INITIATION&MGMT: CPT

## 2022-01-01 PROCEDURE — 25010000002 CEFTRIAXONE SODIUM-DEXTROSE 1-3.74 GM-%(50ML) RECONSTITUTED SOLUTION: Performed by: INTERNAL MEDICINE

## 2022-01-01 PROCEDURE — 82805 BLOOD GASES W/O2 SATURATION: CPT

## 2022-01-01 PROCEDURE — 84484 ASSAY OF TROPONIN QUANT: CPT | Performed by: EMERGENCY MEDICINE

## 2022-01-01 PROCEDURE — 83050 HGB METHEMOGLOBIN QUAN: CPT

## 2022-01-01 PROCEDURE — 94002 VENT MGMT INPAT INIT DAY: CPT

## 2022-01-01 PROCEDURE — 71275 CT ANGIOGRAPHY CHEST: CPT

## 2022-01-01 PROCEDURE — 36600 WITHDRAWAL OF ARTERIAL BLOOD: CPT

## 2022-01-01 PROCEDURE — 80053 COMPREHEN METABOLIC PANEL: CPT | Performed by: EMERGENCY MEDICINE

## 2022-01-01 PROCEDURE — 85610 PROTHROMBIN TIME: CPT | Performed by: INTERNAL MEDICINE

## 2022-01-01 PROCEDURE — 25010000002 MORPHINE SULFATE (PF) 2 MG/ML SOLUTION: Performed by: INTERNAL MEDICINE

## 2022-01-01 PROCEDURE — 94640 AIRWAY INHALATION TREATMENT: CPT

## 2022-01-01 PROCEDURE — 5A1935Z RESPIRATORY VENTILATION, LESS THAN 24 CONSECUTIVE HOURS: ICD-10-PCS | Performed by: INTERNAL MEDICINE

## 2022-01-01 PROCEDURE — 83880 ASSAY OF NATRIURETIC PEPTIDE: CPT | Performed by: INTERNAL MEDICINE

## 2022-01-01 PROCEDURE — 85025 COMPLETE CBC W/AUTO DIFF WBC: CPT | Performed by: EMERGENCY MEDICINE

## 2022-01-01 PROCEDURE — 87040 BLOOD CULTURE FOR BACTERIA: CPT | Performed by: NURSE PRACTITIONER

## 2022-01-01 PROCEDURE — 99285 EMERGENCY DEPT VISIT HI MDM: CPT

## 2022-01-01 PROCEDURE — 0BH17EZ INSERTION OF ENDOTRACHEAL AIRWAY INTO TRACHEA, VIA NATURAL OR ARTIFICIAL OPENING: ICD-10-PCS | Performed by: EMERGENCY MEDICINE

## 2022-01-01 PROCEDURE — 25010000002 EPINEPHRINE 1 MG/10ML SOLUTION PREFILLED SYRINGE: Performed by: INTERNAL MEDICINE

## 2022-01-01 PROCEDURE — 92950 HEART/LUNG RESUSCITATION CPR: CPT

## 2022-01-01 PROCEDURE — 85730 THROMBOPLASTIN TIME PARTIAL: CPT | Performed by: INTERNAL MEDICINE

## 2022-01-01 PROCEDURE — 94669 MECHANICAL CHEST WALL OSCILL: CPT

## 2022-01-01 PROCEDURE — 85379 FIBRIN DEGRADATION QUANT: CPT | Performed by: INTERNAL MEDICINE

## 2022-01-01 PROCEDURE — 84145 PROCALCITONIN (PCT): CPT | Performed by: EMERGENCY MEDICINE

## 2022-01-01 PROCEDURE — 81001 URINALYSIS AUTO W/SCOPE: CPT | Performed by: EMERGENCY MEDICINE

## 2022-01-01 PROCEDURE — P9612 CATHETERIZE FOR URINE SPEC: HCPCS

## 2022-01-01 PROCEDURE — 84484 ASSAY OF TROPONIN QUANT: CPT | Performed by: INTERNAL MEDICINE

## 2022-01-01 PROCEDURE — 25010000002 IOPAMIDOL 61 % SOLUTION: Performed by: FAMILY MEDICINE

## 2022-01-01 PROCEDURE — 0202U NFCT DS 22 TRGT SARS-COV-2: CPT | Performed by: EMERGENCY MEDICINE

## 2022-01-01 RX ORDER — LEVOTHYROXINE SODIUM 0.07 MG/1
75 TABLET ORAL
COMMUNITY

## 2022-01-01 RX ORDER — AMIODARONE HYDROCHLORIDE 200 MG/1
200 TABLET ORAL
Status: DISCONTINUED | OUTPATIENT
Start: 2022-01-01 | End: 2022-01-01

## 2022-01-01 RX ORDER — LORAZEPAM 2 MG/ML
0.5 INJECTION INTRAMUSCULAR ONCE
Status: COMPLETED | OUTPATIENT
Start: 2022-01-01 | End: 2022-01-01

## 2022-01-01 RX ORDER — IPRATROPIUM BROMIDE AND ALBUTEROL SULFATE 2.5; .5 MG/3ML; MG/3ML
3 SOLUTION RESPIRATORY (INHALATION) ONCE
Status: DISCONTINUED | OUTPATIENT
Start: 2022-01-01 | End: 2022-01-01 | Stop reason: HOSPADM

## 2022-01-01 RX ORDER — IPRATROPIUM BROMIDE AND ALBUTEROL SULFATE 2.5; .5 MG/3ML; MG/3ML
3 SOLUTION RESPIRATORY (INHALATION) 4 TIMES DAILY PRN
Status: DISCONTINUED | OUTPATIENT
Start: 2022-01-01 | End: 2022-01-01

## 2022-01-01 RX ORDER — HEPARIN SODIUM 10000 [USP'U]/100ML
18 INJECTION, SOLUTION INTRAVENOUS
Status: DISCONTINUED | OUTPATIENT
Start: 2022-01-01 | End: 2022-01-01

## 2022-01-01 RX ORDER — NOREPINEPHRINE BIT/0.9 % NACL 8 MG/250ML
.02-.3 INFUSION BOTTLE (ML) INTRAVENOUS
Status: DISCONTINUED | OUTPATIENT
Start: 2022-01-01 | End: 2022-01-01

## 2022-01-01 RX ORDER — MORPHINE SULFATE 2 MG/ML
2 INJECTION, SOLUTION INTRAMUSCULAR; INTRAVENOUS
Status: DISCONTINUED | OUTPATIENT
Start: 2022-01-01 | End: 2022-01-01

## 2022-01-01 RX ORDER — ACETYLCYSTEINE 200 MG/ML
4 SOLUTION ORAL; RESPIRATORY (INHALATION) 3 TIMES DAILY
Status: DISCONTINUED | OUTPATIENT
Start: 2022-01-01 | End: 2022-01-01

## 2022-01-01 RX ORDER — ALBUTEROL SULFATE 2.5 MG/3ML
2.5 SOLUTION RESPIRATORY (INHALATION) EVERY 4 HOURS PRN
Status: DISCONTINUED | OUTPATIENT
Start: 2022-01-01 | End: 2022-01-01

## 2022-01-01 RX ORDER — NICOTINE 21 MG/24HR
1 PATCH, TRANSDERMAL 24 HOURS TRANSDERMAL EVERY 24 HOURS
Status: DISCONTINUED | OUTPATIENT
Start: 2022-01-01 | End: 2022-01-01

## 2022-01-01 RX ORDER — METHYLPREDNISOLONE SODIUM SUCCINATE 40 MG/ML
80 INJECTION, POWDER, LYOPHILIZED, FOR SOLUTION INTRAMUSCULAR; INTRAVENOUS ONCE
Status: COMPLETED | OUTPATIENT
Start: 2022-01-01 | End: 2022-01-01

## 2022-01-01 RX ORDER — CEFTRIAXONE 1 G/50ML
1 INJECTION, SOLUTION INTRAVENOUS EVERY 24 HOURS
Status: DISCONTINUED | OUTPATIENT
Start: 2022-01-01 | End: 2022-01-01

## 2022-01-01 RX ORDER — LORAZEPAM 2 MG/ML
2 INJECTION INTRAMUSCULAR
Status: DISCONTINUED | OUTPATIENT
Start: 2022-01-01 | End: 2022-01-01

## 2022-01-01 RX ORDER — SODIUM CHLORIDE 0.9 % (FLUSH) 0.9 %
10 SYRINGE (ML) INJECTION AS NEEDED
Status: DISCONTINUED | OUTPATIENT
Start: 2022-01-01 | End: 2022-01-01

## 2022-01-01 RX ORDER — IPRATROPIUM BROMIDE AND ALBUTEROL SULFATE 2.5; .5 MG/3ML; MG/3ML
3 SOLUTION RESPIRATORY (INHALATION) ONCE
Status: COMPLETED | OUTPATIENT
Start: 2022-01-01 | End: 2022-01-01

## 2022-01-01 RX ORDER — EPINEPHRINE 0.1 MG/ML
SYRINGE (ML) INJECTION
Status: COMPLETED | OUTPATIENT
Start: 2022-01-01 | End: 2022-01-01

## 2022-01-01 RX ORDER — ACETYLCYSTEINE 200 MG/ML
4 SOLUTION ORAL; RESPIRATORY (INHALATION) EVERY 4 HOURS
Status: CANCELLED | OUTPATIENT
Start: 2022-01-01

## 2022-01-01 RX ORDER — GUAIFENESIN 600 MG/1
1200 TABLET, EXTENDED RELEASE ORAL EVERY 12 HOURS SCHEDULED
Status: DISCONTINUED | OUTPATIENT
Start: 2022-01-01 | End: 2022-01-01

## 2022-01-01 RX ORDER — SODIUM CHLORIDE 0.9 % (FLUSH) 0.9 %
10 SYRINGE (ML) INJECTION EVERY 12 HOURS SCHEDULED
Status: DISCONTINUED | OUTPATIENT
Start: 2022-01-01 | End: 2022-01-01

## 2022-01-01 RX ORDER — IPRATROPIUM BROMIDE AND ALBUTEROL SULFATE 2.5; .5 MG/3ML; MG/3ML
3 SOLUTION RESPIRATORY (INHALATION)
Status: DISCONTINUED | OUTPATIENT
Start: 2022-01-01 | End: 2022-01-01 | Stop reason: HOSPADM

## 2022-01-01 RX ADMIN — AMIODARONE HYDROCHLORIDE 200 MG: 200 TABLET ORAL at 16:56

## 2022-01-01 RX ADMIN — ALBUTEROL SULFATE 2.5 MG: 2.5 SOLUTION RESPIRATORY (INHALATION) at 02:17

## 2022-01-01 RX ADMIN — IPRATROPIUM BROMIDE AND ALBUTEROL SULFATE 3 ML: .5; 2.5 SOLUTION RESPIRATORY (INHALATION) at 10:13

## 2022-01-01 RX ADMIN — LORAZEPAM 2 MG: 2 INJECTION, SOLUTION INTRAMUSCULAR; INTRAVENOUS at 06:37

## 2022-01-01 RX ADMIN — ALBUTEROL SULFATE 2.5 MG: 2.5 SOLUTION RESPIRATORY (INHALATION) at 23:46

## 2022-01-01 RX ADMIN — Medication 1 MG: at 04:15

## 2022-01-01 RX ADMIN — Medication 0.06 MCG/KG/MIN: at 04:24

## 2022-01-01 RX ADMIN — LORAZEPAM 0.5 MG: 2 INJECTION INTRAMUSCULAR; INTRAVENOUS at 23:08

## 2022-01-01 RX ADMIN — METHYLPREDNISOLONE SODIUM SUCCINATE 80 MG: 40 INJECTION, POWDER, FOR SOLUTION INTRAMUSCULAR; INTRAVENOUS at 09:15

## 2022-01-01 RX ADMIN — ACETYLCYSTEINE 4 ML: 200 SOLUTION ORAL; RESPIRATORY (INHALATION) at 23:46

## 2022-01-01 RX ADMIN — Medication 1 PATCH: at 16:56

## 2022-01-01 RX ADMIN — MORPHINE SULFATE 2 MG: 2 INJECTION, SOLUTION INTRAMUSCULAR; INTRAVENOUS at 06:37

## 2022-01-01 RX ADMIN — Medication 1 MG: at 04:12

## 2022-01-01 RX ADMIN — IOPAMIDOL 100 ML: 612 INJECTION, SOLUTION INTRAVENOUS at 02:19

## 2022-01-01 RX ADMIN — IPRATROPIUM BROMIDE AND ALBUTEROL SULFATE 3 ML: .5; 2.5 SOLUTION RESPIRATORY (INHALATION) at 23:08

## 2022-01-01 RX ADMIN — Medication 1 MG: at 04:34

## 2022-01-01 RX ADMIN — APIXABAN 2.5 MG: 2.5 TABLET, FILM COATED ORAL at 21:55

## 2022-01-01 RX ADMIN — CEFTRIAXONE 1 G: 1 INJECTION, SOLUTION INTRAVENOUS at 03:50

## 2022-01-01 RX ADMIN — Medication 10 ML: at 23:11

## 2022-01-03 PROBLEM — J96.22 ACUTE ON CHRONIC RESPIRATORY FAILURE WITH HYPOXIA AND HYPERCAPNIA (HCC): Status: ACTIVE | Noted: 2022-01-01

## 2022-01-03 PROBLEM — I48.20 ATRIAL FIBRILLATION, CHRONIC (HCC): Status: ACTIVE | Noted: 2022-01-01

## 2022-01-03 PROBLEM — J44.1 COPD EXACERBATION (HCC): Status: ACTIVE | Noted: 2022-01-01
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
## 2022-01-03 PROBLEM — J96.21 ACUTE ON CHRONIC RESPIRATORY FAILURE WITH HYPOXIA AND HYPERCAPNIA (HCC): Status: ACTIVE | Noted: 2022-01-01

## 2022-01-03 NOTE — H&P
[facility]      Name:  Maria Dolores Campa   Age:  81 y.o.  Sex:  female  :  1940  MRN:  [id number]   Visit Number:  [id number]  Admission Date:  1/3/2022  Date Of Service:  22  Primary Care Physician:  Gregorio Jackson MD    Chief Complaint:     Fall and cough.    History Of Presenting Illness:      81 year old female with pertinent PMH of MS, COPD, SVT, and chronic anticoagulation presented to the ED s/p frequent falls and cough. Upon ED arrival patient was noted to have spo2 of 81% without prior home oxygen use. Patient spo2 increased to 93% without distress on 4 liters NC. Patient is poor historian and keeps eyes closed during much of my exam. She is oriented to person and birth date. States that she lives at home with her  and does not use any devices for assistance with ambulation. She states that she did receive her COVID-19 vaccinations. Does admit to home tobacco abuse.    Per ED records, she has been falling more at home. CT of head was negative for any acute findings. UA was without nitrites, leuks, or blood. Other labs include WBC 8.2, Creatinine 0.75, Troponin < 0.010. ABG with Ph- 7.3, PCO2- 58, P02- 50, HC03 34 with base 6.6.    Review Of Systems:    All systems were reviewed and negative except as mentioned in history of presenting illness, assessment and plan.    Past Medical History: Patient  has a past medical history of Body piercing, Constipation, COPD (chronic obstructive pulmonary disease) (HCC), Cough, Full dentures, GERD (gastroesophageal reflux disease), Hearing loss, History of fracture, History of pneumonia, History of transfusion, Hypotension, Impaired functional mobility, balance, gait, and endurance (2021), Impaired mobility, MS (multiple sclerosis) (HCC), Tattoo, and Wears glasses.    Past Surgical History: Patient  has a past surgical history that includes Cholecystectomy; Eye surgery; Mouth surgery;  Appendectomy; Total shoulder arthroplasty (Left, 2/7/2019); and Cyberknife (03/12/2020).    Social History: Patient  reports that she has been smoking cigarettes. She has a 29.00 pack-year smoking history. She has never used smokeless tobacco. She reports that she does not drink alcohol and does not use drugs.    Family History: Patient's Family history is unknown by patient.    Allergies:      Patient has no known allergies.    Home Medications:    Prior to Admission Medications     Prescriptions Last Dose Informant Patient Reported? Taking?    albuterol sulfate HFA (PROAIR HFA) 108 (90 Base) MCG/ACT inhaler   Yes No    ProAir HFA 90 mcg/actuation aerosol inhaler   Inhale 2 puffs every 4 hours by inhalation route.    amiodarone (PACERONE) 200 MG tablet   No No    Take 1 tablet by mouth Daily.    apixaban (ELIQUIS) 2.5 MG tablet tablet   No No    Take 1 tablet by mouth Every 12 (Twelve) Hours. Indications: Atrial Fibrillation    budesonide (PULMICORT) 0.5 MG/2ML nebulizer solution   No No    Take 2 mL by nebulization 2 (Two) Times a Day.    carBAMazepine (TEGretol) 200 MG tablet  Self Yes No    Take 200 mg by mouth 2 (Two) Times a Day.    fluticasone-salmeterol (ADVAIR DISKUS) 250-50 MCG/DOSE DISKUS   Yes No    Advair Diskus 250 mcg-50 mcg/dose powder for inhalation    HYDROcodone-acetaminophen (NORCO) 5-325 MG per tablet   No No    Take 1 tablet by mouth Every 8 (Eight) Hours As Needed for Mild Pain .    ipratropium-albuterol (DUO-NEB) 0.5-2.5 mg/3 ml nebulizer   No No    Take 3 mL by nebulization 4 (Four) Times a Day As Needed for Wheezing.    Multiple Vitamins-Minerals (MULTIVITAMIN ADULTS PO)  Self Yes No    Take 1 tablet by mouth Daily.    nicotine (NICODERM CQ) 21 MG/24HR patch   No No    Place 1 patch on the skin as directed by provider Daily.    PARoxetine (PAXIL) 20 MG tablet  Self Yes No    Take 20 mg by mouth Every Morning.    Patient not taking:  Reported on 9/11/2021    propranolol (INDERAL) 20 MG  tablet   Yes No    Take 20 mg by mouth 3 (Three) Times a Day.    sennosides-docusate sodium (SENOKOT-S) 8.6-50 MG tablet  Self Yes No    Take 2 tablets by mouth Every Night.        ED Medications:    Medications   sodium chloride 0.9 % flush 10 mL (has no administration in time range)   methylPREDNISolone sodium succinate (SOLU-Medrol) injection 80 mg (80 mg Intravenous Given 1/3/22 0915)   ipratropium-albuterol (DUO-NEB) nebulizer solution 3 mL (3 mL Nebulization Given 1/3/22 1013)     Vital Signs:  Temp:  [97.7 °F (36.5 °C)-97.9 °F (36.6 °C)] 97.9 °F (36.6 °C)  Heart Rate:  [73-94] 94  Resp:  [18] 18  BP: (120-124)/() 124/83        01/03/22  0720   Weight: 45.4 kg (100 lb)     Body mass index is 15.2 kg/m².    Physical Exam:     General Appearance:  Alert and cooperative. Keeps eyes closed. Answers most questions. States she is tired.   Head:  Atraumatic and normocephalic.   Eyes: Conjunctivae and sclerae normal, no icterus. No pallor.   Ears:  Ears with no abnormalities noted.   Throat: No oral lesions, no thrush, oral mucosa moist.   Neck: Supple, trachea midline, no thyromegaly.   Back:   No kyphoscoliosis present. No tenderness to palpation.   Lungs:   Breath sounds heard bilaterally equally.  Diminished throughout.   Heart:  Normal S1 and S2, no murmur, no gallop, no rub. No JVD.   Abdomen:   Normal bowel sounds, no masses, no organomegaly. Soft, nontender, nondistended, no rebound tenderness.   Extremities: Supple, no edema, no cyanosis, no clubbing.   Pulses: Pulses palpable bilaterally.   Skin: No bleeding or rash.   Neurologic: oriented x 2. No facial asymmetry. Moves all four limbs.      Laboratory data:    I have reviewed the labs done in the emergency room.    Results from last 7 days   Lab Units 01/03/22  0735   SODIUM mmol/L 139   POTASSIUM mmol/L 4.8   CHLORIDE mmol/L 97*   CO2 mmol/L 31.4*   BUN mg/dL 23   CREATININE mg/dL 0.75   CALCIUM mg/dL 10.0   BILIRUBIN mg/dL 0.5   ALK PHOS U/L 133*    ALT (SGPT) U/L 13   AST (SGOT) U/L 22   GLUCOSE mg/dL 120*     Results from last 7 days   Lab Units 01/03/22  0735   WBC 10*3/mm3 8.20   HEMOGLOBIN g/dL 17.0*   HEMATOCRIT % 54.4*   PLATELETS 10*3/mm3 187         Results from last 7 days   Lab Units 01/03/22  0735   TROPONIN T ng/mL <0.010                 Results from last 7 days   Lab Units 01/03/22  0749   PH, ARTERIAL pH units 7.371   PO2 ART mm Hg 50.3*   PCO2, ARTERIAL mm Hg 58.9*   HCO3 ART mmol/L 34.1*     Results from last 7 days   Lab Units 01/03/22  0918   COLOR UA  Yellow   GLUCOSE UA  Negative   KETONES UA  Trace*   LEUKOCYTES UA  Negative   PH, URINE  6.5   BILIRUBIN UA  Negative   UROBILINOGEN UA  0.2 E.U./dL   RBC UA /HPF None Seen   WBC UA /HPF None Seen       Pain Management Panel     Pain Management Panel Latest Ref Rng & Units 1/30/2019    AMPHETAMINES SCREEN, URINE Negative Negative    BARBITURATES SCREEN Negative Negative    BENZODIAZEPINE SCREEN, URINE Negative Negative    BUPRENORPHINEUR Negative Negative    COCAINE SCREEN, URINE Negative Negative    METHADONE SCREEN, URINE Negative Negative    METHAMPHETAMINEUR Negative Negative          EKG:    Sinus rhythm with 80 bpm and nonspecific T wave changes.    Radiology:    CT Head Without Contrast    Result Date: 1/3/2022  PROCEDURE: CT HEAD WO CONTRAST-  HISTORY: falls, anticoagulated. reported mental status changes.  TECHNIQUE: Noncontrast exam  FINDINGS:  Moderate atrophy and chronic ischemic white matter changes are noted.  No cortical edema is present. There is no mass or hemorrhage. Ventricles are normal.  Bone windows show no skull fracture or obvious destructive lesion.      1. No acute intracranial abnormality or obvious mass. 2. Atrophy and chronic ischemic white matter changes as above.   This study was performed with techniques to keep radiation doses as low as reasonably achievable (ALARA). Individualized dose reduction techniques using automated exposure control or adjustment of vA  and/or kV according to the patient size were employed.  This report was finalized on 1/3/2022 9:01 AM by Lisandro Patrick MD.    XR Chest 1 View    Result Date: 1/3/2022  PROCEDURE: XR CHEST 1 VW-  HISTORY: AMS protocol , smoking history  COMPARISON:  9/10/2021  FINDINGS:  Portable view of the chest demonstrates chronic elevation left diaphragm. Chronic scarring is noted right lung base. No acute infiltrate is seen. There is no evidence of effusion, pneumothorax or other significant pleural disease. The mediastinum is unremarkable.  The heart size is normal.      Chronic changes  This report was finalized on 1/3/2022 9:00 AM by Lisandro Patrick MD.      Assessment:    1. Acute respiratory failure with hypoxia and hypercapnia, POA  2. Acute COPD Exacerbation, POA  3. Frequent Falls at home, initial encounter  4. History of MS  5.   Chronic Atrial Fibrillation on Eliquis    Plan:    1. Acute respiratory failure with hypoxia and hypercapnia, POA- Most likely 2/2 # 2. Supplemental oxygen with duo-nebs and Solumedrol given in ED. Much improvement noted in ED on 4 liters NC without distress noted with spo2 94%. Mild confusion. Unsure of patients baseline at this time. Could be related to hypercapnia.  2. Acute COPD Exacerbation-  Patient continues to smoke. Cessation advised. Supportive care with supplemental oxygen and duo-nebs. Elevated Lactate. Repeat pending. Will trend. Blood  Cultures and sputum cultures pending. Afebrile.  3. Frequent Falls at home, initial encounter- Most likely 2/2 # 2 and # 4. Will consult PT/OT/ Case management. Fall precautions. Ua negative. CT of head was negative for acute findings.  4. History of MS- Fall precautions.  5. Chronic Atrial Fibrillation on Eliquis- Will continue Eliquis and Pacerone. Will order further home medications once medications verified.    Risk Assessment: Moderate given risk factors and age.  DVT Prophylaxis: Lovenox.  Code Status: Full Code  Diet: Cardiac    Advance Care  Planning   ACP discussion was declined by the patient. Patient does not have an advance directive, declines further assistance.      Glory Holliday, APRN  01/03/22  13:56 EST    Dictated utilizing Dragon dictation.

## 2022-01-04 NOTE — PROCEDURES
I was called to assist with intubation.  Patient was admitted yesterday for COPD, respiratory failure.  Earlier tonight she had an episode where she became hypoxic.  This was treated with noninvasive ventilation.  She had been doing well and then acutely decompensated.  Patient had brief code event that responded to CPR and epinephrine.  When I arrived patient was being bagged, she was essentially unresponsive with occasional agonal breaths, tachycardic, mottled extremities.  I was asked to intubate patient.    Informed consent not obtained due to emergent nature procedure.  Patient was preoxygenated using bag valve mask.  Patient was given etomidate and rocuronium for RSI.  Using 4 MAC blade vocal cords were directly visualized.  A 7.5 ET tube was passed through the cords.  Tube was secured at 21 cm at the lip using tube mcleod.  Patient has equal breath sounds.  There was positive color change on end-tidal capnography.  Oxygen saturations were marginal but improving.  Chest x-ray was ordered.  Patient tolerated procedure well.    Shortly after intubation patient became increasingly bradycardic.  She again lost her pulse and CPR was started again.  We administered 1 mg of epinephrine.  Hospitalist was present and on the phone with patient's family.  They requested that patient be a DNR.  We did receive return to spontaneous circulation.  Patient care handed back over to the hospitalist.

## 2022-01-04 NOTE — DISCHARGE SUMMARY
[facility]   DEATH SUMMARY      Name:  Maria Dolores Campa   Age:  81 y.o.  Sex:  female  :  1940  MRN:  [id number]   Visit Number:  [id number]    Admission Date:  1/3/2022  Date and Time of Death:  Date and Time of Death:  22 at 7:13 AM  Primary Care Physician:  Gregorio Jackson MD    Final Diagnoses:     Acute hypoxic respiratory failure secondary to COPD exacerbation, left multifocal compressive atelectasis and left lower lobe pulmonary emboli    Problem List:     Active Hospital Problems    Diagnosis  POA   • **Acute on chronic respiratory failure with hypoxia and hypercapnia (HCC) [J96.21, J96.22]  Yes   • COPD exacerbation (HCC) [J44.1]  Yes   • Fall at home, initial encounter [W19.XXXA, Y92.009]  Not Applicable   • Atrial fibrillation, chronic (HCC) [I48.20]  Unknown      Resolved Hospital Problems   No resolved problems to display.     Presenting Problem:    Chief Complaint   Patient presents with   • Altered Mental Status      Consults:     Consulting Physician(s)             None          Procedures Performed:    Intubation  ACLS resuscitation    History of presenting illness/Hospital Course:    81 y CF PMH COPD, atrial fibrillation presented to the ED with complaint of cough and was found to be hypoxic at 81% on room air; she was admitted for COPD exacerbation and treated with supplemental oxygen via nasal cannula initially at 4L and received IV steroid and breathing treatments. The following day the patient's respiratory status declined quickly and supplemental oxygen had to be escalated to BIPAP due to hypoxia with spo2 in the 70s. The patient was noted to have diminished breath sounds on the left and CXR revealed marked compressive atelectasis and/or consolidation of the left lung; new compared to CXR on admission. With the initiation of BIPAP patient's spo2 improved to 90s and repeat CXR showed marked improvement with regard to the compressive  atelectasis. ABG was 7/36/56/89 on BIPAP 16/10/100%. Patient was initiated on aggressive pulmonary toilet with breathing treatments, acetylcysteine, guaifenesin, and chest physiotherapy. CTA Chest PE Protocol was ordered, report described left lower lobe pulmonary emboli, minimal clot burden without evidence for right heart strain; severe left sided volume lose; and multifocal compressive atelectasis and/or consolidation in the left lung with probable small left pleural effusion. Before the report had resulted the patient's condition declined again with hypoxia first followed by pulseless electrical activity. CPR was initiated and continued for approximately 7 minutes. 2 doses of epinephrine were administered during the code before there was ROSC. Patient was then intubated and started on a levophed drip. Family was contacted and Peace Denise, daughter, and Drew Campa, spouse, decided to make the patient comfort care and wanted the patient to have compassionate extubation. Shortly after compassionate extubation with Drew and Peace at bedside, the patient passed away.      Pertinent Lab Results:     Results from last 7 days   Lab Units 01/03/22  0735   SODIUM mmol/L 139   POTASSIUM mmol/L 4.8   CHLORIDE mmol/L 97*   CO2 mmol/L 31.4*   BUN mg/dL 23   CREATININE mg/dL 0.75   CALCIUM mg/dL 10.0   BILIRUBIN mg/dL 0.5   ALK PHOS U/L 133*   ALT (SGPT) U/L 13   AST (SGOT) U/L 22   GLUCOSE mg/dL 120*     Results from last 7 days   Lab Units 01/03/22  0735   WBC 10*3/mm3 8.20   HEMOGLOBIN g/dL 17.0*   HEMATOCRIT % 54.4*   PLATELETS 10*3/mm3 187     Results from last 7 days   Lab Units 01/04/22  0027   INR  0.95     Results from last 7 days   Lab Units 01/04/22  0027 01/03/22  0735   TROPONIN T ng/mL <0.010 <0.010     Results from last 7 days   Lab Units 01/04/22  0027   PROBNP pg/mL 6,344.0*             Results from last 7 days   Lab Units 01/04/22  0011   PH, ARTERIAL pH units 7.365   PO2 ART mm Hg 89.3   PCO2,  ARTERIAL mm Hg 56.4*   HCO3 ART mmol/L 32.2*           Pertinent Radiology Results:    Imaging Results (All)     Procedure Component Value Units Date/Time    CT Chest Pulmonary Embolism [[id number]] Collected: 01/04/22 0424     Updated: 01/04/22 0432    Addenda:        ADDENDUM REPORT    ADDENDUM:  This report was discussed with ANNA VEGA, CT on Jan 04, 2022   04:30:00 EST.    Authenticated by Isa Ruby DO on 01/04/2022 04:31:09 AM  Signed: 01/04/22 0431 by Isa Ruby DO    Narrative:      FINAL REPORT    TECHNIQUE:  null    CLINICAL HISTORY:  . positive d dimer, acute hypoxic respiratory failure    COMPARISON:  null    FINDINGS:  CT ANGIOGRAPHY CHEST WITH IV CONTRAST. 3-D POSTPROCESSING    Comparison: CR - XR CHEST 1 VW - 01/03/2022 11:41 PM EST    Findings:    Motion artifact limits assessment.    Study is also limited as patient's upper extremities are included in the field-of-view and she is wearing multiple rings producing significant streak artifact.    There are filling defects in several left lower lobe subsegmental pulmonary arteries, axial image 162 series 6 and coronal images 265-to 67 series 7. No central or saddle embolus.    There is significant left-sided volume loss associated with severe elevation of the left hemidiaphragm. There is limited assessment for possible diaphragmatic hernia. There are multiple coalescent airspace opacities in the left upper and lower lobes.   There is right lower lobe atelectasis and/or scarring. Probable small left pleural effusion. No pneumothorax.    The RV/LV ratio = 1.0. The thoracic aorta is ectatic with no aneurysmal dilatation. No thyromegaly or mediastinal lymphadenopathy.    Upper abdomen: Cholecystectomy. Contrast reflux in the IVC and hepatic veins can be associated with right heart dysfunction.    Osseous structures: Significant streak artifact from a left shoulder arthroplasty. Mild thoracic dextroscoliosis and severe lumbar levoscoliosis.       Impression:      Impression:    1. Limited study as above.    2. Positive for left lower lobe pulmonary emboli, minimal clot burden. No CT evidence for right heart strain.    3. Severe left-sided volume loss with marked elevation of the left hemidiaphragm. There is limited assessment of the left hemidiaphragm.    4. Multifocal compressive atelectasis and/or consolidation in the left lung with probable small left pleural effusion.    Authenticated by Isa Ruby DO on 01/04/2022 04:24:32 AM    XR Chest 1 View [[id number]] Collected: 01/04/22 0050     Updated: 01/04/22 0052    Narrative:      FINAL REPORT    TECHNIQUE:  null    CLINICAL HISTORY:  hypoxic respiratory failure    COMPARISON:  null    FINDINGS:  Radiographs of the chest 1 view    Comparison: CR - XR CHEST 1  - 01/03/2022 11:29 PM EST    Findings:    Portable semiupright study.    Pleural-parenchymal opacity on the left involve the lower third of the hemithorax appearing smaller, allowing for technical positional differences. No pneumothorax or pneumomediastinum. Cardiomegaly without CHF. Stable bony thorax.      Impression:      Impression:    1. Small left pleural effusion with adjacent compressive atelectasis and/or consolidation.    Authenticated by Isa Ruby DO on 01/04/2022 12:50:59 AM    XR Chest 1 View [[id number]] Collected: 01/04/22 0048     Updated: 01/04/22 0049    Narrative:      FINAL REPORT    TECHNIQUE:  null    CLINICAL HISTORY:  acute hypoxic respiratory failure    COMPARISON:  null    FINDINGS:  Radiographs of the chest 1 view    Comparison: CR - XR CHEST 1  - 01/03/2022 07:57 AM EST    Findings:    Portable semiupright study.    There is now pleural-parenchymal opacity in the lower half of the left hemithorax. No pneumothorax or pneumomediastinum. Cardiomegaly without CHF. There is calcified mediastinal lymph node. There is a left shoulder prosthesis. There is thoracolumbar   scoliosis.      Impression:       Impression:    1. Small to moderate left pleural effusion with adjacent compressive atelectasis and/or consolidation.    Authenticated by Isa Ruby DO on 01/04/2022 12:48:15 AM    CT Head Without Contrast [[id number]] Collected: 01/03/22 0900     Updated: 01/03/22 0903    Narrative:      PROCEDURE: CT HEAD WO CONTRAST-     HISTORY: falls, anticoagulated. reported mental status changes.     TECHNIQUE: Noncontrast exam     FINDINGS:  Moderate atrophy and chronic ischemic white matter changes  are noted.     No cortical edema is present. There is no mass or hemorrhage. Ventricles  are normal.     Bone windows show no skull fracture or obvious destructive lesion.       Impression:      1. No acute intracranial abnormality or obvious mass.   2. Atrophy and chronic ischemic white matter changes as above.        This study was performed with techniques to keep radiation doses as low  as reasonably achievable (ALARA). Individualized dose reduction  techniques using automated exposure control or adjustment of vA and/or  kV according to the patient size were employed.      This report was finalized on 1/3/2022 9:01 AM by Lisandro Patrick MD.    XR Chest 1 View [[id number]] Collected: 01/03/22 0806     Updated: 01/03/22 0902    Narrative:      PROCEDURE: XR CHEST 1 VW-     HISTORY: AMS protocol , smoking history     COMPARISON:  9/10/2021     FINDINGS:  Portable view of the chest demonstrates chronic elevation  left diaphragm. Chronic scarring is noted right lung base. No acute  infiltrate is seen. There is no evidence of effusion, pneumothorax or  other significant pleural disease. The mediastinum is unremarkable.     The heart size is normal.       Impression:      Chronic changes      This report was finalized on 1/3/2022 9:00 AM by Lisandro Patrick MD.            Code status during the hospital stay:    Code Status and Medical Interventions:   Ordered at: 01/03/22 1409     Level Of Support Discussed With:    Patient     Code  Status (Patient has no pulse and is not breathing):    CPR (Attempt to Resuscitate)     Medical Interventions (Patient has pulse or is breathing):    Full Support           Test Results Pending at Discharge:    Pending Labs     Order Current Status    Blood Culture - Blood, Hand, Left In process    Blood Culture - Blood, Hand, Right In process             Gregorio Dennis MD  01/04/22  07:08 EST    Time: I spent >30 minutes on this discharge activity which included: face-to-face encounter with the patient and/or family, reviewing the data in the system, coordination of the care with the nursing staff as well as consultants, documentation, and entering orders.

## 2022-01-04 NOTE — PROGRESS NOTES
Cross cover note:    I was called because worsening hypoxic resp failure. When I arrived she was on NRB fio2 100% and spo2 was in the mid 70s. She had diminished breath sounds in left lung fields. Placed her on bipap with minimal improvement in spo2. CXR on my read showed collapsed left upper lobe. Dr. Jack began to prepare for intubation when spo2 began to improve to mid 90s; repeat cxr showed significant lung expansion on my read.     Acute hypoxic respiratory failure likely 2/2 mucus plugging, resolved/resolving  -continue bipap  -aggressive pulmonary toilet with duonebs q4h, chest physiotherapy, acetylcysteine  -start rocephin/azithromycin  -am cxr  -check d-dimer and bnp  -check abg

## 2022-01-04 NOTE — CODE DOCUMENTATION
0424-Etomidate 10mg IVP given with flush per Brianna RN  Rocuronium 60mg IVP given with flush per Brianna RN  Levophed drip 0.06 ethel/kg/min started by Nikkie GIL      0426-Levophed drip increased 0.3 ethel/kg/min    Code started 0410/ended 0414  Code restarted 0434/ended 0436  0438-Dr. Dennis states he talked with family by telephone and states family wishes patient to be DNR

## 2022-01-04 NOTE — NURSING NOTE
2343: #20 RFA  HR: 101  RR: 40  O2 sat 74% on bipap 100%  BP: 133/90 (99)    2344: #20 RAC    2348: Portable CXR  HR 92  O2 sat 95% on bipap 100%    2350: Percussion vest applied per RT

## 2022-01-05 NOTE — CASE MANAGEMENT/SOCIAL WORK
Case Management Discharge Note                Selected Continued Care - Discharged on 2022 Admission date: 1/3/2022 - Discharge disposition:     Destination    No services have been selected for the patient.              Durable Medical Equipment    No services have been selected for the patient.              Dialysis/Infusion    No services have been selected for the patient.              Home Medical Care    No services have been selected for the patient.              Therapy    No services have been selected for the patient.              Community Resources    No services have been selected for the patient.              [facility]    No services have been selected for the patient.                       Final Discharge Disposition Code: 20 -

## 2022-01-08 LAB
BACTERIA SPEC AEROBE CULT: NORMAL
BACTERIA SPEC AEROBE CULT: NORMAL

## 2024-04-02 NOTE — CASE MANAGEMENT/SOCIAL WORK
Case Management Discharge Note           Provided Post Acute Provider List?: Yes  Post Acute Provider List: Nursing Home  Provided Post Acute Provider Quality & Resource List?: Yes  Post Acute Provider Quality and Resource List: Nursing Home  Delivered To: Support Person  Support Person: ferny Whitten  Method of Delivery: In person    Selected Continued Care - Discharged on 9/15/2021 Admission date: 9/10/2021 - Discharge disposition: Home or Self Care            Transportation Services  Private: Car    Final Discharge Disposition Code: 01 - home or self-care     Pt refused Home Health.     Spontaneous, unlabored and symmetrical

## (undated) DEVICE — VIOLET BRAIDED (POLYGLACTIN 910), SYNTHETIC ABSORBABLE SUTURE: Brand: COATED VICRYL

## (undated) DEVICE — KT POSTN SCHLEIN

## (undated) DEVICE — GLV SURG TRIUMPH ORTHO W/ALOE PF LTX 8 STRL

## (undated) DEVICE — PK HIP GEN 20

## (undated) DEVICE — SUT VIC 2/0 CT1 27IN J259H

## (undated) DEVICE — OCCLUSIVE GAUZE STRIP OVERWRAP,3% BISMUTH TRIBROMOPHENATE IN PETROLATUM BLEND: Brand: XEROFORM

## (undated) DEVICE — BLD CLIP UNIV SURG GRY

## (undated) DEVICE — GLV SURG SENSICARE W/ALOE PF LF 8 STRL

## (undated) DEVICE — 4-PORT MANIFOLD: Brand: NEPTUNE 2

## (undated) DEVICE — DRSNG SURESITE123 6X8IN

## (undated) DEVICE — SUT ETHIB 5 V37 30IN MB66G

## (undated) DEVICE — VESSEL LOOPS X-RAY DETECTABLE: Brand: DEROYAL

## (undated) DEVICE — HANDPIECE SET WITH HIGH FLOW TIP AND SUCTION TUBE: Brand: INTERPULSE

## (undated) DEVICE — DRSNG PAD ABD 8X10IN STRL

## (undated) DEVICE — FLEXIBLE YANKAUER,MEDIUM TIP, NO VACUUM CONTROL: Brand: ARGYLE

## (undated) DEVICE — 1000 S-DRAPE TOWEL DRAPE 10/BX: Brand: STERI-DRAPE™

## (undated) DEVICE — AMD ANTIMICROBIAL GAUZE SPONGES,12 PLY USP TYPE VII, 0.2% POLYHEXAMETHYLENE BIGUANIDE HCI (PHMB): Brand: CURITY

## (undated) DEVICE — Device

## (undated) DEVICE — PIN STNMAN 3.2MM 9IN
Type: IMPLANTABLE DEVICE | Site: SHOULDER | Status: NON-FUNCTIONAL
Removed: 2019-02-07

## (undated) DEVICE — OCCLUSIVE GAUZE STRIP,3% BISMUTH TRIBROMOPHENATE IN PETROLATUM BLEND: Brand: XEROFORM

## (undated) DEVICE — 2108 SERIES SAGITTAL BLADE, NO OFFSET  (24.8 X 1.24 X 80.1MM)

## (undated) DEVICE — 3M™ TEGADERM™ CHG DRESSING 25/CARTON 4 CARTONS/CASE 1659: Brand: TEGADERM™

## (undated) DEVICE — CUFF SCD HEMOFORCE SEQ CALF STD MD